# Patient Record
Sex: FEMALE | Race: BLACK OR AFRICAN AMERICAN | NOT HISPANIC OR LATINO | Employment: OTHER | URBAN - METROPOLITAN AREA
[De-identification: names, ages, dates, MRNs, and addresses within clinical notes are randomized per-mention and may not be internally consistent; named-entity substitution may affect disease eponyms.]

---

## 2017-01-03 ENCOUNTER — AMBULATORY - HEALTHEAST (OUTPATIENT)
Dept: LAB | Facility: CLINIC | Age: 52
End: 2017-01-03

## 2017-01-03 ENCOUNTER — COMMUNICATION - HEALTHEAST (OUTPATIENT)
Dept: FAMILY MEDICINE | Facility: CLINIC | Age: 52
End: 2017-01-03

## 2017-01-03 ENCOUNTER — AMBULATORY - HEALTHEAST (OUTPATIENT)
Dept: NURSING | Facility: CLINIC | Age: 52
End: 2017-01-03

## 2017-01-03 DIAGNOSIS — I10 ESSENTIAL HYPERTENSION: ICD-10-CM

## 2017-01-03 DIAGNOSIS — F10.20 ALCOHOLISM (H): ICD-10-CM

## 2017-01-03 DIAGNOSIS — M17.9 OA (OSTEOARTHRITIS) OF KNEE: ICD-10-CM

## 2017-01-04 ENCOUNTER — COMMUNICATION - HEALTHEAST (OUTPATIENT)
Dept: FAMILY MEDICINE | Facility: CLINIC | Age: 52
End: 2017-01-04

## 2017-01-04 DIAGNOSIS — J45.30 MILD PERSISTENT ASTHMA WITHOUT COMPLICATION: ICD-10-CM

## 2017-01-13 ENCOUNTER — COMMUNICATION - HEALTHEAST (OUTPATIENT)
Dept: FAMILY MEDICINE | Facility: CLINIC | Age: 52
End: 2017-01-13

## 2017-01-13 DIAGNOSIS — M25.819: ICD-10-CM

## 2017-01-23 ENCOUNTER — RECORDS - HEALTHEAST (OUTPATIENT)
Dept: ADMINISTRATIVE | Facility: OTHER | Age: 52
End: 2017-01-23

## 2017-01-26 ENCOUNTER — COMMUNICATION - HEALTHEAST (OUTPATIENT)
Dept: FAMILY MEDICINE | Facility: CLINIC | Age: 52
End: 2017-01-26

## 2017-01-26 DIAGNOSIS — M25.819: ICD-10-CM

## 2017-01-26 DIAGNOSIS — M17.9 OA (OSTEOARTHRITIS) OF KNEE: ICD-10-CM

## 2017-02-10 ENCOUNTER — RECORDS - HEALTHEAST (OUTPATIENT)
Dept: ADMINISTRATIVE | Facility: OTHER | Age: 52
End: 2017-02-10

## 2017-02-27 ENCOUNTER — COMMUNICATION - HEALTHEAST (OUTPATIENT)
Dept: FAMILY MEDICINE | Facility: CLINIC | Age: 52
End: 2017-02-27

## 2017-02-27 DIAGNOSIS — M17.9 OA (OSTEOARTHRITIS) OF KNEE: ICD-10-CM

## 2017-03-14 ENCOUNTER — RECORDS - HEALTHEAST (OUTPATIENT)
Dept: ADMINISTRATIVE | Facility: OTHER | Age: 52
End: 2017-03-14

## 2017-03-29 ENCOUNTER — COMMUNICATION - HEALTHEAST (OUTPATIENT)
Dept: FAMILY MEDICINE | Facility: CLINIC | Age: 52
End: 2017-03-29

## 2017-03-29 DIAGNOSIS — M17.9 OA (OSTEOARTHRITIS) OF KNEE: ICD-10-CM

## 2017-04-27 ENCOUNTER — COMMUNICATION - HEALTHEAST (OUTPATIENT)
Dept: FAMILY MEDICINE | Facility: CLINIC | Age: 52
End: 2017-04-27

## 2017-04-27 DIAGNOSIS — F41.1 GAD (GENERALIZED ANXIETY DISORDER): ICD-10-CM

## 2017-04-27 DIAGNOSIS — I10 UNSPECIFIED ESSENTIAL HYPERTENSION: ICD-10-CM

## 2017-04-27 DIAGNOSIS — J30.9 ALLERGIC RHINITIS: ICD-10-CM

## 2017-04-27 DIAGNOSIS — F32.9 MAJOR DEPRESSION: ICD-10-CM

## 2017-04-27 DIAGNOSIS — M17.9 OA (OSTEOARTHRITIS) OF KNEE: ICD-10-CM

## 2017-05-02 ENCOUNTER — COMMUNICATION - HEALTHEAST (OUTPATIENT)
Dept: FAMILY MEDICINE | Facility: CLINIC | Age: 52
End: 2017-05-02

## 2017-05-11 ENCOUNTER — RECORDS - HEALTHEAST (OUTPATIENT)
Dept: ADMINISTRATIVE | Facility: OTHER | Age: 52
End: 2017-05-11

## 2017-06-05 ENCOUNTER — COMMUNICATION - HEALTHEAST (OUTPATIENT)
Dept: FAMILY MEDICINE | Facility: CLINIC | Age: 52
End: 2017-06-05

## 2017-06-05 DIAGNOSIS — M25.819: ICD-10-CM

## 2017-06-08 ENCOUNTER — COMMUNICATION - HEALTHEAST (OUTPATIENT)
Dept: FAMILY MEDICINE | Facility: CLINIC | Age: 52
End: 2017-06-08

## 2017-06-08 DIAGNOSIS — M17.9 OA (OSTEOARTHRITIS) OF KNEE: ICD-10-CM

## 2017-06-18 ENCOUNTER — COMMUNICATION - HEALTHEAST (OUTPATIENT)
Dept: FAMILY MEDICINE | Facility: CLINIC | Age: 52
End: 2017-06-18

## 2017-06-18 DIAGNOSIS — J45.30 MILD PERSISTENT ASTHMA WITHOUT COMPLICATION: ICD-10-CM

## 2017-07-03 ENCOUNTER — COMMUNICATION - HEALTHEAST (OUTPATIENT)
Dept: FAMILY MEDICINE | Facility: CLINIC | Age: 52
End: 2017-07-03

## 2017-07-06 ENCOUNTER — OFFICE VISIT - HEALTHEAST (OUTPATIENT)
Dept: FAMILY MEDICINE | Facility: CLINIC | Age: 52
End: 2017-07-06

## 2017-07-06 DIAGNOSIS — M25.812 SHOULDER IMPINGEMENT, LEFT: ICD-10-CM

## 2017-07-06 DIAGNOSIS — Z01.818 PREOP EXAMINATION: ICD-10-CM

## 2017-07-06 DIAGNOSIS — M75.22 BICEPS TENDONITIS ON LEFT: ICD-10-CM

## 2017-07-06 DIAGNOSIS — J30.9 ALLERGIC RHINITIS: ICD-10-CM

## 2017-07-06 DIAGNOSIS — M25.819: ICD-10-CM

## 2017-07-06 LAB
ATRIAL RATE - MUSE: 85 BPM
DIASTOLIC BLOOD PRESSURE - MUSE: NORMAL MMHG
INTERPRETATION ECG - MUSE: NORMAL
P AXIS - MUSE: 39 DEGREES
PR INTERVAL - MUSE: 164 MS
QRS DURATION - MUSE: 82 MS
QT - MUSE: 400 MS
QTC - MUSE: 476 MS
R AXIS - MUSE: -17 DEGREES
SYSTOLIC BLOOD PRESSURE - MUSE: NORMAL MMHG
T AXIS - MUSE: 59 DEGREES
VENTRICULAR RATE- MUSE: 85 BPM

## 2017-07-06 ASSESSMENT — MIFFLIN-ST. JEOR: SCORE: 1898.47

## 2017-07-07 ENCOUNTER — COMMUNICATION - HEALTHEAST (OUTPATIENT)
Dept: FAMILY MEDICINE | Facility: CLINIC | Age: 52
End: 2017-07-07

## 2017-07-11 ENCOUNTER — OFFICE VISIT - HEALTHEAST (OUTPATIENT)
Dept: FAMILY MEDICINE | Facility: CLINIC | Age: 52
End: 2017-07-11

## 2017-07-11 DIAGNOSIS — R73.9 HYPERGLYCEMIA: ICD-10-CM

## 2017-07-11 DIAGNOSIS — E11.9 DIABETES TYPE 2, CONTROLLED (H): ICD-10-CM

## 2017-07-11 LAB — HBA1C MFR BLD: 6.5 % (ref 3.5–6)

## 2017-07-12 ENCOUNTER — RECORDS - HEALTHEAST (OUTPATIENT)
Dept: ADMINISTRATIVE | Facility: OTHER | Age: 52
End: 2017-07-12

## 2017-07-25 ENCOUNTER — COMMUNICATION - HEALTHEAST (OUTPATIENT)
Dept: FAMILY MEDICINE | Facility: CLINIC | Age: 52
End: 2017-07-25

## 2017-07-25 DIAGNOSIS — M25.819: ICD-10-CM

## 2017-07-25 DIAGNOSIS — M17.9 OA (OSTEOARTHRITIS) OF KNEE: ICD-10-CM

## 2017-07-28 ENCOUNTER — COMMUNICATION - HEALTHEAST (OUTPATIENT)
Dept: FAMILY MEDICINE | Facility: CLINIC | Age: 52
End: 2017-07-28

## 2017-07-28 DIAGNOSIS — M25.819: ICD-10-CM

## 2017-08-20 ENCOUNTER — COMMUNICATION - HEALTHEAST (OUTPATIENT)
Dept: FAMILY MEDICINE | Facility: CLINIC | Age: 52
End: 2017-08-20

## 2017-08-20 DIAGNOSIS — M25.819: ICD-10-CM

## 2017-08-22 ENCOUNTER — COMMUNICATION - HEALTHEAST (OUTPATIENT)
Dept: FAMILY MEDICINE | Facility: CLINIC | Age: 52
End: 2017-08-22

## 2017-08-23 ENCOUNTER — COMMUNICATION - HEALTHEAST (OUTPATIENT)
Dept: TELEHEALTH | Facility: CLINIC | Age: 52
End: 2017-08-23

## 2017-08-23 ENCOUNTER — OFFICE VISIT - HEALTHEAST (OUTPATIENT)
Dept: FAMILY MEDICINE | Facility: CLINIC | Age: 52
End: 2017-08-23

## 2017-08-23 ENCOUNTER — RECORDS - HEALTHEAST (OUTPATIENT)
Dept: ADMINISTRATIVE | Facility: OTHER | Age: 52
End: 2017-08-23

## 2017-08-23 DIAGNOSIS — Z01.818 PREOP EXAMINATION: ICD-10-CM

## 2017-08-23 ASSESSMENT — MIFFLIN-ST. JEOR: SCORE: 1880.33

## 2017-08-24 ENCOUNTER — ANESTHESIA - HEALTHEAST (OUTPATIENT)
Dept: SURGERY | Facility: CLINIC | Age: 52
End: 2017-08-24

## 2017-08-30 ENCOUNTER — COMMUNICATION - HEALTHEAST (OUTPATIENT)
Dept: FAMILY MEDICINE | Facility: CLINIC | Age: 52
End: 2017-08-30

## 2017-08-30 DIAGNOSIS — M17.9 OA (OSTEOARTHRITIS) OF KNEE: ICD-10-CM

## 2017-08-30 DIAGNOSIS — M94.212 GLENOID CHONDROMALACIA OF LEFT SHOULDER: ICD-10-CM

## 2017-08-30 DIAGNOSIS — M75.42 SHOULDER IMPINGEMENT SYNDROME, LEFT: ICD-10-CM

## 2017-08-30 DIAGNOSIS — M75.22 BICEPS TENDINITIS OF LEFT UPPER EXTREMITY: ICD-10-CM

## 2017-09-06 ENCOUNTER — COMMUNICATION - HEALTHEAST (OUTPATIENT)
Dept: FAMILY MEDICINE | Facility: CLINIC | Age: 52
End: 2017-09-06

## 2017-09-06 DIAGNOSIS — M94.212 GLENOID CHONDROMALACIA OF LEFT SHOULDER: ICD-10-CM

## 2017-09-06 DIAGNOSIS — M25.819: ICD-10-CM

## 2017-09-06 DIAGNOSIS — M75.22 BICEPS TENDINITIS OF LEFT UPPER EXTREMITY: ICD-10-CM

## 2017-09-06 DIAGNOSIS — M75.42 SHOULDER IMPINGEMENT SYNDROME, LEFT: ICD-10-CM

## 2017-09-12 ENCOUNTER — RECORDS - HEALTHEAST (OUTPATIENT)
Dept: ADMINISTRATIVE | Facility: OTHER | Age: 52
End: 2017-09-12

## 2017-10-11 ENCOUNTER — COMMUNICATION - HEALTHEAST (OUTPATIENT)
Dept: FAMILY MEDICINE | Facility: CLINIC | Age: 52
End: 2017-10-11

## 2017-10-11 DIAGNOSIS — J30.9 ALLERGIC RHINITIS: ICD-10-CM

## 2017-10-11 DIAGNOSIS — I10 ESSENTIAL HYPERTENSION: ICD-10-CM

## 2017-10-11 DIAGNOSIS — F41.1 GAD (GENERALIZED ANXIETY DISORDER): ICD-10-CM

## 2017-10-31 ENCOUNTER — COMMUNICATION - HEALTHEAST (OUTPATIENT)
Dept: FAMILY MEDICINE | Facility: CLINIC | Age: 52
End: 2017-10-31

## 2017-10-31 DIAGNOSIS — M17.9 OA (OSTEOARTHRITIS) OF KNEE: ICD-10-CM

## 2017-11-02 ENCOUNTER — RECORDS - HEALTHEAST (OUTPATIENT)
Dept: ADMINISTRATIVE | Facility: OTHER | Age: 52
End: 2017-11-02

## 2017-11-20 ENCOUNTER — COMMUNICATION - HEALTHEAST (OUTPATIENT)
Dept: FAMILY MEDICINE | Facility: CLINIC | Age: 52
End: 2017-11-20

## 2017-11-30 ENCOUNTER — COMMUNICATION - HEALTHEAST (OUTPATIENT)
Dept: FAMILY MEDICINE | Facility: CLINIC | Age: 52
End: 2017-11-30

## 2017-11-30 DIAGNOSIS — M94.212 GLENOID CHONDROMALACIA OF LEFT SHOULDER: ICD-10-CM

## 2017-11-30 DIAGNOSIS — M75.42 SHOULDER IMPINGEMENT SYNDROME, LEFT: ICD-10-CM

## 2017-11-30 DIAGNOSIS — M75.22 BICEPS TENDINITIS OF LEFT UPPER EXTREMITY: ICD-10-CM

## 2017-11-30 DIAGNOSIS — M17.9 OA (OSTEOARTHRITIS) OF KNEE: ICD-10-CM

## 2018-01-05 ENCOUNTER — COMMUNICATION - HEALTHEAST (OUTPATIENT)
Dept: FAMILY MEDICINE | Facility: CLINIC | Age: 53
End: 2018-01-05

## 2018-01-05 DIAGNOSIS — F32.9 MAJOR DEPRESSION: ICD-10-CM

## 2018-01-05 DIAGNOSIS — M17.9 OA (OSTEOARTHRITIS) OF KNEE: ICD-10-CM

## 2018-03-18 ENCOUNTER — COMMUNICATION - HEALTHEAST (OUTPATIENT)
Dept: FAMILY MEDICINE | Facility: CLINIC | Age: 53
End: 2018-03-18

## 2018-03-18 DIAGNOSIS — M17.9 OA (OSTEOARTHRITIS) OF KNEE: ICD-10-CM

## 2018-03-20 ENCOUNTER — RECORDS - HEALTHEAST (OUTPATIENT)
Dept: ADMINISTRATIVE | Facility: OTHER | Age: 53
End: 2018-03-20

## 2018-04-18 ENCOUNTER — COMMUNICATION - HEALTHEAST (OUTPATIENT)
Dept: FAMILY MEDICINE | Facility: CLINIC | Age: 53
End: 2018-04-18

## 2018-04-18 DIAGNOSIS — M17.9 OA (OSTEOARTHRITIS) OF KNEE: ICD-10-CM

## 2018-05-15 ENCOUNTER — COMMUNICATION - HEALTHEAST (OUTPATIENT)
Dept: FAMILY MEDICINE | Facility: CLINIC | Age: 53
End: 2018-05-15

## 2018-05-15 DIAGNOSIS — M17.9 OA (OSTEOARTHRITIS) OF KNEE: ICD-10-CM

## 2018-06-07 ENCOUNTER — COMMUNICATION - HEALTHEAST (OUTPATIENT)
Dept: FAMILY MEDICINE | Facility: CLINIC | Age: 53
End: 2018-06-07

## 2018-06-21 ENCOUNTER — COMMUNICATION - HEALTHEAST (OUTPATIENT)
Dept: FAMILY MEDICINE | Facility: CLINIC | Age: 53
End: 2018-06-21

## 2018-06-21 DIAGNOSIS — M17.9 OA (OSTEOARTHRITIS) OF KNEE: ICD-10-CM

## 2018-06-21 DIAGNOSIS — J45.30 MILD PERSISTENT ASTHMA WITHOUT COMPLICATION: ICD-10-CM

## 2018-06-22 ENCOUNTER — COMMUNICATION - HEALTHEAST (OUTPATIENT)
Dept: FAMILY MEDICINE | Facility: CLINIC | Age: 53
End: 2018-06-22

## 2018-06-22 DIAGNOSIS — F32.9 MAJOR DEPRESSION: ICD-10-CM

## 2018-07-19 ENCOUNTER — COMMUNICATION - HEALTHEAST (OUTPATIENT)
Dept: TELEHEALTH | Facility: CLINIC | Age: 53
End: 2018-07-19

## 2018-07-19 ENCOUNTER — OFFICE VISIT - HEALTHEAST (OUTPATIENT)
Dept: FAMILY MEDICINE | Facility: CLINIC | Age: 53
End: 2018-07-19

## 2018-07-19 DIAGNOSIS — F32.1 MODERATE SINGLE CURRENT EPISODE OF MAJOR DEPRESSIVE DISORDER (H): ICD-10-CM

## 2018-07-19 DIAGNOSIS — I10 ESSENTIAL HYPERTENSION: ICD-10-CM

## 2018-07-19 DIAGNOSIS — F33.2 SEVERE RECURRENT MAJOR DEPRESSION (H): ICD-10-CM

## 2018-07-19 DIAGNOSIS — Z12.31 VISIT FOR SCREENING MAMMOGRAM: ICD-10-CM

## 2018-07-19 DIAGNOSIS — J30.9 ALLERGIC RHINITIS: ICD-10-CM

## 2018-07-19 DIAGNOSIS — F41.1 GAD (GENERALIZED ANXIETY DISORDER): ICD-10-CM

## 2018-07-19 DIAGNOSIS — E78.00 PURE HYPERCHOLESTEROLEMIA: ICD-10-CM

## 2018-07-19 DIAGNOSIS — J45.30 MILD PERSISTENT ASTHMA WITHOUT COMPLICATION: ICD-10-CM

## 2018-07-19 DIAGNOSIS — Z12.31 ENCOUNTER FOR SCREENING MAMMOGRAM FOR MALIGNANT NEOPLASM OF BREAST: ICD-10-CM

## 2018-07-19 DIAGNOSIS — Z12.11 SCREEN FOR COLON CANCER: ICD-10-CM

## 2018-07-19 DIAGNOSIS — E11.9 TYPE 2 DIABETES MELLITUS WITHOUT COMPLICATION, WITHOUT LONG-TERM CURRENT USE OF INSULIN (H): ICD-10-CM

## 2018-07-19 LAB
ANION GAP SERPL CALCULATED.3IONS-SCNC: 10 MMOL/L (ref 5–18)
BUN SERPL-MCNC: 11 MG/DL (ref 8–22)
CALCIUM SERPL-MCNC: 9.5 MG/DL (ref 8.5–10.5)
CHLORIDE BLD-SCNC: 101 MMOL/L (ref 98–107)
CHOLEST SERPL-MCNC: 216 MG/DL
CO2 SERPL-SCNC: 29 MMOL/L (ref 22–31)
CREAT SERPL-MCNC: 0.79 MG/DL (ref 0.6–1.1)
FASTING STATUS PATIENT QL REPORTED: ABNORMAL
GFR SERPL CREATININE-BSD FRML MDRD: >60 ML/MIN/1.73M2
GLUCOSE BLD-MCNC: 97 MG/DL (ref 70–125)
HBA1C MFR BLD: 5.9 % (ref 3.5–6)
HDLC SERPL-MCNC: 45 MG/DL
LDLC SERPL CALC-MCNC: 145 MG/DL
POTASSIUM BLD-SCNC: 4 MMOL/L (ref 3.5–5)
SODIUM SERPL-SCNC: 140 MMOL/L (ref 136–145)
TRIGL SERPL-MCNC: 130 MG/DL

## 2018-07-19 ASSESSMENT — MIFFLIN-ST. JEOR: SCORE: 1780.54

## 2018-07-24 ENCOUNTER — COMMUNICATION - HEALTHEAST (OUTPATIENT)
Dept: FAMILY MEDICINE | Facility: CLINIC | Age: 53
End: 2018-07-24

## 2018-07-24 DIAGNOSIS — J45.30 MILD PERSISTENT ASTHMA WITHOUT COMPLICATION: ICD-10-CM

## 2018-07-25 ENCOUNTER — RECORDS - HEALTHEAST (OUTPATIENT)
Dept: ADMINISTRATIVE | Facility: OTHER | Age: 53
End: 2018-07-25

## 2018-07-26 ENCOUNTER — AMBULATORY - HEALTHEAST (OUTPATIENT)
Dept: FAMILY MEDICINE | Facility: CLINIC | Age: 53
End: 2018-07-26

## 2018-08-13 ENCOUNTER — COMMUNICATION - HEALTHEAST (OUTPATIENT)
Dept: FAMILY MEDICINE | Facility: CLINIC | Age: 53
End: 2018-08-13

## 2018-08-13 DIAGNOSIS — M17.9 OA (OSTEOARTHRITIS) OF KNEE: ICD-10-CM

## 2018-08-17 ENCOUNTER — COMMUNICATION - HEALTHEAST (OUTPATIENT)
Dept: FAMILY MEDICINE | Facility: CLINIC | Age: 53
End: 2018-08-17

## 2018-08-23 ENCOUNTER — COMMUNICATION - HEALTHEAST (OUTPATIENT)
Dept: FAMILY MEDICINE | Facility: CLINIC | Age: 53
End: 2018-08-23

## 2018-08-24 ENCOUNTER — COMMUNICATION - HEALTHEAST (OUTPATIENT)
Dept: FAMILY MEDICINE | Facility: CLINIC | Age: 53
End: 2018-08-24

## 2018-08-24 DIAGNOSIS — M17.9 OA (OSTEOARTHRITIS) OF KNEE: ICD-10-CM

## 2018-09-06 ENCOUNTER — COMMUNICATION - HEALTHEAST (OUTPATIENT)
Dept: FAMILY MEDICINE | Facility: CLINIC | Age: 53
End: 2018-09-06

## 2018-09-06 DIAGNOSIS — J30.9 ALLERGIC RHINITIS: ICD-10-CM

## 2018-09-06 DIAGNOSIS — F41.1 GAD (GENERALIZED ANXIETY DISORDER): ICD-10-CM

## 2018-09-18 ENCOUNTER — RECORDS - HEALTHEAST (OUTPATIENT)
Dept: ADMINISTRATIVE | Facility: OTHER | Age: 53
End: 2018-09-18

## 2018-09-19 ENCOUNTER — COMMUNICATION - HEALTHEAST (OUTPATIENT)
Dept: FAMILY MEDICINE | Facility: CLINIC | Age: 53
End: 2018-09-19

## 2018-09-19 DIAGNOSIS — M17.9 OA (OSTEOARTHRITIS) OF KNEE: ICD-10-CM

## 2018-09-20 ENCOUNTER — AMBULATORY - HEALTHEAST (OUTPATIENT)
Dept: BEHAVIORAL HEALTH | Facility: CLINIC | Age: 53
End: 2018-09-20

## 2018-10-11 ENCOUNTER — RECORDS - HEALTHEAST (OUTPATIENT)
Dept: ADMINISTRATIVE | Facility: OTHER | Age: 53
End: 2018-10-11

## 2018-10-26 ENCOUNTER — COMMUNICATION - HEALTHEAST (OUTPATIENT)
Dept: FAMILY MEDICINE | Facility: CLINIC | Age: 53
End: 2018-10-26

## 2018-10-26 DIAGNOSIS — M17.9 OA (OSTEOARTHRITIS) OF KNEE: ICD-10-CM

## 2018-11-01 ENCOUNTER — COMMUNICATION - HEALTHEAST (OUTPATIENT)
Dept: FAMILY MEDICINE | Facility: CLINIC | Age: 53
End: 2018-11-01

## 2018-11-01 DIAGNOSIS — M17.9 OA (OSTEOARTHRITIS) OF KNEE: ICD-10-CM

## 2018-11-27 ENCOUNTER — COMMUNICATION - HEALTHEAST (OUTPATIENT)
Dept: FAMILY MEDICINE | Facility: CLINIC | Age: 53
End: 2018-11-27

## 2018-11-27 DIAGNOSIS — M17.9 OA (OSTEOARTHRITIS) OF KNEE: ICD-10-CM

## 2019-04-16 ENCOUNTER — COMMUNICATION - HEALTHEAST (OUTPATIENT)
Dept: TELEHEALTH | Facility: CLINIC | Age: 54
End: 2019-04-16

## 2019-04-16 ENCOUNTER — RECORDS - HEALTHEAST (OUTPATIENT)
Dept: MAMMOGRAPHY | Facility: CLINIC | Age: 54
End: 2019-04-16

## 2019-04-16 DIAGNOSIS — Z12.31 ENCOUNTER FOR SCREENING MAMMOGRAM FOR MALIGNANT NEOPLASM OF BREAST: ICD-10-CM

## 2019-05-28 ENCOUNTER — OFFICE VISIT - HEALTHEAST (OUTPATIENT)
Dept: FAMILY MEDICINE | Facility: CLINIC | Age: 54
End: 2019-05-28

## 2019-05-28 ENCOUNTER — COMMUNICATION - HEALTHEAST (OUTPATIENT)
Dept: TELEHEALTH | Facility: CLINIC | Age: 54
End: 2019-05-28

## 2019-05-28 DIAGNOSIS — J30.9 ALLERGIC RHINITIS: ICD-10-CM

## 2019-05-28 DIAGNOSIS — F11.90 CHRONIC, CONTINUOUS USE OF OPIOIDS: ICD-10-CM

## 2019-05-28 DIAGNOSIS — Z12.11 SCREEN FOR COLON CANCER: ICD-10-CM

## 2019-05-28 DIAGNOSIS — E11.9 TYPE 2 DIABETES MELLITUS WITHOUT COMPLICATION, WITHOUT LONG-TERM CURRENT USE OF INSULIN (H): ICD-10-CM

## 2019-05-28 DIAGNOSIS — F32.1 MODERATE SINGLE CURRENT EPISODE OF MAJOR DEPRESSIVE DISORDER (H): ICD-10-CM

## 2019-05-28 DIAGNOSIS — I10 ESSENTIAL HYPERTENSION: ICD-10-CM

## 2019-05-28 DIAGNOSIS — Z79.891 LONG TERM CURRENT USE OF OPIATE ANALGESIC: ICD-10-CM

## 2019-05-28 DIAGNOSIS — L30.9 ECZEMA, UNSPECIFIED TYPE: ICD-10-CM

## 2019-05-28 DIAGNOSIS — M17.9 OA (OSTEOARTHRITIS) OF KNEE: ICD-10-CM

## 2019-05-28 DIAGNOSIS — E66.01 MORBID OBESITY (H): ICD-10-CM

## 2019-05-28 DIAGNOSIS — F41.1 GAD (GENERALIZED ANXIETY DISORDER): ICD-10-CM

## 2019-05-28 DIAGNOSIS — J45.30 MILD PERSISTENT ASTHMA WITHOUT COMPLICATION: ICD-10-CM

## 2019-05-28 LAB
ANION GAP SERPL CALCULATED.3IONS-SCNC: 13 MMOL/L (ref 5–18)
BUN SERPL-MCNC: 13 MG/DL (ref 8–22)
CALCIUM SERPL-MCNC: 9.8 MG/DL (ref 8.5–10.5)
CHLORIDE BLD-SCNC: 101 MMOL/L (ref 98–107)
CO2 SERPL-SCNC: 26 MMOL/L (ref 22–31)
CREAT SERPL-MCNC: 0.77 MG/DL (ref 0.6–1.1)
CREAT UR-MCNC: 63.5 MG/DL
GFR SERPL CREATININE-BSD FRML MDRD: >60 ML/MIN/1.73M2
GLUCOSE BLD-MCNC: 106 MG/DL (ref 70–125)
HBA1C MFR BLD: 6 % (ref 3.5–6)
MICROALBUMIN UR-MCNC: <0.5 MG/DL (ref 0–1.99)
MICROALBUMIN/CREAT UR: NORMAL MG/G
POTASSIUM BLD-SCNC: 4 MMOL/L (ref 3.5–5)
SODIUM SERPL-SCNC: 140 MMOL/L (ref 136–145)

## 2019-06-02 LAB
AMPHETAMINES UR QL SCN: NORMAL
BARBITURATES UR QL: NORMAL
BENZODIAZ UR QL: NORMAL
CANNABINOIDS UR QL SCN: NORMAL
COCAINE UR QL: NORMAL
CREAT UR-MCNC: 66.2 MG/DL
METHADONE UR QL SCN: NORMAL
OPIATES UR QL SCN: NORMAL
OXYCODONE UR QL: NORMAL
PCP UR QL SCN: NORMAL

## 2019-06-11 ENCOUNTER — COMMUNICATION - HEALTHEAST (OUTPATIENT)
Dept: FAMILY MEDICINE | Facility: CLINIC | Age: 54
End: 2019-06-11

## 2019-06-12 ENCOUNTER — COMMUNICATION - HEALTHEAST (OUTPATIENT)
Dept: FAMILY MEDICINE | Facility: CLINIC | Age: 54
End: 2019-06-12

## 2019-06-12 DIAGNOSIS — M17.0 PRIMARY OSTEOARTHRITIS OF BOTH KNEES: ICD-10-CM

## 2019-06-12 DIAGNOSIS — E66.01 MORBID OBESITY (H): ICD-10-CM

## 2019-07-15 ENCOUNTER — COMMUNICATION - HEALTHEAST (OUTPATIENT)
Dept: FAMILY MEDICINE | Facility: CLINIC | Age: 54
End: 2019-07-15

## 2019-07-16 ENCOUNTER — COMMUNICATION - HEALTHEAST (OUTPATIENT)
Dept: FAMILY MEDICINE | Facility: CLINIC | Age: 54
End: 2019-07-16

## 2019-07-16 DIAGNOSIS — M17.9 OA (OSTEOARTHRITIS) OF KNEE: ICD-10-CM

## 2019-07-18 ENCOUNTER — COMMUNICATION - HEALTHEAST (OUTPATIENT)
Dept: SCHEDULING | Facility: CLINIC | Age: 54
End: 2019-07-18

## 2019-07-18 DIAGNOSIS — F32.1 MODERATE SINGLE CURRENT EPISODE OF MAJOR DEPRESSIVE DISORDER (H): ICD-10-CM

## 2019-07-19 ENCOUNTER — COMMUNICATION - HEALTHEAST (OUTPATIENT)
Dept: FAMILY MEDICINE | Facility: CLINIC | Age: 54
End: 2019-07-19

## 2019-07-19 DIAGNOSIS — M17.9 OA (OSTEOARTHRITIS) OF KNEE: ICD-10-CM

## 2019-07-22 ENCOUNTER — AMBULATORY - HEALTHEAST (OUTPATIENT)
Dept: FAMILY MEDICINE | Facility: CLINIC | Age: 54
End: 2019-07-22

## 2019-07-22 DIAGNOSIS — M17.9 OA (OSTEOARTHRITIS) OF KNEE: ICD-10-CM

## 2019-08-23 ENCOUNTER — COMMUNICATION - HEALTHEAST (OUTPATIENT)
Dept: FAMILY MEDICINE | Facility: CLINIC | Age: 54
End: 2019-08-23

## 2019-08-23 DIAGNOSIS — M17.9 OA (OSTEOARTHRITIS) OF KNEE: ICD-10-CM

## 2019-08-23 DIAGNOSIS — I10 ESSENTIAL HYPERTENSION: ICD-10-CM

## 2019-09-24 ENCOUNTER — COMMUNICATION - HEALTHEAST (OUTPATIENT)
Dept: FAMILY MEDICINE | Facility: CLINIC | Age: 54
End: 2019-09-24

## 2019-09-24 DIAGNOSIS — M17.9 OA (OSTEOARTHRITIS) OF KNEE: ICD-10-CM

## 2019-10-29 ENCOUNTER — COMMUNICATION - HEALTHEAST (OUTPATIENT)
Dept: FAMILY MEDICINE | Facility: CLINIC | Age: 54
End: 2019-10-29

## 2019-10-29 DIAGNOSIS — M17.9 OA (OSTEOARTHRITIS) OF KNEE: ICD-10-CM

## 2019-11-12 ENCOUNTER — OFFICE VISIT - HEALTHEAST (OUTPATIENT)
Dept: FAMILY MEDICINE | Facility: CLINIC | Age: 54
End: 2019-11-12

## 2019-11-12 DIAGNOSIS — Z23 NEED FOR INFLUENZA VACCINATION: ICD-10-CM

## 2019-11-12 DIAGNOSIS — Z11.59 NEED FOR HEPATITIS C SCREENING TEST: ICD-10-CM

## 2019-11-12 DIAGNOSIS — M19.011 OSTEOARTHRITIS OF RIGHT SHOULDER, UNSPECIFIED OSTEOARTHRITIS TYPE: ICD-10-CM

## 2019-11-12 DIAGNOSIS — E11.9 TYPE 2 DIABETES MELLITUS WITHOUT COMPLICATION, WITHOUT LONG-TERM CURRENT USE OF INSULIN (H): ICD-10-CM

## 2019-11-12 LAB
ALBUMIN SERPL-MCNC: 3.6 G/DL (ref 3.5–5)
ALP SERPL-CCNC: 110 U/L (ref 45–120)
ALT SERPL W P-5'-P-CCNC: 17 U/L (ref 0–45)
ANION GAP SERPL CALCULATED.3IONS-SCNC: 12 MMOL/L (ref 5–18)
AST SERPL W P-5'-P-CCNC: 23 U/L (ref 0–40)
BILIRUB SERPL-MCNC: 0.5 MG/DL (ref 0–1)
BUN SERPL-MCNC: 15 MG/DL (ref 8–22)
CALCIUM SERPL-MCNC: 9.4 MG/DL (ref 8.5–10.5)
CHLORIDE BLD-SCNC: 100 MMOL/L (ref 98–107)
CO2 SERPL-SCNC: 28 MMOL/L (ref 22–31)
CREAT SERPL-MCNC: 0.78 MG/DL (ref 0.6–1.1)
GFR SERPL CREATININE-BSD FRML MDRD: >60 ML/MIN/1.73M2
GLUCOSE BLD-MCNC: 124 MG/DL (ref 70–125)
HBA1C MFR BLD: 6.7 % (ref 3.5–6)
LDLC SERPL CALC-MCNC: 72 MG/DL
POTASSIUM BLD-SCNC: 4.2 MMOL/L (ref 3.5–5)
PROT SERPL-MCNC: 6.9 G/DL (ref 6–8)
SODIUM SERPL-SCNC: 140 MMOL/L (ref 136–145)

## 2019-11-12 ASSESSMENT — ANXIETY QUESTIONNAIRES
2. NOT BEING ABLE TO STOP OR CONTROL WORRYING: NOT AT ALL
1. FEELING NERVOUS, ANXIOUS, OR ON EDGE: NEARLY EVERY DAY

## 2019-11-12 ASSESSMENT — PATIENT HEALTH QUESTIONNAIRE - PHQ9: SUM OF ALL RESPONSES TO PHQ QUESTIONS 1-9: 22

## 2019-11-12 ASSESSMENT — MIFFLIN-ST. JEOR: SCORE: 1821.36

## 2019-11-13 LAB — HCV AB SERPL QL IA: NEGATIVE

## 2019-11-14 ENCOUNTER — COMMUNICATION - HEALTHEAST (OUTPATIENT)
Dept: FAMILY MEDICINE | Facility: CLINIC | Age: 54
End: 2019-11-14

## 2019-11-15 ENCOUNTER — COMMUNICATION - HEALTHEAST (OUTPATIENT)
Dept: FAMILY MEDICINE | Facility: CLINIC | Age: 54
End: 2019-11-15

## 2019-11-25 ENCOUNTER — COMMUNICATION - HEALTHEAST (OUTPATIENT)
Dept: FAMILY MEDICINE | Facility: CLINIC | Age: 54
End: 2019-11-25

## 2019-11-25 DIAGNOSIS — M17.9 OA (OSTEOARTHRITIS) OF KNEE: ICD-10-CM

## 2019-11-25 DIAGNOSIS — J30.9 ALLERGIC RHINITIS: ICD-10-CM

## 2019-12-30 ENCOUNTER — COMMUNICATION - HEALTHEAST (OUTPATIENT)
Dept: FAMILY MEDICINE | Facility: CLINIC | Age: 54
End: 2019-12-30

## 2019-12-30 DIAGNOSIS — F41.1 GAD (GENERALIZED ANXIETY DISORDER): ICD-10-CM

## 2019-12-30 DIAGNOSIS — J30.9 ALLERGIC RHINITIS: ICD-10-CM

## 2019-12-30 DIAGNOSIS — F32.1 MODERATE SINGLE CURRENT EPISODE OF MAJOR DEPRESSIVE DISORDER (H): ICD-10-CM

## 2020-01-03 ENCOUNTER — COMMUNICATION - HEALTHEAST (OUTPATIENT)
Dept: FAMILY MEDICINE | Facility: CLINIC | Age: 55
End: 2020-01-03

## 2020-01-03 DIAGNOSIS — M17.9 OA (OSTEOARTHRITIS) OF KNEE: ICD-10-CM

## 2020-01-06 ENCOUNTER — COMMUNICATION - HEALTHEAST (OUTPATIENT)
Dept: FAMILY MEDICINE | Facility: CLINIC | Age: 55
End: 2020-01-06

## 2020-01-06 DIAGNOSIS — I10 ESSENTIAL HYPERTENSION: ICD-10-CM

## 2020-01-08 RX ORDER — HYDROCHLOROTHIAZIDE 25 MG/1
25 TABLET ORAL DAILY
Qty: 90 TABLET | Refills: 3 | Status: SHIPPED | OUTPATIENT
Start: 2020-01-08

## 2020-01-11 ENCOUNTER — COMMUNICATION - HEALTHEAST (OUTPATIENT)
Dept: FAMILY MEDICINE | Facility: CLINIC | Age: 55
End: 2020-01-11

## 2020-01-11 DIAGNOSIS — L30.9 ECZEMA, UNSPECIFIED TYPE: ICD-10-CM

## 2020-01-15 ENCOUNTER — COMMUNICATION - HEALTHEAST (OUTPATIENT)
Dept: FAMILY MEDICINE | Facility: CLINIC | Age: 55
End: 2020-01-15

## 2020-01-23 ENCOUNTER — COMMUNICATION - HEALTHEAST (OUTPATIENT)
Dept: FAMILY MEDICINE | Facility: CLINIC | Age: 55
End: 2020-01-23

## 2020-01-28 ENCOUNTER — COMMUNICATION - HEALTHEAST (OUTPATIENT)
Dept: FAMILY MEDICINE | Facility: CLINIC | Age: 55
End: 2020-01-28

## 2020-01-28 DIAGNOSIS — M17.9 OA (OSTEOARTHRITIS) OF KNEE: ICD-10-CM

## 2020-02-28 ENCOUNTER — COMMUNICATION - HEALTHEAST (OUTPATIENT)
Dept: SCHEDULING | Facility: CLINIC | Age: 55
End: 2020-02-28

## 2020-02-28 DIAGNOSIS — M17.9 OA (OSTEOARTHRITIS) OF KNEE: ICD-10-CM

## 2020-03-26 ENCOUNTER — COMMUNICATION - HEALTHEAST (OUTPATIENT)
Dept: FAMILY MEDICINE | Facility: CLINIC | Age: 55
End: 2020-03-26

## 2020-03-26 DIAGNOSIS — F32.1 MODERATE SINGLE CURRENT EPISODE OF MAJOR DEPRESSIVE DISORDER (H): ICD-10-CM

## 2020-04-01 ENCOUNTER — COMMUNICATION - HEALTHEAST (OUTPATIENT)
Dept: FAMILY MEDICINE | Facility: CLINIC | Age: 55
End: 2020-04-01

## 2020-04-01 DIAGNOSIS — M17.9 OA (OSTEOARTHRITIS) OF KNEE: ICD-10-CM

## 2020-04-01 DIAGNOSIS — L30.9 ECZEMA, UNSPECIFIED TYPE: ICD-10-CM

## 2020-04-28 ENCOUNTER — COMMUNICATION - HEALTHEAST (OUTPATIENT)
Dept: FAMILY MEDICINE | Facility: CLINIC | Age: 55
End: 2020-04-28

## 2020-04-28 DIAGNOSIS — M17.9 OA (OSTEOARTHRITIS) OF KNEE: ICD-10-CM

## 2020-04-30 ENCOUNTER — COMMUNICATION - HEALTHEAST (OUTPATIENT)
Dept: SCHEDULING | Facility: CLINIC | Age: 55
End: 2020-04-30

## 2020-05-01 ENCOUNTER — OFFICE VISIT - HEALTHEAST (OUTPATIENT)
Dept: FAMILY MEDICINE | Facility: CLINIC | Age: 55
End: 2020-05-01

## 2020-05-01 DIAGNOSIS — J01.00 ACUTE NON-RECURRENT MAXILLARY SINUSITIS: ICD-10-CM

## 2020-05-01 DIAGNOSIS — J30.9 ALLERGIC RHINITIS: ICD-10-CM

## 2020-05-01 RX ORDER — CETIRIZINE HYDROCHLORIDE 10 MG/1
10 TABLET ORAL DAILY
Qty: 90 TABLET | Refills: 1 | Status: SHIPPED | OUTPATIENT
Start: 2020-05-01

## 2020-05-01 ASSESSMENT — ANXIETY QUESTIONNAIRES
4. TROUBLE RELAXING: NEARLY EVERY DAY
IF YOU CHECKED OFF ANY PROBLEMS ON THIS QUESTIONNAIRE, HOW DIFFICULT HAVE THESE PROBLEMS MADE IT FOR YOU TO DO YOUR WORK, TAKE CARE OF THINGS AT HOME, OR GET ALONG WITH OTHER PEOPLE: SOMEWHAT DIFFICULT
2. NOT BEING ABLE TO STOP OR CONTROL WORRYING: NEARLY EVERY DAY
5. BEING SO RESTLESS THAT IT IS HARD TO SIT STILL: MORE THAN HALF THE DAYS
3. WORRYING TOO MUCH ABOUT DIFFERENT THINGS: NEARLY EVERY DAY
6. BECOMING EASILY ANNOYED OR IRRITABLE: NEARLY EVERY DAY
7. FEELING AFRAID AS IF SOMETHING AWFUL MIGHT HAPPEN: NEARLY EVERY DAY
1. FEELING NERVOUS, ANXIOUS, OR ON EDGE: NEARLY EVERY DAY
GAD7 TOTAL SCORE: 20

## 2020-05-01 ASSESSMENT — PATIENT HEALTH QUESTIONNAIRE - PHQ9: SUM OF ALL RESPONSES TO PHQ QUESTIONS 1-9: 24

## 2020-05-05 ENCOUNTER — COMMUNICATION - HEALTHEAST (OUTPATIENT)
Dept: FAMILY MEDICINE | Facility: CLINIC | Age: 55
End: 2020-05-05

## 2020-05-05 ENCOUNTER — RECORDS - HEALTHEAST (OUTPATIENT)
Dept: ADMINISTRATIVE | Facility: OTHER | Age: 55
End: 2020-05-05

## 2020-05-28 ENCOUNTER — COMMUNICATION - HEALTHEAST (OUTPATIENT)
Dept: FAMILY MEDICINE | Facility: CLINIC | Age: 55
End: 2020-05-28

## 2020-05-28 DIAGNOSIS — E11.9 TYPE 2 DIABETES MELLITUS WITHOUT COMPLICATION, WITHOUT LONG-TERM CURRENT USE OF INSULIN (H): ICD-10-CM

## 2020-05-28 DIAGNOSIS — I10 ESSENTIAL HYPERTENSION: ICD-10-CM

## 2020-05-28 DIAGNOSIS — M17.9 OA (OSTEOARTHRITIS) OF KNEE: ICD-10-CM

## 2020-05-28 DIAGNOSIS — J45.30 MILD PERSISTENT ASTHMA WITHOUT COMPLICATION: ICD-10-CM

## 2020-06-16 ENCOUNTER — COMMUNICATION - HEALTHEAST (OUTPATIENT)
Dept: FAMILY MEDICINE | Facility: CLINIC | Age: 55
End: 2020-06-16

## 2020-06-21 ENCOUNTER — COMMUNICATION - HEALTHEAST (OUTPATIENT)
Dept: FAMILY MEDICINE | Facility: CLINIC | Age: 55
End: 2020-06-21

## 2020-06-21 DIAGNOSIS — F32.1 MODERATE SINGLE CURRENT EPISODE OF MAJOR DEPRESSIVE DISORDER (H): ICD-10-CM

## 2020-06-22 ENCOUNTER — OFFICE VISIT - HEALTHEAST (OUTPATIENT)
Dept: FAMILY MEDICINE | Facility: CLINIC | Age: 55
End: 2020-06-22

## 2020-06-22 DIAGNOSIS — G47.33 OBSTRUCTIVE SLEEP APNEA (ADULT) (PEDIATRIC): ICD-10-CM

## 2020-06-22 DIAGNOSIS — E66.01 MORBID OBESITY (H): ICD-10-CM

## 2020-06-22 DIAGNOSIS — E78.00 PURE HYPERCHOLESTEROLEMIA: ICD-10-CM

## 2020-06-22 DIAGNOSIS — E11.9 TYPE 2 DIABETES MELLITUS WITHOUT COMPLICATION, WITHOUT LONG-TERM CURRENT USE OF INSULIN (H): ICD-10-CM

## 2020-06-22 DIAGNOSIS — F32.1 MODERATE SINGLE CURRENT EPISODE OF MAJOR DEPRESSIVE DISORDER (H): ICD-10-CM

## 2020-06-22 DIAGNOSIS — F41.1 GAD (GENERALIZED ANXIETY DISORDER): ICD-10-CM

## 2020-06-22 DIAGNOSIS — Z12.11 COLON CANCER SCREENING: ICD-10-CM

## 2020-06-22 DIAGNOSIS — I10 ESSENTIAL HYPERTENSION: ICD-10-CM

## 2020-06-22 DIAGNOSIS — J45.30 MILD PERSISTENT ASTHMA WITHOUT COMPLICATION: ICD-10-CM

## 2020-06-22 DIAGNOSIS — Z12.31 ENCOUNTER FOR SCREENING MAMMOGRAM FOR BREAST CANCER: ICD-10-CM

## 2020-06-22 LAB
ALBUMIN SERPL-MCNC: 3.5 G/DL (ref 3.5–5)
ALP SERPL-CCNC: 111 U/L (ref 45–120)
ALT SERPL W P-5'-P-CCNC: 16 U/L (ref 0–45)
ANION GAP SERPL CALCULATED.3IONS-SCNC: 12 MMOL/L (ref 5–18)
AST SERPL W P-5'-P-CCNC: 20 U/L (ref 0–40)
BILIRUB SERPL-MCNC: 0.6 MG/DL (ref 0–1)
BUN SERPL-MCNC: 9 MG/DL (ref 8–22)
CALCIUM SERPL-MCNC: 9.1 MG/DL (ref 8.5–10.5)
CHLORIDE BLD-SCNC: 103 MMOL/L (ref 98–107)
CHOLEST SERPL-MCNC: 126 MG/DL
CO2 SERPL-SCNC: 26 MMOL/L (ref 22–31)
CREAT SERPL-MCNC: 0.79 MG/DL (ref 0.6–1.1)
FASTING STATUS PATIENT QL REPORTED: NO
GFR SERPL CREATININE-BSD FRML MDRD: >60 ML/MIN/1.73M2
GLUCOSE BLD-MCNC: 101 MG/DL (ref 70–125)
HBA1C MFR BLD: 6.5 % (ref 3.5–6)
HDLC SERPL-MCNC: 33 MG/DL
LDLC SERPL CALC-MCNC: 62 MG/DL
POTASSIUM BLD-SCNC: 3.6 MMOL/L (ref 3.5–5)
PROT SERPL-MCNC: 6.9 G/DL (ref 6–8)
SODIUM SERPL-SCNC: 141 MMOL/L (ref 136–145)
TRIGL SERPL-MCNC: 153 MG/DL

## 2020-06-23 ENCOUNTER — COMMUNICATION - HEALTHEAST (OUTPATIENT)
Dept: SCHEDULING | Facility: CLINIC | Age: 55
End: 2020-06-23

## 2020-06-23 ENCOUNTER — COMMUNICATION - HEALTHEAST (OUTPATIENT)
Dept: FAMILY MEDICINE | Facility: CLINIC | Age: 55
End: 2020-06-23

## 2020-07-02 ENCOUNTER — COMMUNICATION - HEALTHEAST (OUTPATIENT)
Dept: FAMILY MEDICINE | Facility: CLINIC | Age: 55
End: 2020-07-02

## 2020-07-02 DIAGNOSIS — M17.9 OA (OSTEOARTHRITIS) OF KNEE: ICD-10-CM

## 2020-07-31 ENCOUNTER — COMMUNICATION - HEALTHEAST (OUTPATIENT)
Dept: SCHEDULING | Facility: CLINIC | Age: 55
End: 2020-07-31

## 2020-07-31 DIAGNOSIS — M17.9 OA (OSTEOARTHRITIS) OF KNEE: ICD-10-CM

## 2020-08-01 ENCOUNTER — COMMUNICATION - HEALTHEAST (OUTPATIENT)
Dept: FAMILY MEDICINE | Facility: CLINIC | Age: 55
End: 2020-08-01

## 2020-08-01 DIAGNOSIS — J30.9 ALLERGIC RHINITIS: ICD-10-CM

## 2020-08-01 DIAGNOSIS — F41.1 GAD (GENERALIZED ANXIETY DISORDER): ICD-10-CM

## 2020-08-02 RX ORDER — HYDROXYZINE HYDROCHLORIDE 25 MG/1
TABLET, FILM COATED ORAL
Qty: 90 TABLET | Refills: 2 | Status: SHIPPED | OUTPATIENT
Start: 2020-08-02 | End: 2022-01-24

## 2020-09-02 ENCOUNTER — COMMUNICATION - HEALTHEAST (OUTPATIENT)
Dept: FAMILY MEDICINE | Facility: CLINIC | Age: 55
End: 2020-09-02

## 2020-09-02 DIAGNOSIS — I10 ESSENTIAL HYPERTENSION: ICD-10-CM

## 2020-09-02 DIAGNOSIS — E11.9 TYPE 2 DIABETES MELLITUS WITHOUT COMPLICATION, WITHOUT LONG-TERM CURRENT USE OF INSULIN (H): ICD-10-CM

## 2020-09-02 DIAGNOSIS — M17.9 OA (OSTEOARTHRITIS) OF KNEE: ICD-10-CM

## 2020-09-02 DIAGNOSIS — J45.30 MILD PERSISTENT ASTHMA WITHOUT COMPLICATION: ICD-10-CM

## 2020-09-02 RX ORDER — BUDESONIDE AND FORMOTEROL FUMARATE DIHYDRATE 160; 4.5 UG/1; UG/1
AEROSOL RESPIRATORY (INHALATION)
Qty: 1 INHALER | Refills: 0 | Status: SHIPPED | OUTPATIENT
Start: 2020-09-02 | End: 2023-09-25

## 2020-09-02 RX ORDER — METOPROLOL SUCCINATE 100 MG/1
TABLET, EXTENDED RELEASE ORAL
Qty: 90 TABLET | Refills: 0 | Status: SHIPPED | OUTPATIENT
Start: 2020-09-02

## 2020-10-05 ENCOUNTER — COMMUNICATION - HEALTHEAST (OUTPATIENT)
Dept: FAMILY MEDICINE | Facility: CLINIC | Age: 55
End: 2020-10-05

## 2020-10-05 DIAGNOSIS — L30.9 ECZEMA, UNSPECIFIED TYPE: ICD-10-CM

## 2020-10-05 DIAGNOSIS — M17.9 OA (OSTEOARTHRITIS) OF KNEE: ICD-10-CM

## 2020-10-05 RX ORDER — TRIAMCINOLONE ACETONIDE 1 MG/G
CREAM TOPICAL
Qty: 80 G | Refills: 0 | Status: SHIPPED | OUTPATIENT
Start: 2020-10-05 | End: 2021-10-18

## 2020-10-12 ENCOUNTER — COMMUNICATION - HEALTHEAST (OUTPATIENT)
Dept: FAMILY MEDICINE | Facility: CLINIC | Age: 55
End: 2020-10-12

## 2020-11-05 ENCOUNTER — OFFICE VISIT - HEALTHEAST (OUTPATIENT)
Dept: FAMILY MEDICINE | Facility: CLINIC | Age: 55
End: 2020-11-05

## 2020-11-05 DIAGNOSIS — R80.9 TYPE 2 DIABETES MELLITUS WITH MICROALBUMINURIA, WITHOUT LONG-TERM CURRENT USE OF INSULIN (H): ICD-10-CM

## 2020-11-05 DIAGNOSIS — Z23 NEED FOR INFLUENZA VACCINATION: ICD-10-CM

## 2020-11-05 DIAGNOSIS — Z23 NEED FOR TD VACCINE: ICD-10-CM

## 2020-11-05 DIAGNOSIS — E11.29 TYPE 2 DIABETES MELLITUS WITH MICROALBUMINURIA, WITHOUT LONG-TERM CURRENT USE OF INSULIN (H): ICD-10-CM

## 2020-11-05 DIAGNOSIS — R06.09 DYSPNEA ON EXERTION: ICD-10-CM

## 2020-11-05 DIAGNOSIS — G89.4 CHRONIC PAIN SYNDROME: ICD-10-CM

## 2020-11-05 DIAGNOSIS — I10 ESSENTIAL HYPERTENSION: ICD-10-CM

## 2020-11-05 DIAGNOSIS — F11.90 CHRONIC, CONTINUOUS USE OF OPIOIDS: ICD-10-CM

## 2020-11-05 LAB
AMPHETAMINES UR QL SCN: NORMAL
BARBITURATES UR QL: NORMAL
BENZODIAZ UR QL: NORMAL
CANNABINOIDS UR QL SCN: NORMAL
COCAINE UR QL: NORMAL
CREAT UR-MCNC: 124.9 MG/DL
CREAT UR-MCNC: 124.9 MG/DL
ERYTHROCYTE [DISTWIDTH] IN BLOOD BY AUTOMATED COUNT: 11.3 % (ref 11–14.5)
HBA1C MFR BLD: 7.2 %
HCT VFR BLD AUTO: 41.3 % (ref 35–47)
HGB BLD-MCNC: 13.3 G/DL (ref 12–16)
MCH RBC QN AUTO: 31.4 PG (ref 27–34)
MCHC RBC AUTO-ENTMCNC: 32.2 G/DL (ref 32–36)
MCV RBC AUTO: 98 FL (ref 80–100)
METHADONE UR QL SCN: NORMAL
MICROALBUMIN UR-MCNC: 3.09 MG/DL (ref 0–1.99)
MICROALBUMIN/CREAT UR: 24.7 MG/G
OPIATES UR QL SCN: NORMAL
OXYCODONE UR QL: NORMAL
PCP UR QL SCN: NORMAL
PLATELET # BLD AUTO: 249 THOU/UL (ref 140–440)
PMV BLD AUTO: 7.9 FL (ref 7–10)
RBC # BLD AUTO: 4.24 MILL/UL (ref 3.8–5.4)
WBC: 8.9 THOU/UL (ref 4–11)

## 2020-11-05 RX ORDER — LOSARTAN POTASSIUM 100 MG/1
100 TABLET ORAL DAILY
Qty: 90 TABLET | Refills: 3 | Status: SHIPPED | OUTPATIENT
Start: 2020-11-05

## 2020-11-05 ASSESSMENT — PATIENT HEALTH QUESTIONNAIRE - PHQ9: SUM OF ALL RESPONSES TO PHQ QUESTIONS 1-9: 14

## 2020-11-05 ASSESSMENT — MIFFLIN-ST. JEOR: SCORE: 1788.47

## 2020-11-06 ENCOUNTER — RECORDS - HEALTHEAST (OUTPATIENT)
Dept: FAMILY MEDICINE | Facility: CLINIC | Age: 55
End: 2020-11-06

## 2020-11-06 ENCOUNTER — COMMUNICATION - HEALTHEAST (OUTPATIENT)
Dept: FAMILY MEDICINE | Facility: CLINIC | Age: 55
End: 2020-11-06

## 2020-11-06 RX ORDER — AMLODIPINE BESYLATE 5 MG/1
5 TABLET ORAL DAILY
Qty: 30 TABLET | Refills: 11 | Status: SHIPPED | OUTPATIENT
Start: 2020-11-06 | End: 2021-11-23

## 2020-11-25 ENCOUNTER — COMMUNICATION - HEALTHEAST (OUTPATIENT)
Dept: CARDIOLOGY | Facility: CLINIC | Age: 55
End: 2020-11-25

## 2020-12-02 ENCOUNTER — COMMUNICATION - HEALTHEAST (OUTPATIENT)
Dept: FAMILY MEDICINE | Facility: CLINIC | Age: 55
End: 2020-12-02

## 2020-12-02 DIAGNOSIS — E11.9 TYPE 2 DIABETES MELLITUS WITHOUT COMPLICATION, WITHOUT LONG-TERM CURRENT USE OF INSULIN (H): ICD-10-CM

## 2020-12-02 DIAGNOSIS — J45.30 MILD PERSISTENT ASTHMA WITHOUT COMPLICATION: ICD-10-CM

## 2020-12-02 DIAGNOSIS — R80.9 TYPE 2 DIABETES MELLITUS WITH MICROALBUMINURIA, WITHOUT LONG-TERM CURRENT USE OF INSULIN (H): ICD-10-CM

## 2020-12-02 DIAGNOSIS — E11.29 TYPE 2 DIABETES MELLITUS WITH MICROALBUMINURIA, WITHOUT LONG-TERM CURRENT USE OF INSULIN (H): ICD-10-CM

## 2020-12-04 RX ORDER — ALBUTEROL SULFATE 0.83 MG/ML
SOLUTION RESPIRATORY (INHALATION)
Qty: 180 ML | Refills: 0 | Status: SHIPPED | OUTPATIENT
Start: 2020-12-04

## 2020-12-12 ENCOUNTER — COMMUNICATION - HEALTHEAST (OUTPATIENT)
Dept: FAMILY MEDICINE | Facility: CLINIC | Age: 55
End: 2020-12-12

## 2020-12-17 ENCOUNTER — COMMUNICATION - HEALTHEAST (OUTPATIENT)
Dept: CARDIOLOGY | Facility: CLINIC | Age: 55
End: 2020-12-17

## 2021-01-07 ENCOUNTER — COMMUNICATION - HEALTHEAST (OUTPATIENT)
Dept: FAMILY MEDICINE | Facility: CLINIC | Age: 56
End: 2021-01-07

## 2021-01-07 DIAGNOSIS — E11.29 TYPE 2 DIABETES MELLITUS WITH MICROALBUMINURIA, WITHOUT LONG-TERM CURRENT USE OF INSULIN (H): ICD-10-CM

## 2021-01-07 DIAGNOSIS — R80.9 TYPE 2 DIABETES MELLITUS WITH MICROALBUMINURIA, WITHOUT LONG-TERM CURRENT USE OF INSULIN (H): ICD-10-CM

## 2021-01-18 ENCOUNTER — OFFICE VISIT - HEALTHEAST (OUTPATIENT)
Dept: FAMILY MEDICINE | Facility: CLINIC | Age: 56
End: 2021-01-18

## 2021-01-18 DIAGNOSIS — F32.1 MODERATE SINGLE CURRENT EPISODE OF MAJOR DEPRESSIVE DISORDER (H): ICD-10-CM

## 2021-01-18 DIAGNOSIS — I10 ESSENTIAL HYPERTENSION: ICD-10-CM

## 2021-01-18 DIAGNOSIS — Z12.31 ENCOUNTER FOR SCREENING MAMMOGRAM FOR BREAST CANCER: ICD-10-CM

## 2021-01-18 DIAGNOSIS — F41.1 GAD (GENERALIZED ANXIETY DISORDER): ICD-10-CM

## 2021-01-18 DIAGNOSIS — Z12.11 SPECIAL SCREENING FOR MALIGNANT NEOPLASMS, COLON: ICD-10-CM

## 2021-01-29 ENCOUNTER — RECORDS - HEALTHEAST (OUTPATIENT)
Dept: MAMMOGRAPHY | Facility: CLINIC | Age: 56
End: 2021-01-29

## 2021-01-29 DIAGNOSIS — Z12.31 ENCOUNTER FOR SCREENING MAMMOGRAM FOR MALIGNANT NEOPLASM OF BREAST: ICD-10-CM

## 2021-02-02 ENCOUNTER — COMMUNICATION - HEALTHEAST (OUTPATIENT)
Dept: FAMILY MEDICINE | Facility: CLINIC | Age: 56
End: 2021-02-02

## 2021-02-13 ENCOUNTER — COMMUNICATION - HEALTHEAST (OUTPATIENT)
Dept: FAMILY MEDICINE | Facility: CLINIC | Age: 56
End: 2021-02-13

## 2021-02-16 ENCOUNTER — COMMUNICATION - HEALTHEAST (OUTPATIENT)
Dept: FAMILY MEDICINE | Facility: CLINIC | Age: 56
End: 2021-02-16

## 2021-02-16 DIAGNOSIS — F32.1 MODERATE SINGLE CURRENT EPISODE OF MAJOR DEPRESSIVE DISORDER (H): ICD-10-CM

## 2021-02-16 DIAGNOSIS — F41.1 GAD (GENERALIZED ANXIETY DISORDER): ICD-10-CM

## 2021-03-08 ENCOUNTER — COMMUNICATION - HEALTHEAST (OUTPATIENT)
Dept: FAMILY MEDICINE | Facility: CLINIC | Age: 56
End: 2021-03-08

## 2021-03-08 DIAGNOSIS — E11.9 TYPE 2 DIABETES MELLITUS WITHOUT COMPLICATION, WITHOUT LONG-TERM CURRENT USE OF INSULIN (H): ICD-10-CM

## 2021-03-08 RX ORDER — ATORVASTATIN CALCIUM 40 MG/1
TABLET, FILM COATED ORAL
Qty: 90 TABLET | Refills: 3 | Status: SHIPPED | OUTPATIENT
Start: 2021-03-08 | End: 2022-03-22

## 2021-03-15 ENCOUNTER — COMMUNICATION - HEALTHEAST (OUTPATIENT)
Dept: FAMILY MEDICINE | Facility: CLINIC | Age: 56
End: 2021-03-15

## 2021-04-15 ENCOUNTER — COMMUNICATION - HEALTHEAST (OUTPATIENT)
Dept: FAMILY MEDICINE | Facility: CLINIC | Age: 56
End: 2021-04-15

## 2021-05-06 ENCOUNTER — RECORDS - HEALTHEAST (OUTPATIENT)
Dept: ADMINISTRATIVE | Facility: OTHER | Age: 56
End: 2021-05-06

## 2021-05-14 ENCOUNTER — COMMUNICATION - HEALTHEAST (OUTPATIENT)
Dept: FAMILY MEDICINE | Facility: CLINIC | Age: 56
End: 2021-05-14

## 2021-05-14 DIAGNOSIS — J45.30 MILD PERSISTENT ASTHMA WITHOUT COMPLICATION: ICD-10-CM

## 2021-05-15 RX ORDER — ALBUTEROL SULFATE 90 UG/1
AEROSOL, METERED RESPIRATORY (INHALATION)
Qty: 18 G | Refills: 2 | Status: SHIPPED | OUTPATIENT
Start: 2021-05-15 | End: 2022-04-25

## 2021-05-17 ENCOUNTER — RECORDS - HEALTHEAST (OUTPATIENT)
Dept: FAMILY MEDICINE | Facility: CLINIC | Age: 56
End: 2021-05-17

## 2021-05-24 ENCOUNTER — OFFICE VISIT - HEALTHEAST (OUTPATIENT)
Dept: FAMILY MEDICINE | Facility: CLINIC | Age: 56
End: 2021-05-24

## 2021-05-24 DIAGNOSIS — E11.29 TYPE 2 DIABETES MELLITUS WITH MICROALBUMINURIA, WITHOUT LONG-TERM CURRENT USE OF INSULIN (H): ICD-10-CM

## 2021-05-24 DIAGNOSIS — F10.11 ALCOHOL ABUSE, IN REMISSION: ICD-10-CM

## 2021-05-24 DIAGNOSIS — E66.01 MORBID OBESITY (H): ICD-10-CM

## 2021-05-24 DIAGNOSIS — Z11.4 ENCOUNTER FOR SCREENING FOR HIV: ICD-10-CM

## 2021-05-24 DIAGNOSIS — F43.10 POST TRAUMATIC STRESS DISORDER (PTSD): ICD-10-CM

## 2021-05-24 DIAGNOSIS — G47.09 OTHER INSOMNIA: ICD-10-CM

## 2021-05-24 DIAGNOSIS — R80.9 TYPE 2 DIABETES MELLITUS WITH MICROALBUMINURIA, WITHOUT LONG-TERM CURRENT USE OF INSULIN (H): ICD-10-CM

## 2021-05-24 DIAGNOSIS — F41.1 GAD (GENERALIZED ANXIETY DISORDER): ICD-10-CM

## 2021-05-24 LAB
ALBUMIN SERPL-MCNC: 3.7 G/DL (ref 3.5–5)
ALP SERPL-CCNC: 102 U/L (ref 45–120)
ALT SERPL W P-5'-P-CCNC: 15 U/L (ref 0–45)
ANION GAP SERPL CALCULATED.3IONS-SCNC: 13 MMOL/L (ref 5–18)
AST SERPL W P-5'-P-CCNC: 18 U/L (ref 0–40)
BILIRUB DIRECT SERPL-MCNC: 0.3 MG/DL
BILIRUB SERPL-MCNC: 0.8 MG/DL (ref 0–1)
BUN SERPL-MCNC: 10 MG/DL (ref 8–22)
CALCIUM SERPL-MCNC: 9.2 MG/DL (ref 8.5–10.5)
CHLORIDE BLD-SCNC: 104 MMOL/L (ref 98–107)
CHOLEST SERPL-MCNC: 135 MG/DL
CO2 SERPL-SCNC: 24 MMOL/L (ref 22–31)
CREAT SERPL-MCNC: 0.76 MG/DL (ref 0.6–1.1)
FASTING STATUS PATIENT QL REPORTED: NO
GFR SERPL CREATININE-BSD FRML MDRD: >60 ML/MIN/1.73M2
GLUCOSE BLD-MCNC: 164 MG/DL (ref 70–125)
HBA1C MFR BLD: 6.9 %
HDLC SERPL-MCNC: 38 MG/DL
HIV 1+2 AB+HIV1 P24 AG SERPL QL IA: NEGATIVE
LDLC SERPL CALC-MCNC: 59 MG/DL
POTASSIUM BLD-SCNC: 4 MMOL/L (ref 3.5–5)
PROT SERPL-MCNC: 7 G/DL (ref 6–8)
SODIUM SERPL-SCNC: 141 MMOL/L (ref 136–145)
TRIGL SERPL-MCNC: 190 MG/DL

## 2021-05-24 RX ORDER — IBUPROFEN 800 MG/1
800 TABLET, FILM COATED ORAL
Status: SHIPPED | COMMUNITY
Start: 2021-05-24 | End: 2022-03-15

## 2021-05-24 RX ORDER — ASPIRIN 325 MG
325 TABLET ORAL
Status: SHIPPED | COMMUNITY
Start: 2021-05-24

## 2021-05-24 ASSESSMENT — ANXIETY QUESTIONNAIRES
3. WORRYING TOO MUCH ABOUT DIFFERENT THINGS: NEARLY EVERY DAY
7. FEELING AFRAID AS IF SOMETHING AWFUL MIGHT HAPPEN: NEARLY EVERY DAY
1. FEELING NERVOUS, ANXIOUS, OR ON EDGE: NEARLY EVERY DAY
5. BEING SO RESTLESS THAT IT IS HARD TO SIT STILL: NEARLY EVERY DAY
6. BECOMING EASILY ANNOYED OR IRRITABLE: NEARLY EVERY DAY
2. NOT BEING ABLE TO STOP OR CONTROL WORRYING: NEARLY EVERY DAY
GAD7 TOTAL SCORE: 21
4. TROUBLE RELAXING: NEARLY EVERY DAY
IF YOU CHECKED OFF ANY PROBLEMS ON THIS QUESTIONNAIRE, HOW DIFFICULT HAVE THESE PROBLEMS MADE IT FOR YOU TO DO YOUR WORK, TAKE CARE OF THINGS AT HOME, OR GET ALONG WITH OTHER PEOPLE: EXTREMELY DIFFICULT

## 2021-05-24 ASSESSMENT — MIFFLIN-ST. JEOR: SCORE: 1761.26

## 2021-05-24 ASSESSMENT — PATIENT HEALTH QUESTIONNAIRE - PHQ9: SUM OF ALL RESPONSES TO PHQ QUESTIONS 1-9: 26

## 2021-05-25 ENCOUNTER — COMMUNICATION - HEALTHEAST (OUTPATIENT)
Dept: FAMILY MEDICINE | Facility: CLINIC | Age: 56
End: 2021-05-25

## 2021-05-26 ASSESSMENT — PATIENT HEALTH QUESTIONNAIRE - PHQ9
SUM OF ALL RESPONSES TO PHQ QUESTIONS 1-9: 22
SUM OF ALL RESPONSES TO PHQ QUESTIONS 1-9: 14

## 2021-05-27 ASSESSMENT — PATIENT HEALTH QUESTIONNAIRE - PHQ9: SUM OF ALL RESPONSES TO PHQ QUESTIONS 1-9: 24

## 2021-05-28 ASSESSMENT — ASTHMA QUESTIONNAIRES
ACT_TOTALSCORE: 12
ACT_TOTALSCORE: 15

## 2021-05-28 ASSESSMENT — ANXIETY QUESTIONNAIRES: GAD7 TOTAL SCORE: 20

## 2021-05-29 NOTE — PROGRESS NOTES
Subjective:  53 y.o. female with concerns of follow up.    Has no concerns about diabetes.  Continues to be diet controlled.  Significant knee pain.  Opioid use.  Needs CSA.  We discussed that today.  Blood pressure is well controlled.  She is due for colon cancer screening.  This was addressed.  She would wish to have a Cologuard test done.  Has been referred for colonoscopy in the past but cannot coordinate the prep and travel.    Is having some mood disturbance.  Having had a few deaths in the family in the last couple months of taking a toll.  She does not want to consider medicine but would consider seeing a therapist.  Denies any thoughts about suicide.    Having significant nasal congestion and itching.  Previous history of allergies.  Has not been current on antihistamines.    Asthma Control Test:  In the past 4 weeks, how much of the time did your asthma keep you from getting as much done at work, school, or at home?: 4 (7/19/2018  1:00 PM)  During the past 4 weeks, how often have you had shortness of breath?: 4 (7/19/2018  1:00 PM)  During the past 4 weeks, how often did your asthma symptoms (wheezing, coughing, shortness of breath, chest tightness or pain) wake you up at night or earlier in the morning?: 2 (7/19/2018  1:00 PM)  During the past 4 weeks, how often have you used your rescue inhaler or nebulizer medication (such as albuterol)?: 2 (7/19/2018  1:00 PM)  How would you rate your asthma control during the past 4 weeks?: 3 (7/19/2018  1:00 PM)  ACT Total Score: (!) 15 (7/19/2018  1:00 PM)  In the past 12 months, have you visited the emergency room due to your asthma?: No (7/19/2018  1:00 PM)  In the past 12 months, have you been hospitalized due to your asthma?: No (7/19/2018  1:00 PM)       Outpatient Medications Prior to Visit   Medication Sig Dispense Refill     venlafaxine (EFFEXOR-XR) 150 MG 24 hr capsule Take 1 capsule (150 mg total) by mouth daily. 90 capsule 1     albuterol (PROVENTIL) 2.5  mg /3 mL (0.083 %) nebulizer solution NEBULIZE 1 VIAL BY MOUTH EVERY 4 HOURS AS NEEDED FOR WHEEZING. 180 mL 0     albuterol (VENTOLIN HFA) 90 mcg/actuation inhaler Inhale 2 puffs every 4 (four) hours as needed for wheezing. 18 g 1     amitriptyline (ELAVIL) 25 MG tablet Take 1 tablet (25 mg total) by mouth bedtime. Take one tablet at night 30 tablet 3     atorvastatin (LIPITOR) 40 MG tablet Take 1 tablet (40 mg total) by mouth daily. 90 tablet 3     budesonide-formoterol (SYMBICORT) 160-4.5 mcg/actuation inhaler Inhale 2 puffs 2 (two) times a day. 1 Inhaler 12     cetirizine (ZYRTEC) 10 MG tablet Take 1 tablet (10 mg total) by mouth daily. 90 tablet 1     HYDROcodone-acetaminophen (NORCO) 7.5-325 mg per tablet Take 1 tablet by mouth three times per day as needed for pain. 30 tablet 0     hydrOXYzine HCl (ATARAX) 25 MG tablet Take 1 tablet (25 mg total) by mouth every 8 (eight) hours as needed for itching. 90 tablet 3     ibuprofen (ADVIL,MOTRIN) 600 MG tablet Take 1 tablet (600 mg total) by mouth 3 (three) times a day with meals. 60 tablet 0     losartan-hydrochlorothiazide (HYZAAR) 100-25 mg per tablet Take 1 tablet by mouth daily. 90 tablet 3     metoprolol succinate (TOPROL-XL) 50 MG 24 hr tablet Take 1 tablet (50 mg total) by mouth daily. 90 tablet 3     traZODone (DESYREL) 150 MG tablet Take 1 tablet (150 mg total) by mouth bedtime. 90 tablet 2     triamcinolone (KENALOG) 0.1 % cream APPLY TOPICALLY TO RASH TWO TIMES A DAY FOR 2 TO 3 WEEKS. 80 g 0     No facility-administered medications prior to visit.       Social History     Tobacco Use   Smoking Status Never Smoker   Smokeless Tobacco Never Used   Tobacco Comment    no second hand smoke exposure      Objective:  /88 (Patient Site: Left Arm, Patient Position: Sitting, Cuff Size: Adult Large)   Pulse 76   Resp 12   Wt (!) 266 lb (120.7 kg)   LMP 06/08/2013   BMI 42.61 kg/m    GENERAL: alert, not distressed  EARS: normal tympanic membranes and  external auditory canals bilaterally  PHARYNX: no erythema or exudates  MOUTH: well hydrated mucosa, no lesions  NECK: no lymphadenopathy or thyroid nodules  CHEST: clear, no rales, rhonchi, or wheezes  CARDIAC: regular without murmur, gallop, or rub  ABDOMEN: obese, soft, non tender, non distended, normal bowel sounds    Recent Results (from the past 240 hour(s))   Glycosylated Hemoglobin A1c    Collection Time: 05/28/19  1:45 PM   Result Value Ref Range    Hemoglobin A1c 6.0 3.5 - 6.0 %   Basic Metabolic Panel    Collection Time: 05/28/19  1:45 PM   Result Value Ref Range    Sodium 140 136 - 145 mmol/L    Potassium 4.0 3.5 - 5.0 mmol/L    Chloride 101 98 - 107 mmol/L    CO2 26 22 - 31 mmol/L    Anion Gap, Calculation 13 5 - 18 mmol/L    Glucose 106 70 - 125 mg/dL    Calcium 9.8 8.5 - 10.5 mg/dL    BUN 13 8 - 22 mg/dL    Creatinine 0.77 0.60 - 1.10 mg/dL    GFR MDRD Af Amer >60 >60 mL/min/1.73m2    GFR MDRD Non Af Amer >60 >60 mL/min/1.73m2   Microalbumin, Random Urine    Collection Time: 05/28/19  2:03 PM   Result Value Ref Range    Microalbumin, Random Urine <0.50 0.00 - 1.99 mg/dL    Creatinine, Urine 63.5 mg/dL    Microalbumin/Creatinine Ratio Random Urine  <=19.9 mg/g         Assessment and Plan:   1. Type 2 diabetes mellitus without complication, without long-term current use of insulin (H)  Good control with diet.  - atorvastatin (LIPITOR) 40 MG tablet; Take 1 tablet (40 mg total) by mouth daily.  Dispense: 90 tablet; Refill: 3    2. Morbid obesity (H)  Advised exercise and weight loss.  Difficult with knee pain and limited mobility.    3. Moderate single current episode of major depressive disorder (H)  Would like to see a counselor.   Referred.  - Ambulatory referral to Psychology    4. Mild persistent asthma without complication  Good control.  Continue current meds.  - budesonide-formoterol (SYMBICORT) 160-4.5 mcg/actuation inhaler; Inhale 2 puffs 2 (two) times a day.  Dispense: 1 Inhaler; Refill: 12  -  albuterol (VENTOLIN HFA) 90 mcg/actuation inhaler; Inhale 2 puffs every 4 (four) hours as needed for wheezing.  Dispense: 18 g; Refill: 1  - albuterol (PROVENTIL) 2.5 mg /3 mL (0.083 %) nebulizer solution; NEBULIZE 1 VIAL BY MOUTH EVERY 4 HOURS AS NEEDED FOR WHEEZING.  Dispense: 180 mL; Refill: 0    5. Essential hypertension  Good control.  No med changes.  - losartan-hydrochlorothiazide (HYZAAR) 100-25 mg per tablet; Take 1 tablet by mouth daily.  Dispense: 90 tablet; Refill: 3  - metoprolol succinate (TOPROL-XL) 100 MG 24 hr tablet; Take 1 tablet (100 mg total) by mouth daily.  Dispense: 90 tablet; Refill: 0    6. Screen for colon cancer  - [facility]    7. Allergic rhinitis  Seems to be causing some problems now.  Add anti-H's and nasal steroid.  - hydrOXYzine HCl (ATARAX) 25 MG tablet; Take 1 tablet (25 mg total) by mouth every 8 (eight) hours as needed for itching.  Dispense: 90 tablet; Refill: 3  - cetirizine (ZYRTEC) 10 MG tablet; Take 1 tablet (10 mg total) by mouth daily.  Dispense: 90 tablet; Refill: 1  - fluticasone propionate (FLONASE) 50 mcg/actuation nasal spray; 2 sprays into each nostril daily.  Dispense: 10 g; Refill: 11    8. FOX (generalized anxiety disorder)  - hydrOXYzine HCl (ATARAX) 25 MG tablet; Take 1 tablet (25 mg total) by mouth every 8 (eight) hours as needed for itching.  Dispense: 90 tablet; Refill: 3    9. Eczema, unspecified type  - triamcinolone (KENALOG) 0.1 % cream; APPLY TOPICALLY TO RASH TWO TIMES A DAY FOR 2 TO 3 WEEKS.  Dispense: 80 g; Refill: 0    10. OA (osteoarthritis) of knee  11. Osteoarthrosis involving lower leg  12. Chronic, continuous use of opioids  13. Long term current use of opiate analgesic   CSA agreement today.  Discussed.  Add UDS if able on sample gathered for DM-II  - HYDROcodone-acetaminophen (NORCO) 7.5-325 mg per tablet; Take 1 tablet by mouth 3 (three) times a day as needed for pain.  Dispense: 90 tablet; Refill: 0  - Drug Abuse 1+, Urine

## 2021-05-29 NOTE — TELEPHONE ENCOUNTER
FYI - Status Update  Who is Calling: Patient  Update: See message below. Patient wants the walker order faxed to [facility].    Fax:  [phone]  Okay to leave a detailed message?:  No return call needed

## 2021-05-29 NOTE — TELEPHONE ENCOUNTER
Prior Authorization Request  Who s requesting:  Pharmacy  Pharmacy Name and Location: [facility] #[id number]  Medication Name: Norco 7.5-325 mg tablets  Insurance Plan: Patient states Medicare is the only insurance she has  Insurance Member ID Number:  [id number]  Informed patient that prior authorizations can take up to 10 business days for response:   Yes  Okay to leave a detailed message: Yes    Pharmacist states this prescription for Norco needs a prior authorization because the quantity is greater than one week worth of medication.

## 2021-05-29 NOTE — TELEPHONE ENCOUNTER
Orders being requested: walker  Reason service is needed/diagnosis: Patient needs a replacement because her old walker was damaged.  When are orders needed by: as soon as possible   Where to send Orders: Patient stated she will call back with this information  Okay to leave detailed message?  No

## 2021-05-29 NOTE — TELEPHONE ENCOUNTER
Central PA team  [phone]  Pool: [facility] ([id number])          PA has been initiated.       PA form completed and faxed insurance via Cover My Meds     Key:  Key: [id number] - PA Case ID: [id number]    Medication:  HYDROcodone-Acetaminophen 7.5-325MG tablets      Insurance:  [employer]         Response will be received via fax and may take up to 5-10 business days depending on plan

## 2021-05-29 NOTE — TELEPHONE ENCOUNTER
Patient Returning Call  Reason for call:  Patient returning call  Information relayed to patient:  Patient stated [facility] does not take my insurance but [facility] [address] does Please fax to [phone]  Patient has additional questions:  Yes  If YES, what are your questions/concerns:  Please return call to confirm fax.  Okay to leave a detailed message?: Yes

## 2021-05-30 NOTE — TELEPHONE ENCOUNTER
Who is calling:  Patient   Reason for Call:  She is calling to check the status of the below issue. She states she is out of medication and asking for this to be addressed as soon as possible.  Date of last appointment with primary care: 5/28/2019  Okay to leave a detailed message: No

## 2021-05-30 NOTE — TELEPHONE ENCOUNTER
RN cannot approve Refill Request    RN can NOT refill this medication PCP messaged that patient is overdue for Labs. Last office visit: Visit date not found Last Physical: Visit date not found Last MTM visit: Visit date not found Last visit same specialty: Visit date not found.  Next visit within 3 mo: Visit date not found  Next physical within 3 mo: Visit date not found      [facility] Connection Triage/Med Refill 7/18/2019    Requested Prescriptions   Pending Prescriptions Disp Refills     venlafaxine (EFFEXOR-XR) 150 MG 24 hr capsule 90 capsule 1     Sig: Take 1 capsule (150 mg total) by mouth daily.       Venlafaxine/Desvenlafaxine Refill Protocol Failed - 7/18/2019  5:47 PM        Failed - LFT or AST or ALT in last year     AST   Date Value Ref Range Status   10/18/2010 39 8 - 39 U/L Final     ALT   Date Value Ref Range Status   10/18/2010 8 3 - 42 U/L Final                Passed - Fasting lipid cascade in last year     Cholesterol   Date Value Ref Range Status   07/19/2018 216 (H) <=199 mg/dL Final     Triglycerides   Date Value Ref Range Status   07/19/2018 130 <=149 mg/dL Final     HDL Cholesterol   Date Value Ref Range Status   07/19/2018 45 (L) >=50 mg/dL Final     LDL Calculated   Date Value Ref Range Status   07/19/2018 145 (H) <=129 mg/dL Final     Patient Fasting > 8hrs?   Date Value Ref Range Status   07/19/2018 Unknown  Final             Passed - PCP or prescribing provider visit in last year     Last office visit with prescriber/PCP: Visit date not found OR same dept: Visit date not found OR same specialty: Visit date not found  Last physical: Visit date not found Last MTM visit: Visit date not found   Next visit within 3 mo: Visit date not found  Next physical within 3 mo: Visit date not found  Prescriber OR PCP: Shyla Ferguson RN  Last diagnosis associated with med order: 1. Moderate single current episode of major depressive disorder (H)  - venlafaxine (EFFEXOR-XR) 150 MG 24 hr  capsule; Take 1 capsule (150 mg total) by mouth daily.  Dispense: 90 capsule; Refill: 1    If protocol passes may refill for 12 months if within 3 months of last provider visit (or a total of 15 months).             Passed - Blood Pressure in last year     BP Readings from Last 1 Encounters:   05/28/19 128/88

## 2021-05-30 NOTE — TELEPHONE ENCOUNTER
Medication Question or Clarification  Who is calling: Pharmacy: [facility]  What medication are you calling about? (include dose and sig) Hydrocodone -acetaminophen 7.5- 325 mg per tablet . Take 1 tablet by mouth 3 (three) times a day as needed for pain  Who prescribed the medication?: Aj Conrad MD  What is your question/concern?: pharmacy is questioning  About 90 days supply prescription According to the new law it needs to be documented  If it is30 day supply for chronic pain or acute pain for 7 days supply . . Please advise.  [id number]  Okay to leave a detailed message?: No  Site CMT - Please call the pharmacy to obtain any additional needed information.

## 2021-05-30 NOTE — TELEPHONE ENCOUNTER
----- Message from Aj Conrad MD sent at 7/15/2019  8:45 AM CDT -----  Please call, we talked about anastacio at her recent visit but we have not received a result, yet.  Encourage her to complete the test.

## 2021-05-30 NOTE — TELEPHONE ENCOUNTER
Patient will be out of medication on Monday July 22 .  Please review.     Controlled Substance Refill Request  Medication Name:   Requested Prescriptions     Pending Prescriptions Disp Refills     HYDROcodone-acetaminophen (NORCO) 7.5-325 mg per tablet 90 tablet 0     Sig: Take 1 tablet by mouth 3 (three) times a day as needed for pain.     Date Last Fill: 5/28/19  [facility]     Submit electronically to pharmacy  Controlled Substance Agreement Date Scanned:   Encounter-Level CSA Scan Date:    There are no encounter-level csa scan date.       Last office visit with prescriber/PCP: 5/28/2019 Aj Conrad MD OR same dept: 5/28/2019 Aj Conrad MD OR same specialty: 5/28/2019 Aj Conrad MD  Last physical: 7/19/2018 Last MTM visit: Visit date not found

## 2021-05-30 NOTE — TELEPHONE ENCOUNTER
Patient Returning Call  Reason for call:  Patient returning call to check on the status of this request.  Information relayed to patient:  Patient was advised as listed below and patient is requesting this to be clarified with the pharmacy to fill this Rx today.  Patient has additional questions:  No  If YES, what are your questions/concerns:  none  Okay to leave a detailed message?: Yes

## 2021-05-30 NOTE — TELEPHONE ENCOUNTER
FYI - Status Update  Who is Calling: Patient  Update: The below message from Dr. Conrad was relayed to the pharmacy.   Patient notified prescription was ready for .  Okay to leave a detailed message?:  No return call needed

## 2021-05-30 NOTE — TELEPHONE ENCOUNTER
Controlled Substance Refill Request  Medication:   Requested Prescriptions     Pending Prescriptions Disp Refills     HYDROcodone-acetaminophen (NORCO) 7.5-325 mg per tablet [Pharmacy Med Name: HYDROcodone-Acetaminophen Oral Tablet 7.5-325 MG] 90 tablet 0     Sig: Take 1 tablet by mouth 3 (three) times a day as needed for pain.     Date Last Fill: 5/28/19  [facility]   Submit electronically to pharmacy  Controlled Substance Agreement on File:   Encounter-Level CSA Scan Date:    There are no encounter-level csa scan date.       Last office visit: Last office visit pertaining to requested medication was 5/28/19.

## 2021-05-31 VITALS — HEIGHT: 66 IN | BODY MASS INDEX: 45.16 KG/M2 | WEIGHT: 281 LBS

## 2021-05-31 VITALS — WEIGHT: 285 LBS | BODY MASS INDEX: 45.8 KG/M2 | HEIGHT: 66 IN

## 2021-05-31 VITALS — BODY MASS INDEX: 45.17 KG/M2 | WEIGHT: 282 LBS

## 2021-05-31 NOTE — TELEPHONE ENCOUNTER
Controlled Substance Refill Request  Medication:   Requested Prescriptions     Pending Prescriptions Disp Refills     metoprolol succinate (TOPROL-XL) 100 MG 24 hr tablet [Pharmacy Med Name: Metoprolol Succinate ER Oral Tablet Extended Release 24 Hour 100 MG] 90 tablet 0     Sig: Take 1 tablet (100 mg total) by mouth daily     HYDROcodone-acetaminophen (NORCO) 7.5-325 mg per tablet [Pharmacy Med Name: HYDROcodone-Acetaminophen Oral Tablet 7.5-325 MG] 90 tablet 0     Sig: Take 1 tablet by mouth 3 (three) times a day as needed for pain.     Date Last Fill: 7/19/19  [id number]   Submit electronically to pharmacy  Controlled Substance Agreement on File:   Encounter-Level CSA Scan Date:    There are no encounter-level csa scan date.       Last office visit: Last office visit pertaining to requested medication was 5/28/19.      Rx renewed per Medication Renewal policy  Metoproltony Nguyen RN Care Connection Triage/Medication Refill

## 2021-06-01 VITALS — BODY MASS INDEX: 41.62 KG/M2 | HEIGHT: 66 IN | WEIGHT: 259 LBS

## 2021-06-01 NOTE — TELEPHONE ENCOUNTER
Controlled Substance Refill Request  Medication:   Requested Prescriptions     Pending Prescriptions Disp Refills     HYDROcodone-acetaminophen (NORCO) 7.5-325 mg per tablet [Pharmacy Med Name: HYDROcodone-Acetaminophen Oral Tablet 7.5-325 MG] 90 tablet 0     Sig: Take 1 tablet by mouth 3 (three) times a day as needed for pain.     Date Last Fill: 8/26/19  [id number]   Submit electronically to pharmacy  Controlled Substance Agreement on File:   Encounter-Level CSA Scan Date:    There are no encounter-level csa scan date.       Last office visit: Last office visit pertaining to requested medication was 5/28/19.

## 2021-06-01 NOTE — TELEPHONE ENCOUNTER
Medication Question or Clarification  Who is calling: Patient  What medication are you calling about? (include dose and sig)   HYDROcodone-acetaminophen (NORCO) 7.5-325 mg per tablet  1 tablet, Oral, 3 times daily PRN  Edit       Summary: Take 1 tablet by mouth 3 (three) times a day as needed for pain., Starting Mon 8/26/2019        Who prescribed the medication?: Dr Conrad  What is your question/concern?: Patient is asking to  the written script tomorrow 9/26/19. Please call patient to confirm the script is ready for .      [facility]  Okay to leave a detailed message?: Yes  Site CMT - Please call the pharmacy to obtain any additional needed information.

## 2021-06-01 NOTE — TELEPHONE ENCOUNTER
Other-DAUGHTER IN [employer]  picked up CONTROLLED RX on 09/27/2019 OK PER PT CALLED JOHAN  ADVISED HER TO COME IN AND ADD ON  CONSENT FOR COMMUNCATION . Completing task.

## 2021-06-02 NOTE — TELEPHONE ENCOUNTER
Controlled Substance Refill Request  Medication Name:   Requested Prescriptions     Pending Prescriptions Disp Refills     HYDROcodone-acetaminophen (NORCO) 7.5-325 mg per tablet 90 tablet 0     Sig: Take 1 tablet by mouth 3 (three) times a day as needed for pain.     Date Last Fill: 9/26/19  [facility]      Submit electronically to pharmacy  Controlled Substance Agreement Date Scanned:   Encounter-Level CSA Scan Date:    There are no encounter-level csa scan date.       Last office visit with prescriber/PCP: 5/28/2019 Aj Conrad MD OR same dept: 5/28/2019 Aj Conrad MD OR same specialty: 5/28/2019 Aj Conrad MD  Last physical: 7/19/2018 Last MTM visit: Visit date not found

## 2021-06-03 ENCOUNTER — COMMUNICATION - HEALTHEAST (OUTPATIENT)
Dept: FAMILY MEDICINE | Facility: CLINIC | Age: 56
End: 2021-06-03

## 2021-06-03 VITALS
TEMPERATURE: 98.5 F | HEART RATE: 88 BPM | HEIGHT: 66 IN | DIASTOLIC BLOOD PRESSURE: 88 MMHG | SYSTOLIC BLOOD PRESSURE: 138 MMHG | BODY MASS INDEX: 43.07 KG/M2 | WEIGHT: 268 LBS

## 2021-06-03 VITALS — WEIGHT: 266 LBS | BODY MASS INDEX: 42.61 KG/M2

## 2021-06-03 NOTE — TELEPHONE ENCOUNTER
Cannot increase the narcotic pain reliever dose.  Will have to see shoulder drSue If pain is that bad---there might not be further mediciens available.  Could see pain clinic specialist is did not want to proceed with surgery.    Is the swelling better after the shot?

## 2021-06-03 NOTE — TELEPHONE ENCOUNTER
Called and left message for pt to return call.# 1 okay to relay message upon return call. Thank you

## 2021-06-03 NOTE — TELEPHONE ENCOUNTER
Question following Office Visit  When did you see your provider: Yesterday  What is your question:     1)  Patient states that Dr. Conrad was suppose to send a different pain medication to her pharmacy.  She states she had discussed with him that the hydrocodone-acetaminophen 7.5-325 mg tablets are no longer strong enough for her.    2)  Patient received a steroid injection yesterday from Dr. Conrad.  There is a lump and pain in the injection area.  The pain kept her awake last night.  Is this normal?    Ok to leave a detailed message for patient.

## 2021-06-03 NOTE — PROGRESS NOTES
Assessment and Plan:     1. Type 2 diabetes mellitus without complication, without long-term current use of insulin (H)  Recheck labs, see ophthalmology  - Glycosylated Hemoglobin A1c  - LDL Cholesterol, Direct  - Ambulatory referral to Ophthalmology  - Comprehensive Metabolic Panel    2. Osteoarthritis of right shoulder, unspecified osteoarthritis type  Shoulder injection.  Continue current chronic opioids  - lidocaine 10 mg/mL (1 %) injection 4 mL  - methylPREDNISolone acetate injection 80 mg (DEPO-MEDROL)  - Right Shoulder Injection    3. Need for hepatitis C screening test  - Hepatitis C Antibody (Anti-HCV)    4. Need for influenza vaccination  - Influenza, Recombinant, Inj, Quadrivalent, PF, 18+YRS     Has cologard at home. Encouraged to complete it.    The patient's current medical problems were reviewed.    I have had an Advance Directives discussion with the patient.     The following health maintenance schedule was reviewed with the patient and provided in printed form in the after visit summary:   Health Maintenance   Topic Date Due     ASTHMA ACTION PLAN  1965     HEPATITIS C SCREENING  1965     DIABETIC EYE EXAM  1965     HIV SCREENING  08/02/1978     COLONOSCOPY  08/02/2013     ZOSTER VACCINES (1 of 2) 08/02/2013     MEDICARE ANNUAL WELLNESS VISIT  07/19/2019     LIPID  07/19/2019     INFLUENZA VACCINE RULE BASED (1) 08/01/2019     PAP SMEAR  04/22/2020     HPV TEST  04/22/2020     TD 18+ HE  10/18/2020     DEPRESSION FOLLOW UP  11/28/2019     A1C  11/28/2019     MAMMOGRAM  04/16/2020     URINE DRUG SCREEN  05/28/2020     BMP  05/28/2020     DIABETIC FOOT EXAM  05/28/2020     MICROALBUMIN  05/28/2020     TREATMENT AGREEMENT FOR CHRONIC PAIN MANAGEMENT  06/05/2020     ASTHMA CONTROL TEST  11/12/2020     ADVANCE CARE PLANNING  07/19/2023     PNEUMOCOCCAL IMMUNIZATION 19-64 MEDIUM RISK  Completed     TDAP ADULT ONE TIME DOSE  Completed        Subjective:   Chief Complaint: Rebecca Smalls  is an 54 y.o. female here for an Annual Wellness visit.     HPI:    Lots of right shoulder pain.  Had previously planned shoulder replacement surgery but eventually was fearful and cancelled.  Cannot sleep on left shoulder.  Really painful and she wants to get a steroid shot which she has done before.    Saddened as brother  yesterday from liver cancer  He drank a lot of alcohol.  She has 1-2 glasses of beer or wine per day.    Cannot sleep.  Sleeps usually from 6 am to 2 pm, then up for the rest of the night.    PHQ-9:  Little interest or pleasure in doing things: Nearly every day  Feeling down, depressed, or hopeless: Nearly every day  Trouble falling or staying asleep, or sleeping too much: More than half the days  Feeling tired or having little energy: Nearly every day  Poor appetite or overeating: Nearly every day  Feeling bad about yourself - or that you are a failure or have let yourself or your family down: More than half the days  Trouble concentrating on things, such as reading the newspaper or watching television: More than half the days  Moving or speaking so slowly that other people could have noticed. Or the opposite - being so fidgety or restless that you have been moving around a lot more than usual: More than half the days  Thoughts that you would be better off dead, or of hurting yourself in some way: More than half the days  PHQ-9 Total Score: 22  If you checked off any problems, how difficult have these problems made it for you to do your work, take care of things at home, or get along with other people?: Very difficult     Asthma Control Test:  In the past 4 weeks, how much of the time did your asthma keep you from getting as much done at work, school, or at home?: 3 (2019  1:00 PM)  During the past 4 weeks, how often have you had shortness of breath?: 3 (2019  1:00 PM)  During the past 4 weeks, how often did your asthma symptoms (wheezing, coughing, shortness of breath, chest  tightness or pain) wake you up at night or earlier in the morning?: 2 (11/12/2019  1:00 PM)  During the past 4 weeks, how often have you used your rescue inhaler or nebulizer medication (such as albuterol)?: 1 (11/12/2019  1:00 PM)  How would you rate your asthma control during the past 4 weeks?: 3 (11/12/2019  1:00 PM)  ACT Total Score: (!) 12 (11/12/2019  1:00 PM)  In the past 12 months, have you visited the emergency room due to your asthma?: No (11/12/2019  1:00 PM)  In the past 12 months, have you been hospitalized due to your asthma?: No (11/12/2019  1:00 PM)     Review of Systems:   Please see above.  The rest of the review of systems are negative for all systems.    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     Obstructive Sleep Apnea     Hypercholesterolemia     Essential Hypertension     Mild persistent asthma without complication     Osteoarthritis Of The Knee     Tendonitis Supraspinatus     Biceps tendinitis of left upper extremity     Alcoholism (H)     FOX (generalized anxiety disorder)     OCD (obsessive compulsive disorder)     Panic disorder without agoraphobia     Severe recurrent major depression (H)     Post traumatic stress disorder (PTSD)     Insomnia     Glenoid chondromalacia of left shoulder     Shoulder impingement syndrome, left     Type 2 diabetes mellitus without complication     Morbid obesity (H)     Chronic, continuous use of opioids     Past Medical History:   Diagnosis Date     Alcoholism (H)      Anxiety      Asthma     Mild Persistent without complication     Diabetes mellitus (H)      Hypercholesteremia      Hypertension      Obesity      Obsessive compulsive disorder      Os acromiale of left shoulder      TITA (obstructive sleep apnea)      Osteoarthritis     Knee     Panic disorder without agoraphobia      Peroneal nerve  "palsy     Left Leg     PTSD (post-traumatic stress disorder)      Severe recurrent major depression (H)      Supraspinatus tendonitis       Past Surgical History:   Procedure Laterality Date     APPENDECTOMY       JOINT REPLACEMENT      knee     OOPHORECTOMY Right      AZ LIGATE FALLOPIAN TUBE      Description: Tubal Ligation;  Recorded: 2011;      Family History   Problem Relation Age of Onset     Asthma Son      Diabetes Brother      HIV Nephew         AIDS- nephew (sister's son)  ate 20s; contracted from his wife, had twins-daughter  of AIDS age 8; son o     Liver cancer Sister          age 50; 1997     Breast cancer Sister 50              Heart disease Sister         enlarged heart     Heart disease Brother         enlarged heart     Asthma Other         niece and grandson     Cancer Maternal Aunt         \"went thru whole body..not sure where it started\",  in 30s      Social History     Socioeconomic History     Marital status:      Spouse name: Not on file     Number of children: Not on file     Years of education: Not on file     Highest education level: Not on file   Occupational History     Not on file   Social Needs     Financial resource strain: Not on file     Food insecurity:     Worry: Not on file     Inability: Not on file     Transportation needs:     Medical: Not on file     Non-medical: Not on file   Tobacco Use     Smoking status: Never Smoker     Smokeless tobacco: Never Used     Tobacco comment: no second hand smoke exposure   Substance and Sexual Activity     Alcohol use: Yes     Comment: rare     Drug use: No     Sexual activity: Not on file   Lifestyle     Physical activity:     Days per week: Not on file     Minutes per session: Not on file     Stress: Not on file   Relationships     Social connections:     Talks on phone: Not on file     Gets together: Not on file     Attends Jew service: Not on file     Active member of club or " organization: Not on file     Attends meetings of clubs or organizations: Not on file     Relationship status: Not on file     Intimate partner violence:     Fear of current or ex partner: Not on file     Emotionally abused: Not on file     Physically abused: Not on file     Forced sexual activity: Not on file   Other Topics Concern     Not on file   Social History Narrative     Not on file      Current Outpatient Medications   Medication Sig Dispense Refill     albuterol (PROVENTIL) 2.5 mg /3 mL (0.083 %) nebulizer solution NEBULIZE 1 VIAL BY MOUTH EVERY 4 HOURS AS NEEDED FOR WHEEZING. 180 mL 0     albuterol (VENTOLIN HFA) 90 mcg/actuation inhaler Inhale 2 puffs every 4 (four) hours as needed for wheezing. 18 g 1     atorvastatin (LIPITOR) 40 MG tablet Take 1 tablet (40 mg total) by mouth daily. 90 tablet 3     budesonide-formoterol (SYMBICORT) 160-4.5 mcg/actuation inhaler Inhale 2 puffs 2 (two) times a day. 1 Inhaler 12     cetirizine (ZYRTEC) 10 MG tablet Take 1 tablet (10 mg total) by mouth daily. 90 tablet 1     fluticasone propionate (FLONASE) 50 mcg/actuation nasal spray 2 sprays into each nostril daily. 10 g 11     HYDROcodone-acetaminophen (NORCO) 7.5-325 mg per tablet Take 1 tablet by mouth 3 (three) times a day as needed for pain. 90 tablet 0     hydrOXYzine HCl (ATARAX) 25 MG tablet Take 1 tablet (25 mg total) by mouth every 8 (eight) hours as needed for itching. 90 tablet 3     losartan-hydrochlorothiazide (HYZAAR) 100-25 mg per tablet Take 1 tablet by mouth daily. 90 tablet 3     metoprolol succinate (TOPROL-XL) 100 MG 24 hr tablet Take 1 tablet (100 mg total) by mouth daily 90 tablet 2     triamcinolone (KENALOG) 0.1 % cream APPLY TOPICALLY TO RASH TWO TIMES A DAY FOR 2 TO 3 WEEKS. 80 g 0     venlafaxine (EFFEXOR-XR) 150 MG 24 hr capsule Take 1 capsule (150 mg total) by mouth daily. 90 capsule 1     No current facility-administered medications for this visit.       Objective:   Visit Vitals  BP  "138/88   Pulse 88   Temp 98.5  F (36.9  C) (Oral)   Ht 5' 6.25\" (1.683 m)   Wt (!) 268 lb (121.6 kg)   LMP 06/08/2013   BMI 42.93 kg/m       VisionScreening:   Hearing Screening    Method: Audiometry    125Hz 250Hz 500Hz 1000Hz 2000Hz 3000Hz 4000Hz 6000Hz 8000Hz   Right ear:            Left ear:               Visual Acuity Screening    Right eye Left eye Both eyes   Without correction: 10/12.5 10/20 10/10   With correction:           PHYSICAL EXAM  GENERAL: alert, not distressed  CHEST: clear, no rales, rhonchi, or wheezes  CARDIAC: regular without murmur, gallop, or rub  ABDOMEN: obese, soft, non tender, non distended, normal bowel sounds    Right Shoulder Exam:  Tenderness to palpation at acromion, ac joint, deltoid  Arc: painful  Hawkin's: positive  Neer's: positive  Push off: positive  Empty can: positive  Cross arm impingement: positive  Sulcus: negative  Apprehension:  negative    Distal capillary refill, pulses, and motor and sensory function intact and normal.    Assessment Results 11/12/2019   Activities of Daily Living 2-4 - Moderate impairment   Instrumental Activities of Daily Living 2-4 - Moderate impairment   Get Up and Go Score Less than 12 seconds   Mini Cog Total Score 2   Some recent data might be hidden     A Mini-Cog score of 0-2 suggests the possibility of dementia, score of 3-5 suggests no dementia    Identified Health Risks:     The patient was provided with suggestions to help her develop a healthy physical lifestyle.     The patient reports that she drinks more than one alcoholic drink per day but denies binge or excessive drinking. She was counseled and given information about possible harmful effects of excessive alcohol intake.Alcohol may affect her sleep problems.    The patient reports that she does not have all recommended working emergency equipment available. She was provided with information about emergency preparedness, including smoke detectors.    The patient reports that she has " difficulty with activities of daily living. I have asked that the patient make a follow up appointment in 4 weeks where this issue will be further evaluated and addressed.    The patient reports that she has difficulty with instrumental activities of daily living.  She was provided with a list of local organizations that provide support services and advised to make a follow up appointment in 4 weeks to address this further.     The patient was provided with written information regarding signs of hearing loss.    The patient was provided with suggestions to help her develop a healthy emotional lifestyle.     The patient's PHQ-9 score is consistent with severe depression.  She was provided with information regarding depression and suicide prevention and was advised to schedule a follow up appointment in 4 weeks to further address this issue.    She is at risk for falling and has been provided with information to reduce the risk of falling at home.    Information regarding advance directives (living ch), including where she can download the appropriate form, was provided to the patient via the AVS.     The patient was provided with appropriate referrals to address her memory problem.

## 2021-06-03 NOTE — TELEPHONE ENCOUNTER
Controlled Substance Refill Request  Medication:   Requested Prescriptions     Pending Prescriptions Disp Refills     HYDROcodone-acetaminophen (NORCO) 7.5-325 mg per tablet [Pharmacy Med Name: HYDROcodone-Acetaminophen Oral Tablet 7.5-325 MG] 90 tablet 0     Sig: TAKE ONE TABLET BY MOUTH THREE TIMES DAILY     Signed Prescriptions Disp Refills     cetirizine (ZYRTEC) 10 MG tablet 90 tablet 3     Sig: Take 1 tablet (10 mg total) by mouth daily.     Authorizing Provider: AJ BATISTA     Ordering User: KEYLA PHAM     Date Last Fill: 10/30/2019  [facility]  Submit electronically to pharmacy  Controlled Substance Agreement on File:   Encounter-Level CSA Scan Date:    There are no encounter-level csa scan date.       Last office visit: 5/28/2019 Aj Batista MD        Refill Approved    Rx renewed per Medication Renewal Policy. Medication was last renewed on 5/28/2019.    Keyla Pham, [facility] Triage/Med Refill 11/27/2019     Requested Prescriptions   Pending Prescriptions Disp Refills     cetirizine (ZYRTEC) 10 MG tablet [Pharmacy Med Name: Cetirizine HCl Oral Tablet 10 MG] 90 tablet 0     Sig: Take 1 tablet (10 mg total) by mouth daily.       Antihistamine Refill Protocol Passed - 11/25/2019  2:34 PM        Passed - Patient has had office visit/physical in last year     Last office visit with prescriber/PCP: 5/28/2019 Aj Batista MD OR same dept: 5/28/2019 Aj Batista MD OR same specialty: 5/28/2019 Aj Batista MD  Last physical: 11/12/2019 Last MTM visit: Visit date not found   Next visit within 3 mo: Visit date not found  Next physical within 3 mo: Visit date not found  Prescriber OR PCP: Aj Batista MD  Last diagnosis associated with med order: 1. Allergic rhinitis  - cetirizine (ZYRTEC) 10 MG tablet [Pharmacy Med Name: Cetirizine HCl Oral Tablet 10 MG]; Take 1 tablet (10 mg total) by mouth daily.  Dispense: 90 tablet; Refill: 0    2. OA (osteoarthritis) of knee  -  HYDROcodone-acetaminophen (NORCO) 7.5-325 mg per tablet [Pharmacy Med Name: HYDROcodone-Acetaminophen Oral Tablet 7.5-325 MG]; TAKE ONE TABLET BY MOUTH THREE TIMES DAILY   Dispense: 90 tablet; Refill: 0    3. Osteoarthrosis involving lower leg  - HYDROcodone-acetaminophen (NORCO) 7.5-325 mg per tablet [Pharmacy Med Name: HYDROcodone-Acetaminophen Oral Tablet 7.5-325 MG]; TAKE ONE TABLET BY MOUTH THREE TIMES DAILY   Dispense: 90 tablet; Refill: 0    If protocol passes may refill for 12 months if within 3 months of last provider visit (or a total of 15 months).             HYDROcodone-acetaminophen (NORCO) 7.5-325 mg per tablet [Pharmacy Med Name: HYDROcodone-Acetaminophen Oral Tablet 7.5-325 MG] 90 tablet 0     Sig: TAKE ONE TABLET BY MOUTH THREE TIMES DAILY       Controlled Substances Refill Protocol Failed - 11/25/2019  2:34 PM        Failed - Route all Controlled Substance Requests to Provider        Passed - Patient has controlled substance agreement in past 12 months     Encounter-Level CSA Scan Date:    There are no encounter-level csa scan date.               Passed - Visit with PCP or prescribing provider visit in past 12 months      Last office visit with prescriber/PCP: 5/28/2019 Aj Conrad MD OR same dept: 5/28/2019 Aj Conrad MD OR same specialty: 5/28/2019 Aj Conrad MD Last physical: 11/12/2019 Last MTM visit: Visit date not found    Next visit within 3 mo: Visit date not found  Next physical within 3 mo: Visit date not found  Prescriber OR PCP: Aj Conrad MD  Last diagnosis associated with med order: 1. Allergic rhinitis  - cetirizine (ZYRTEC) 10 MG tablet [Pharmacy Med Name: Cetirizine HCl Oral Tablet 10 MG]; Take 1 tablet (10 mg total) by mouth daily.  Dispense: 90 tablet; Refill: 0    2. OA (osteoarthritis) of knee  - HYDROcodone-acetaminophen (NORCO) 7.5-325 mg per tablet [Pharmacy Med Name: HYDROcodone-Acetaminophen Oral Tablet 7.5-325 MG]; TAKE ONE TABLET BY MOUTH THREE  TIMES DAILY   Dispense: 90 tablet; Refill: 0    3. Osteoarthrosis involving lower leg  - HYDROcodone-acetaminophen (NORCO) 7.5-325 mg per tablet [Pharmacy Med Name: HYDROcodone-Acetaminophen Oral Tablet 7.5-325 MG]; TAKE ONE TABLET BY MOUTH THREE TIMES DAILY   Dispense: 90 tablet; Refill: 0

## 2021-06-03 NOTE — TELEPHONE ENCOUNTER
Patient Returning Call  Reason for call:  Return call  Information relayed to patient:  Patient was informed of Aj Conrad MD's message below about her lab results.  Patient has additional questions:  No  If YES, what are your questions/concerns:  n/a  Okay to leave a detailed message?: No call back needed

## 2021-06-03 NOTE — PROGRESS NOTES
Right Shoulder Injection  Date/Time: 11/12/2019 2:20 PM  Performed by: Aj Conrad MD  Authorized by: Aj Conrad MD       Universal Protocol    Site marked: Yes    Prior images obtained and reviewed: NA    Required items: required blood products, implants, devices, and special equipment available    Patient identity confirmed: verbally with patient    Reevaluation: NA - No sedation, light sedation, or local anesthesia    Confirmation checklist: patient's identity using two indicators    Time out: Immediately prior to procedure a time out was called to verify the correct patient, procedure, equipment, support staff and site/side marked as required    Universal Protocol: Joint Commission Universal Protocol was followed    Preparation: Patient was prepped and draped in the usual sterile fashion    Indications  Indications: pain     Location  Body area: shoulder  Joint: right shoulder      Anesthesia  Local anesthesia used?: No    Sedation  Patient sedation: No    Procedure Details  Needle size: 22 G  Ultrasound guidance: no  Approach: posterior  Aspirate amount: 0 mL  Methylprednisolone amount: 80 mg  Lidocaine 1% amount: 4 mL      Post-procedure    Patient tolerance: Patient tolerated the procedure well with no immediate complications   Length of time physician present for 1:1 monitoring during sedation: 0

## 2021-06-04 VITALS
WEIGHT: 265 LBS | TEMPERATURE: 98.5 F | HEART RATE: 83 BPM | BODY MASS INDEX: 42.45 KG/M2 | DIASTOLIC BLOOD PRESSURE: 83 MMHG | SYSTOLIC BLOOD PRESSURE: 124 MMHG | RESPIRATION RATE: 14 BRPM

## 2021-06-04 NOTE — TELEPHONE ENCOUNTER
Called patient about Norco being approve. Patient was wondering about her Blood pressure med. Are you willing to fill? losartan-hydrochlorothiazide is on recall are you willing to send separate rx? Or NTBS?. Please advise.

## 2021-06-04 NOTE — TELEPHONE ENCOUNTER
Refill Approved    Rx renewed per Medication Renewal Policy. Medication was last renewed on 5/28/19.    Leann Nguyen, [facility] Triage/Med Refill 1/1/2020     Requested Prescriptions   Pending Prescriptions Disp Refills     hydrOXYzine HCl (ATARAX) 25 MG tablet [Pharmacy Med Name: hydrOXYzine HCl Oral Tablet 25 MG] 90 tablet 2     Sig: Take 1 tablet (25 mg total) by mouth every 8 (eight) hours as needed for itching.       Antihistamine Refill Protocol Passed - 12/30/2019 12:36 PM        Passed - Patient has had office visit/physical in last year     Last office visit with prescriber/PCP: 5/28/2019 Aj Conrad MD OR same dept: 5/28/2019 Aj Conrad MD OR same specialty: 5/28/2019 Aj Conrad MD  Last physical: 11/12/2019 Last MTM visit: Visit date not found   Next visit within 3 mo: Visit date not found  Next physical within 3 mo: Visit date not found  Prescriber OR PCP: Aj Conrad MD  Last diagnosis associated with med order: 1. Allergic rhinitis  - hydrOXYzine HCl (ATARAX) 25 MG tablet [Pharmacy Med Name: hydrOXYzine HCl Oral Tablet 25 MG]; Take 1 tablet (25 mg total) by mouth every 8 (eight) hours as needed for itching.  Dispense: 90 tablet; Refill: 2    2. FOX (generalized anxiety disorder)  - hydrOXYzine HCl (ATARAX) 25 MG tablet [Pharmacy Med Name: hydrOXYzine HCl Oral Tablet 25 MG]; Take 1 tablet (25 mg total) by mouth every 8 (eight) hours as needed for itching.  Dispense: 90 tablet; Refill: 2    3. Moderate single current episode of major depressive disorder (H)  - venlafaxine (EFFEXOR-XR) 150 MG 24 hr capsule [Pharmacy Med Name: Venlafaxine HCl ER Oral Capsule Extended Release 24 Hour 150 MG]; Take 1 capsule (150 mg total) by mouth daily.  Dispense: 90 capsule; Refill: 0    If protocol passes may refill for 12 months if within 3 months of last provider visit (or a total of 15 months).             venlafaxine (EFFEXOR-XR) 150 MG 24 hr capsule [Pharmacy Med Name: Venlafaxine HCl ER  Oral Capsule Extended Release 24 Hour 150 MG] 90 capsule 0     Sig: Take 1 capsule (150 mg total) by mouth daily.       Venlafaxine/Desvenlafaxine Refill Protocol Failed - 12/30/2019 12:36 PM        Failed - Fasting lipid cascade in last year     Cholesterol   Date Value Ref Range Status   07/19/2018 216 (H) <=199 mg/dL Final     Triglycerides   Date Value Ref Range Status   07/19/2018 130 <=149 mg/dL Final     HDL Cholesterol   Date Value Ref Range Status   07/19/2018 45 (L) >=50 mg/dL Final     LDL Calculated   Date Value Ref Range Status   07/19/2018 145 (H) <=129 mg/dL Final     Patient Fasting > 8hrs?   Date Value Ref Range Status   07/19/2018 Unknown  Final             Passed - LFT or AST or ALT in last year     Albumin   Date Value Ref Range Status   11/12/2019 3.6 3.5 - 5.0 g/dL Final     Bilirubin, Total   Date Value Ref Range Status   11/12/2019 0.5 0.0 - 1.0 mg/dL Final     Alkaline Phosphatase   Date Value Ref Range Status   11/12/2019 110 45 - 120 U/L Final     AST   Date Value Ref Range Status   11/12/2019 23 0 - 40 U/L Final     ALT   Date Value Ref Range Status   11/12/2019 17 0 - 45 U/L Final     Protein, Total   Date Value Ref Range Status   11/12/2019 6.9 6.0 - 8.0 g/dL Final                Passed - PCP or prescribing provider visit in last year     Last office visit with prescriber/PCP: 5/28/2019 Aj Conrad MD OR same dept: 5/28/2019 Aj Conrad MD OR same specialty: 5/28/2019 Aj Conrad MD  Last physical: 11/12/2019 Last MTM visit: Visit date not found   Next visit within 3 mo: Visit date not found  Next physical within 3 mo: Visit date not found  Prescriber OR PCP: Aj Conrad MD  Last diagnosis associated with med order: 1. Allergic rhinitis  - hydrOXYzine HCl (ATARAX) 25 MG tablet [Pharmacy Med Name: hydrOXYzine HCl Oral Tablet 25 MG]; Take 1 tablet (25 mg total) by mouth every 8 (eight) hours as needed for itching.  Dispense: 90 tablet; Refill: 2    2. FOX (generalized  anxiety disorder)  - hydrOXYzine HCl (ATARAX) 25 MG tablet [Pharmacy Med Name: hydrOXYzine HCl Oral Tablet 25 MG]; Take 1 tablet (25 mg total) by mouth every 8 (eight) hours as needed for itching.  Dispense: 90 tablet; Refill: 2    3. Moderate single current episode of major depressive disorder (H)  - venlafaxine (EFFEXOR-XR) 150 MG 24 hr capsule [Pharmacy Med Name: Venlafaxine HCl ER Oral Capsule Extended Release 24 Hour 150 MG]; Take 1 capsule (150 mg total) by mouth daily.  Dispense: 90 capsule; Refill: 0    If protocol passes may refill for 12 months if within 3 months of last provider visit (or a total of 15 months).             Passed - Blood Pressure in last year     BP Readings from Last 1 Encounters:   11/12/19 138/88

## 2021-06-04 NOTE — TELEPHONE ENCOUNTER
Patient requested this through her pharmacy last week, but it was never received by the clinic. Please have covering provider address.        Controlled Substance Refill Request  Medication Name:   Requested Prescriptions     Pending Prescriptions Disp Refills     HYDROcodone-acetaminophen (NORCO) 7.5-325 mg per tablet 90 tablet 0     Sig: Take 1 tablet by mouth 3 (three) times a day.     Date Last Fill: 11/29/2019  Pharmacy: [facility] #[id number]      Submit electronically to pharmacy  Controlled Substance Agreement Date Scanned:   Encounter-Level CSA Scan Date:    There are no encounter-level csa scan date.       Last office visit with prescriber/PCP: 5/28/2019 Aj Conrad MD OR same dept: 5/28/2019 Aj Conrad MD OR same specialty: 5/28/2019 Aj Conrad MD  Last physical: 11/12/2019 Last MTM visit: Visit date not found

## 2021-06-04 NOTE — TELEPHONE ENCOUNTER
RN cannot approve Refill Request    RN can NOT refill this medication Protocol failed and NO refill given.         Leann Nguyen, [facility] Triage/Med Refill 1/1/2020    Requested Prescriptions   Pending Prescriptions Disp Refills     venlafaxine (EFFEXOR-XR) 150 MG 24 hr capsule [Pharmacy Med Name: Venlafaxine HCl ER Oral Capsule Extended Release 24 Hour 150 MG] 90 capsule 3     Sig: Take 1 capsule (150 mg total) by mouth daily.       Venlafaxine/Desvenlafaxine Refill Protocol Failed - 12/30/2019 12:36 PM        Failed - Fasting lipid cascade in last year     Cholesterol   Date Value Ref Range Status   07/19/2018 216 (H) <=199 mg/dL Final     Triglycerides   Date Value Ref Range Status   07/19/2018 130 <=149 mg/dL Final     HDL Cholesterol   Date Value Ref Range Status   07/19/2018 45 (L) >=50 mg/dL Final     LDL Calculated   Date Value Ref Range Status   07/19/2018 145 (H) <=129 mg/dL Final     Patient Fasting > 8hrs?   Date Value Ref Range Status   07/19/2018 Unknown  Final             Passed - LFT or AST or ALT in last year     Albumin   Date Value Ref Range Status   11/12/2019 3.6 3.5 - 5.0 g/dL Final     Bilirubin, Total   Date Value Ref Range Status   11/12/2019 0.5 0.0 - 1.0 mg/dL Final     Alkaline Phosphatase   Date Value Ref Range Status   11/12/2019 110 45 - 120 U/L Final     AST   Date Value Ref Range Status   11/12/2019 23 0 - 40 U/L Final     ALT   Date Value Ref Range Status   11/12/2019 17 0 - 45 U/L Final     Protein, Total   Date Value Ref Range Status   11/12/2019 6.9 6.0 - 8.0 g/dL Final                Passed - PCP or prescribing provider visit in last year     Last office visit with prescriber/PCP: 5/28/2019 Aj Conrad MD OR same dept: 5/28/2019 Aj Conrad MD OR same specialty: 5/28/2019 Aj Conard MD  Last physical: 11/12/2019 Last MTM visit: Visit date not found   Next visit within 3 mo: Visit date not found  Next physical within 3 mo: Visit date not found  Prescriber  OR PCP: Aj Conrad MD  Last diagnosis associated with med order: 1. Allergic rhinitis  - hydrOXYzine HCl (ATARAX) 25 MG tablet; Take 1 tablet (25 mg total) by mouth every 8 (eight) hours as needed for itching.  Dispense: 90 tablet; Refill: 6    2. FOX (generalized anxiety disorder)  - hydrOXYzine HCl (ATARAX) 25 MG tablet; Take 1 tablet (25 mg total) by mouth every 8 (eight) hours as needed for itching.  Dispense: 90 tablet; Refill: 6    3. Moderate single current episode of major depressive disorder (H)  - venlafaxine (EFFEXOR-XR) 150 MG 24 hr capsule [Pharmacy Med Name: Venlafaxine HCl ER Oral Capsule Extended Release 24 Hour 150 MG]; Take 1 capsule (150 mg total) by mouth daily.  Dispense: 90 capsule; Refill: 0    If protocol passes may refill for 12 months if within 3 months of last provider visit (or a total of 15 months).             Passed - Blood Pressure in last year     BP Readings from Last 1 Encounters:   11/12/19 138/88           Signed Prescriptions Disp Refills    hydrOXYzine HCl (ATARAX) 25 MG tablet 90 tablet 6     Sig: Take 1 tablet (25 mg total) by mouth every 8 (eight) hours as needed for itching.       Antihistamine Refill Protocol Passed - 12/30/2019 12:36 PM        Passed - Patient has had office visit/physical in last year     Last office visit with prescriber/PCP: 5/28/2019 Aj Conrad MD OR same dept: 5/28/2019 Aj Conrad MD OR same specialty: 5/28/2019 Aj Conrad MD  Last physical: 11/12/2019 Last MTM visit: Visit date not found   Next visit within 3 mo: Visit date not found  Next physical within 3 mo: Visit date not found  Prescriber OR PCP: Aj Conrad MD  Last diagnosis associated with med order: 1. Allergic rhinitis  - hydrOXYzine HCl (ATARAX) 25 MG tablet; Take 1 tablet (25 mg total) by mouth every 8 (eight) hours as needed for itching.  Dispense: 90 tablet; Refill: 6    2. FOX (generalized anxiety disorder)  - hydrOXYzine HCl (ATARAX) 25 MG tablet; Take 1  tablet (25 mg total) by mouth every 8 (eight) hours as needed for itching.  Dispense: 90 tablet; Refill: 6    3. Moderate single current episode of major depressive disorder (H)  - venlafaxine (EFFEXOR-XR) 150 MG 24 hr capsule [Pharmacy Med Name: Venlafaxine HCl ER Oral Capsule Extended Release 24 Hour 150 MG]; Take 1 capsule (150 mg total) by mouth daily.  Dispense: 90 capsule; Refill: 0    If protocol passes may refill for 12 months if within 3 months of last provider visit (or a total of 15 months).

## 2021-06-04 NOTE — TELEPHONE ENCOUNTER
Called and spoke with Rebecca Smalls , Message was given, Rebecca Smalls  understood, no further questions.

## 2021-06-04 NOTE — TELEPHONE ENCOUNTER
Patient states she has been waiting for her pharmacy to request her venlafaxine for over 2 weeks and has been out of medication. She is aware that Aj Conrad MD is out of the clinic and asks that this be addressed by the covering provider.

## 2021-06-05 VITALS
DIASTOLIC BLOOD PRESSURE: 100 MMHG | WEIGHT: 260.75 LBS | HEIGHT: 66 IN | BODY MASS INDEX: 41.9 KG/M2 | HEART RATE: 82 BPM | SYSTOLIC BLOOD PRESSURE: 162 MMHG

## 2021-06-05 NOTE — TELEPHONE ENCOUNTER
Who is calling:  Patient  Reason for Call:  What is status of refill of pain medication?  Date of last appointment with primary care: 11/12/19  Okay to leave a detailed message: Yes

## 2021-06-05 NOTE — TELEPHONE ENCOUNTER
----- Message from Otis Rey CMA sent at 1/22/2020  3:29 PM CST -----      ----- Message -----  From: Aj Conrad MD  Sent: 1/22/2020   2:50 PM CST  To: [facility]/Ob Support Pool    Please call to remind patient that colon cancer screening is due.  Could schedule colonoscopy or do stool tests.

## 2021-06-05 NOTE — TELEPHONE ENCOUNTER
Medication Question or Clarification  Who is calling: Pharmacy  What medication are you calling about (include dose and sig)?:   triamcinolone (KENALOG) 0.1 % cream 80 g 0 1/13/2020  No   Sig: APPLY TOPICALLY TO RASH TWO TIMES A DAY FOR 2 TO 3 WEEKS.     Who prescribed the medication?: Aj Conrad MD   What is your question/concern?: Please provide application site  [facility]ed [facility]  Okay to leave a detailed message?: Yes

## 2021-06-05 NOTE — TELEPHONE ENCOUNTER
Called the pt and asked her where is the site that she uses the Triamcinolone on. Per pt uses it on her legs. I called and spoke to pharmacist Osman and informed him of the site.

## 2021-06-05 NOTE — TELEPHONE ENCOUNTER
Controlled Substance Refill Request  Medication Name:   Requested Prescriptions     Pending Prescriptions Disp Refills     HYDROcodone-acetaminophen (NORCO) 7.5-325 mg per tablet [Pharmacy Med Name: HYDROcodone-Acetaminophen Oral Tablet 7.5-325 MG] 90 tablet 0     Sig: TAKE ONE TABLET BY MOUTH THREE TIMES DAILY     Date Last Fill:   HYDROcodone-acetaminophen (NORCO) 7.5-325 mg per tablet 90 tablet 0 1/3/2020  No   Sig - Route: Take 1 tablet by mouth 3 (three) times a day. - Oral   Sent to pharmacy as: HYDROcodone 7.5 mg-acetaminophen 325 mg tablet (NORCO)   Earliest Fill Date: 1/3/2020   E-Prescribing Status: Receipt confirmed by pharmacy (1/3/2020  2:11 PM CST)   [id number]  Submit electronically to pharmacy  Controlled Substance Agreement on file:   Encounter-Level CSA Scan Date:    There are no encounter-level csa scan date.        Last office visit:  11/12/19

## 2021-06-05 NOTE — TELEPHONE ENCOUNTER
Patient Returning Call  Reason for call:  Patient returning call to check on the status of this request.  Information relayed to patient:  Pending providers review and approval.  Patient has additional questions:  yes  If YES, what are your questions/concerns: Please let them know I need my pain medication ASAP I am in a lot of pain.   Okay to leave a detailed message?: Yes      Forwarded this message to care team support as Aj Conrad MD is not scheduled with patients this afternoon or the rest of the week.

## 2021-06-05 NOTE — TELEPHONE ENCOUNTER
RN cannot approve Refill Request    RN can NOT refill this medication med is not covered by policy/route to provider. Last office visit: 5/28/2019 Aj Conrad MD Last Physical: 11/12/2019 Last MTM visit: Visit date not found Last visit same specialty: 5/28/2019 Aj Conrad MD.  Next visit within 3 mo: Visit date not found  Next physical within 3 mo: Visit date not found      Reyna Pierre, [facility] Triage/Med Refill 1/11/2020    Requested Prescriptions   Pending Prescriptions Disp Refills     triamcinolone (KENALOG) 0.1 % cream [Pharmacy Med Name: Triamcinolone Acetonide External Cream 0.1 %] 80 g 0     Sig: APPLY TOPICALLY TO RASH TWO TIMES A DAY FOR 2 TO 3 WEEKS.       There is no refill protocol information for this order

## 2021-06-06 NOTE — TELEPHONE ENCOUNTER
"Called and left message for pt to return call.# 1  \" Okay to relay message\"    Upon return call, please let patient know med has been refilled.  "

## 2021-06-06 NOTE — TELEPHONE ENCOUNTER
Controlled Substance Refill Request  Medication Name:   Requested Prescriptions     Pending Prescriptions Disp Refills     HYDROcodone-acetaminophen (NORCO) 7.5-325 mg per tablet 90 tablet 0     Sig: Take 1 tablet by mouth 3 (three) times a day.     Date Last Fill: 2/2/20  [id number]  Submit electronically to pharmacy  Controlled Substance Agreement on file:   Encounter-Level CSA Scan Date:    There are no encounter-level csa scan date.        Last office visit:  11/14/19

## 2021-06-07 NOTE — TELEPHONE ENCOUNTER
Pt is calling about sinus irritation. Reports runny nose.   States she gets this every year. Pain rated 8/10 for pain & is constant and disrupting her sleep.     Has congestion at night. Has L earache. Has tried nyquil, vicks and had no symptom relief.    Denies difficulty breathing.  Denies sore throat.     Care advice reviewed. Pt to be scheduled for telephone visit. Advised to call back if symptoms worsen.    Reason for Disposition    Earache    Protocols used: SINUS PAIN AND CONGESTION-A-MARLEN Anguiano RN

## 2021-06-07 NOTE — PROGRESS NOTES
"Rebecca Smalls is a 54 y.o. female who is being evaluated via a billable telephone visit.      The patient has been notified of following:     \"This telephone visit will be conducted via a call between you and your physician/provider. We have found that certain health care needs can be provided without the need for a physical exam.  This service lets us provide the care you need with a short phone conversation.  If a prescription is necessary we can send it directly to your pharmacy.  If lab work is needed we can place an order for that and you can then stop by our lab to have the test done at a later time.    Telephone visits are billed at different rates depending on your insurance coverage. During this emergency period, for some insurers they may be billed the same as an in-person visit.  Please reach out to your insurance provider with any questions.    If during the course of the call the physician/provider feels a telephone visit is not appropriate, you will not be charged for this service.\"    Patient has given verbal consent to a Telephone visit? Yes    Patient would like to receive their AVS by AVS Preference: Mail a copy.    Additional provider notes:    ASSESSMENT/PLAN:  1. Acute non-recurrent maxillary sinusitis  amoxicillin-clavulanate (AUGMENTIN) 875-125 mg per tablet   2. Allergic rhinitis  fluticasone propionate (FLONASE) 50 mcg/actuation nasal spray    cetirizine (ZYRTEC) 10 MG tablet       This is a 53 yo female with known underlying allergies, now with increasing pain across nose, face, ear - symptoms consistent with acute sinusitis.  She has failed conservative therapy (including an NSAID) for 1 week.  Will add antibiotic therapy :  (generic) Augmentin.  Also will refill her Cetirizine and Fluticasone nasal spray - encourage regular use of these as well during this spring allergy season.    Return in about 4 weeks (around 5/29/2020) for if not getting better.      Medications Discontinued During " "This Encounter   Medication Reason     fluticasone propionate (FLONASE) 50 mcg/actuation nasal spray Reorder     cetirizine (ZYRTEC) 10 MG tablet Reorder     There are no Patient Instructions on file for this visit.    Chief Complaint:  Chief Complaint   Patient presents with     possible sinus infection       HPI:   Rebecca Smalls is a 54 y.o. female c/o  \"this sinus is killing me\"   X 1 week  Nose is stuffed  Hurts real bad across face  Hard to breathe at night  Left ear is painful  Can't sleep  Has had trouble with sinuses in past  No fever  Hasn't been out and about - denies any exposures - jesse no one with COVID-19  Everyone else is doing fine  Taking ibuprofen, Halls, nasal spray  Nothing seems to be helping  Does get hay fever  But this is \"worse\" in the head    Not allergic to antibiotics -     PMH:   Patient Active Problem List    Diagnosis Date Noted     Chronic, continuous use of opioids 05/29/2019     Morbid obesity (H) 05/28/2019     Type 2 diabetes mellitus without complication 07/19/2018     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     Shoulder impingement syndrome, left 09/12/2017     Alcoholism (H) 01/11/2015     FOX (generalized anxiety disorder) 01/11/2015     OCD (obsessive compulsive disorder) 01/11/2015     Panic disorder without agoraphobia 01/11/2015     Severe recurrent major depression (H) 01/11/2015     Post traumatic stress disorder (PTSD) 01/11/2015     Insomnia 01/11/2015     Tendonitis Supraspinatus      Biceps tendinitis of left upper extremity      Allergies      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      Obstructive Sleep Apnea      Hypercholesterolemia      Essential Hypertension      Mild persistent asthma without complication      Osteoarthritis Of The Knee      Past Medical History:   Diagnosis Date     Alcoholism (H)      Anxiety      Asthma     Mild Persistent without complication     Diabetes mellitus (H)      Hypercholesteremia      Hypertension      Obesity  "     Obsessive compulsive disorder      Os acromiale of left shoulder      TITA (obstructive sleep apnea)      Osteoarthritis     Knee     Panic disorder without agoraphobia      Peroneal nerve palsy     Left Leg     PTSD (post-traumatic stress disorder)      Severe recurrent major depression (H)      Supraspinatus tendonitis      Past Surgical History:   Procedure Laterality Date     APPENDECTOMY       JOINT REPLACEMENT      knee     OOPHORECTOMY Right 1990     VA LIGATE FALLOPIAN TUBE      Description: Tubal Ligation;  Recorded: 05/24/2011;     Social History     Socioeconomic History     Marital status:      Spouse name: Not on file     Number of children: Not on file     Years of education: Not on file     Highest education level: Not on file   Occupational History     Not on file   Social Needs     Financial resource strain: Not on file     Food insecurity     Worry: Not on file     Inability: Not on file     Transportation needs     Medical: Not on file     Non-medical: Not on file   Tobacco Use     Smoking status: Never Smoker     Smokeless tobacco: Never Used     Tobacco comment: no second hand smoke exposure   Substance and Sexual Activity     Alcohol use: Yes     Comment: rare     Drug use: No     Sexual activity: Not on file   Lifestyle     Physical activity     Days per week: Not on file     Minutes per session: Not on file     Stress: Not on file   Relationships     Social connections     Talks on phone: Not on file     Gets together: Not on file     Attends Judaism service: Not on file     Active member of club or organization: Not on file     Attends meetings of clubs or organizations: Not on file     Relationship status: Not on file     Intimate partner violence     Fear of current or ex partner: Not on file     Emotionally abused: Not on file     Physically abused: Not on file     Forced sexual activity: Not on file   Other Topics Concern     Not on file   Social History Narrative     Not on  "file     Family History   Problem Relation Age of Onset     Asthma Son      Diabetes Brother      HIV Nephew         AIDS- nephew (sister's son)  ate 20s; contracted from his wife, had twins-daughter  of AIDS age 8; son o     Liver cancer Sister          age 50; 1997     Breast cancer Sister 50         2000     Heart disease Sister         enlarged heart     Heart disease Brother         enlarged heart     Asthma Other         niece and grandson     Cancer Maternal Aunt         \"went thru whole body..not sure where it started\",  in 30s       Meds:    Current Outpatient Medications:      albuterol (PROVENTIL) 2.5 mg /3 mL (0.083 %) nebulizer solution, NEBULIZE 1 VIAL BY MOUTH EVERY 4 HOURS AS NEEDED FOR WHEEZING., Disp: 180 mL, Rfl: 0     albuterol (VENTOLIN HFA) 90 mcg/actuation inhaler, Inhale 2 puffs every 4 (four) hours as needed for wheezing., Disp: 18 g, Rfl: 1     atorvastatin (LIPITOR) 40 MG tablet, Take 1 tablet (40 mg total) by mouth daily., Disp: 90 tablet, Rfl: 3     budesonide-formoterol (SYMBICORT) 160-4.5 mcg/actuation inhaler, Inhale 2 puffs 2 (two) times a day., Disp: 1 Inhaler, Rfl: 12     cetirizine (ZYRTEC) 10 MG tablet, Take 1 tablet (10 mg total) by mouth daily., Disp: 90 tablet, Rfl: 1     fluticasone propionate (FLONASE) 50 mcg/actuation nasal spray, 2 sprays into each nostril daily., Disp: 10 g, Rfl: 3     hydroCHLOROthiazide (HYDRODIURIL) 25 MG tablet, Take 1 tablet (25 mg total) by mouth daily., Disp: 90 tablet, Rfl: 3     HYDROcodone-acetaminophen (NORCO) 7.5-325 mg per tablet, TAKE ONE TABLET BY MOUTH THREE TIMES DAILY , Disp: 90 tablet, Rfl: 0     hydrOXYzine HCl (ATARAX) 25 MG tablet, Take 1 tablet (25 mg total) by mouth every 8 (eight) hours as needed for itching., Disp: 90 tablet, Rfl: 6     losartan (COZAAR) 100 MG tablet, Take 1 tablet (100 mg total) by mouth daily., Disp: 90 tablet, Rfl: 3     metoprolol succinate (TOPROL-XL) 100 MG 24 hr tablet, Take 1 " tablet (100 mg total) by mouth daily, Disp: 90 tablet, Rfl: 2     triamcinolone (KENALOG) 0.1 % cream, APPLY TOPICALLY TO RASH ON BOTH LEGS TWO TIMES A DAY FOR 2 TO 3 WEEKS., Disp: 80 g, Rfl: 0     venlafaxine (EFFEXOR-XR) 150 MG 24 hr capsule, Take 1 capsule (150 mg total) by mouth daily., Disp: 90 capsule, Rfl: 0     amoxicillin-clavulanate (AUGMENTIN) 875-125 mg per tablet, Take 1 tablet by mouth 2 (two) times a day for 10 days., Disp: 20 tablet, Rfl: 0    Allergies:  No Known Allergies    ROS:  Pertinent positives as noted in HPI; otherwise 12 point ROS negative.      Physical Exam:  EXAM:  LMP 06/08/2013      This is a telephone visit      Results:  This is a telephone visit    Phone call duration: 7 minutes  [phone]

## 2021-06-07 NOTE — TELEPHONE ENCOUNTER
Requested Prescriptions     Pending Prescriptions Disp Refills     triamcinolone (KENALOG) 0.1 % cream [Pharmacy Med Name: Triamcinolone Acetonide External Cream 0.1 %] 80 g 0     Sig: APPLY TOPICALLY TO RASH ON BOTH LEGS TWO TIMES A DAY FOR 2 TO 3 WEEKS.     HYDROcodone-acetaminophen (NORCO) 7.5-325 mg per tablet [Pharmacy Med Name: HYDROcodone-Acetaminophen Oral Tablet 7.5-325 MG] 90 tablet 0     Sig: TAKE ONE TABLET BY MOUTH THREE TIMES DAILY       Roula Michaud

## 2021-06-07 NOTE — TELEPHONE ENCOUNTER
"Called and left a detail message. There is no KATHRYN form sign unable to fax medication list. Left information on how to obtain a release information form.   \" Okay to relay message\"     "

## 2021-06-07 NOTE — TELEPHONE ENCOUNTER
Who is calling:  Patient  Reason for Call:    Patient is requesting her Medication List be faxed to her PCA Nurse Elif at: [phone].  Thank You.  Date of last appointment with primary care: 5/1/2020, virtual visit  Okay to leave a detailed message: Yes

## 2021-06-08 NOTE — PROGRESS NOTES
Pt came in for a Blood pressure check. Vitals updated. 1st BP: 152/96 P:88, 2nd BP: 134/88 P:84. Went over Blood pressures with tanner Turpin Dr. to discharge pt. Pt had no further questions.

## 2021-06-09 NOTE — TELEPHONE ENCOUNTER
The instructions are in the kit and there is also a 24 hour phopne number to assist with any questions

## 2021-06-09 NOTE — TELEPHONE ENCOUNTER
Question following Office Visit  When did you see your provider: 6/22/20  What is your question: Patient was to be given she believes the Cologuard kit for screening, should she come and pick one up ,or will it be mail to patient's address.  Please advise.  Okay to leave a detailed message: Yes

## 2021-06-09 NOTE — PROGRESS NOTES
ASSESSMENT/PLAN:  1. Essential hypertension     2. Moderate single current episode of major depressive disorder (H)  venlafaxine (EFFEXOR-XR) 150 MG 24 hr capsule   3. Obstructive Sleep Apnea     4. Hypercholesterolemia  Lipid Cascade FASTING   5. Mild persistent asthma without complication     6. FOX (generalized anxiety disorder)     7. Type 2 diabetes mellitus without complication, without long-term current use of insulin (H)  Glycosylated Hemoglobin A1c    Comprehensive Metabolic Panel   8. Morbid obesity (H)     9. Encounter for screening mammogram for breast cancer  Mammo Screening Bilateral   10. Colon cancer screening  Coluard       This is a 55 yo female here for:  1.  Hypertension - blood pressure is controlled currently - will continue current treatment  2.  Moderate Major Depression   PHQ-9 Total Score: 24 (2020  8:30 AM)  Restart Venlafaxine 150 mg daily  3.  TITA -  Uses CPAP  4.  Hyperlipidemia - reviewed - check lipids  5.  Mild persistent asthma - struggling with breathing during hot humid spells -   ACT Total Score: (!) 15 (2020  2:00 PM)  6.  Generalized Anxiety - continue Venlafaxine  7.  Type II DM - has not paid a lot of attention to her diabetes - check labs and will make further recommendations  8.  Morbid Obesity - discussed  9.  Health Maintenance - due for breast cancer screening - mammogram ordered; due for colon cancer screening - agrees to [facility]    Return in about 3 months (around 2020) for Recheck, BP Check, Diabetes.      Medications Discontinued During This Encounter   Medication Reason     venlafaxine (EFFEXOR-XR) 150 MG 24 hr capsule Reorder     There are no Patient Instructions on file for this visit.    Chief Complaint:  Chief Complaint   Patient presents with     Diabetes     BP check     Rash     buttock       HPI:   Rebecca HAYDEN Slim is a 54 y.o. female c/o  Sister (age 64)   - complications of COVID-19 (had heart attack and couldn't bring her back);  " in [place]  Patient has not had known COVID-19 exposure   Just went to [place] - patient wore a mask - no one else did    Has lost 3 sisters with cancer; bro-in-law with cancer    Diabetes:  Since COVID - hasn't checked it  Doesn't eat pork; eats fish/chicken/poultry  Eats a lot of salads  Pork gives her a headache    Blood pressure - normal     Daughter had COVID-19 - lives in [place] - 3 months ago -   Was dizzy and got Meclizine -   Patient took one of those yesterday because she was dizzy -     Hydrocodone - uses \"every time I get pain\"  Dr. Kramer isn't doing any surgery right now  Takes 2-3 times/ day  Makes her itch a little bit  It lasts her a month     Doesn't have air conditioning        PMH:   Patient Active Problem List    Diagnosis Date Noted     Chronic, continuous use of opioids 2019     Morbid obesity (H) 2019     Type 2 diabetes mellitus without complication 2018     Glenoid chondromalacia of left shoulder 2017     Shoulder impingement syndrome, left 2017     Alcoholism (H) 2015     FOX (generalized anxiety disorder) 2015     OCD (obsessive compulsive disorder) 2015     Panic disorder without agoraphobia 2015     Severe recurrent major depression (H) 2015     Post traumatic stress disorder (PTSD) 2015     Insomnia 2015     Tendonitis Supraspinatus      Biceps tendinitis of left upper extremity      Allergies      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      Obstructive Sleep Apnea      Hypercholesterolemia      Essential Hypertension      Mild persistent asthma without complication      Osteoarthritis Of The Knee      Past Medical History:   Diagnosis Date     Alcoholism (H)      Anxiety      Asthma     Mild Persistent without complication     Diabetes mellitus (H)      Hypercholesteremia      Hypertension      Obesity      Obsessive compulsive disorder      Os acromiale of left shoulder      TITA " (obstructive sleep apnea)      Osteoarthritis     Knee     Panic disorder without agoraphobia      Peroneal nerve palsy     Left Leg     PTSD (post-traumatic stress disorder)      Severe recurrent major depression (H)      Supraspinatus tendonitis      Past Surgical History:   Procedure Laterality Date     APPENDECTOMY       JOINT REPLACEMENT      knee     OOPHORECTOMY Right 1990     OH LIGATE FALLOPIAN TUBE      Description: Tubal Ligation;  Recorded: 05/24/2011;     Social History     Socioeconomic History     Marital status:      Spouse name: Not on file     Number of children: Not on file     Years of education: Not on file     Highest education level: Not on file   Occupational History     Not on file   Social Needs     Financial resource strain: Not on file     Food insecurity     Worry: Not on file     Inability: Not on file     Transportation needs     Medical: Not on file     Non-medical: Not on file   Tobacco Use     Smoking status: Never Smoker     Smokeless tobacco: Never Used     Tobacco comment: no second hand smoke exposure   Substance and Sexual Activity     Alcohol use: Yes     Comment: rare     Drug use: No     Sexual activity: Not on file   Lifestyle     Physical activity     Days per week: Not on file     Minutes per session: Not on file     Stress: Not on file   Relationships     Social connections     Talks on phone: Not on file     Gets together: Not on file     Attends [employer] service: Not on file     Active member of club or organization: Not on file     Attends meetings of clubs or organizations: Not on file     Relationship status: Not on file     Intimate partner violence     Fear of current or ex partner: Not on file     Emotionally abused: Not on file     Physically abused: Not on file     Forced sexual activity: Not on file   Other Topics Concern     Not on file   Social History Narrative     Not on file     Family History   Problem Relation Age of Onset     Asthma Son       "Diabetes Brother      HIV Nephew         AIDS- nephew (sister's son)  ate 20s; contracted from his wife, had twins-daughter  of AIDS age 8; son o     Liver cancer Sister          age 50; 1997     Breast cancer Sister 50         2000     Heart disease Sister         enlarged heart     Heart disease Brother         enlarged heart     Asthma Other         niece and grandson     Cancer Maternal Aunt         \"went thru whole body..not sure where it started\",  in 30s       Meds:    Current Outpatient Medications:      albuterol (PROVENTIL) 2.5 mg /3 mL (0.083 %) nebulizer solution, NEBULIZE 1 VIAL BY MOUTH EVERY 4 HOURS AS NEEDED FOR WHEEZING., Disp: 180 mL, Rfl: 0     albuterol (VENTOLIN HFA) 90 mcg/actuation inhaler, Inhale 2 puffs every 4 (four) hours as needed for wheezing., Disp: 18 g, Rfl: 1     atorvastatin (LIPITOR) 40 MG tablet, Take 1 tablet (40 mg total) by mouth daily., Disp: 90 tablet, Rfl: 0     cetirizine (ZYRTEC) 10 MG tablet, Take 1 tablet (10 mg total) by mouth daily., Disp: 90 tablet, Rfl: 1     fluticasone propionate (FLONASE) 50 mcg/actuation nasal spray, 2 sprays into each nostril daily., Disp: 10 g, Rfl: 3     hydroCHLOROthiazide (HYDRODIURIL) 25 MG tablet, Take 1 tablet (25 mg total) by mouth daily., Disp: 90 tablet, Rfl: 3     HYDROcodone-acetaminophen (NORCO) 7.5-325 mg per tablet, TAKE ONE TABLET BY MOUTH THREE TIMES DAILY, Disp: 90 tablet, Rfl: 0     hydrOXYzine HCl (ATARAX) 25 MG tablet, Take 1 tablet (25 mg total) by mouth every 8 (eight) hours as needed for itching., Disp: 90 tablet, Rfl: 6     losartan (COZAAR) 100 MG tablet, Take 1 tablet (100 mg total) by mouth daily., Disp: 90 tablet, Rfl: 3     metoprolol succinate (TOPROL-XL) 100 MG 24 hr tablet, Take 1 tablet by mouth daily, Disp: 90 tablet, Rfl: 0     SYMBICORT 160-4.5 mcg/actuation inhaler, INHALE 2 PUFFS BY MOUTH 2 (TWO) TIMES A DAY., Disp: 1 Inhaler, Rfl: 0     triamcinolone (KENALOG) 0.1 % cream, APPLY " TOPICALLY TO RASH ON BOTH LEGS TWO TIMES A DAY FOR 2 TO 3 WEEKS., Disp: 80 g, Rfl: 0     venlafaxine (EFFEXOR-XR) 150 MG 24 hr capsule, Take 1 capsule (150 mg total) by mouth daily., Disp: 90 capsule, Rfl: 3    Allergies:  No Known Allergies    ROS:  Pertinent positives as noted in HPI; otherwise 12 point ROS negative.      Physical Exam:  EXAM:  /83 (Patient Site: Right Arm, Patient Position: Sitting, Cuff Size: Adult Large)   Pulse 83   Temp 98.5  F (36.9  C) (Tympanic)   Resp 14   Wt (!) 265 lb (120.2 kg)   LMP 06/08/2013   BMI 42.45 kg/m     Gen:  NAD, appears well, well-hydrated, morbid obesity  HEENT:  TMs nl, oropharynx benign, nasal mucosa nl, conjunctiva clear  Neck:  Supple, no adenopathy, no thyromegaly, no carotid bruits, no JVD  Lungs:  Clear to auscultation bilaterally  Cor:  RRR no murmur  Abd:  Soft, nontender, BS+, no masses, no guarding or rebound, no HSM  Extr:  Neg.  Neuro:  No asymmetry, Nl motor tone/strength, nl sensation, reflexes =, gait nl, nl coordination, CN intact, \  Skin:  Warm/dry        Results:  Results for orders placed or performed in visit on 06/22/20   Glycosylated Hemoglobin A1c   Result Value Ref Range    Hemoglobin A1c 6.5 (H) 3.5 - 6.0 %   Comprehensive Metabolic Panel   Result Value Ref Range    Sodium 141 136 - 145 mmol/L    Potassium 3.6 3.5 - 5.0 mmol/L    Chloride 103 98 - 107 mmol/L    CO2 26 22 - 31 mmol/L    Anion Gap, Calculation 12 5 - 18 mmol/L    Glucose 101 70 - 125 mg/dL    BUN 9 8 - 22 mg/dL    Creatinine 0.79 0.60 - 1.10 mg/dL    GFR MDRD Af Amer >60 >60 mL/min/1.73m2    GFR MDRD Non Af Amer >60 >60 mL/min/1.73m2    Bilirubin, Total 0.6 0.0 - 1.0 mg/dL    Calcium 9.1 8.5 - 10.5 mg/dL    Protein, Total 6.9 6.0 - 8.0 g/dL    Albumin 3.5 3.5 - 5.0 g/dL    Alkaline Phosphatase 111 45 - 120 U/L    AST 20 0 - 40 U/L    ALT 16 0 - 45 U/L   Lipid Cascade FASTING   Result Value Ref Range    Cholesterol 126 <=199 mg/dL    Triglycerides 153 (H) <=149 mg/dL     HDL Cholesterol 33 (L) >=50 mg/dL    LDL Calculated 62 <=129 mg/dL    Patient Fasting > 8hrs? No

## 2021-06-09 NOTE — TELEPHONE ENCOUNTER
RN cannot approve Refill Request    RN can NOT refill this medication Protocol failed and NO refill given.    Leann Nguyen, [facility] Triage/Med Refill 6/22/2020    Requested Prescriptions   Pending Prescriptions Disp Refills     venlafaxine (EFFEXOR-XR) 150 MG 24 hr capsule [Pharmacy Med Name: Venlafaxine HCl ER Oral Capsule Extended Release 24 Hour 150 MG] 90 capsule 3     Sig: Take 1 capsule (150 mg total) by mouth daily.       Venlafaxine/Desvenlafaxine Refill Protocol Failed - 6/21/2020 12:09 PM        Failed - Fasting lipid cascade in last year     Cholesterol   Date Value Ref Range Status   07/19/2018 216 (H) <=199 mg/dL Final     Triglycerides   Date Value Ref Range Status   07/19/2018 130 <=149 mg/dL Final     HDL Cholesterol   Date Value Ref Range Status   07/19/2018 45 (L) >=50 mg/dL Final     LDL Calculated   Date Value Ref Range Status   07/19/2018 145 (H) <=129 mg/dL Final     Patient Fasting > 8hrs?   Date Value Ref Range Status   07/19/2018 Unknown  Final             Passed - LFT or AST or ALT in last year     Albumin   Date Value Ref Range Status   11/12/2019 3.6 3.5 - 5.0 g/dL Final     Bilirubin, Total   Date Value Ref Range Status   11/12/2019 0.5 0.0 - 1.0 mg/dL Final     Alkaline Phosphatase   Date Value Ref Range Status   11/12/2019 110 45 - 120 U/L Final     AST   Date Value Ref Range Status   11/12/2019 23 0 - 40 U/L Final     ALT   Date Value Ref Range Status   11/12/2019 17 0 - 45 U/L Final     Protein, Total   Date Value Ref Range Status   11/12/2019 6.9 6.0 - 8.0 g/dL Final                Passed - PCP or prescribing provider visit in last year     Last office visit with prescriber/PCP: 5/28/2019 Aj Conrad MD OR same dept: Visit date not found OR same specialty: 5/28/2019 Aj Conrad MD  Last physical: 11/12/2019 Last MTM visit: Visit date not found   Next visit within 3 mo: Visit date not found  Next physical within 3 mo: Visit date not found  Prescriber OR PCP: Aj  ROSALINE Conrad MD  Last diagnosis associated with med order: 1. Moderate single current episode of major depressive disorder (H)  - venlafaxine (EFFEXOR-XR) 150 MG 24 hr capsule [Pharmacy Med Name: Venlafaxine HCl ER Oral Capsule Extended Release 24 Hour 150 MG]; Take 1 capsule (150 mg total) by mouth daily.  Dispense: 90 capsule; Refill: 0    If protocol passes may refill for 12 months if within 3 months of last provider visit (or a total of 15 months).             Passed - Blood Pressure in last year     BP Readings from Last 1 Encounters:   11/12/19 138/88

## 2021-06-10 NOTE — TELEPHONE ENCOUNTER
Controlled Substance Refill Request  Medication Name:   Requested Prescriptions     Pending Prescriptions Disp Refills     HYDROcodone-acetaminophen (NORCO) 7.5-325 mg per tablet 90 tablet 0     Sig: Take 1 tablet by mouth 3 (three) times a day.     Date Last Fill: 7/4/20  [facility]ed [facility]: [id number]  Submit electronically to pharmacy  Controlled Substance Agreement on file:   Encounter-Level CSA Scan Date:    There are no encounter-level csa scan date.        Last office visit:  6/22/20

## 2021-06-10 NOTE — TELEPHONE ENCOUNTER
Refill Approved    Rx renewed per Medication Renewal Policy. Medication was last renewed on 1/1/20, last OV 6/22/20.    Becka Ha, [facility] Triage/Med Refill 8/2/2020     Requested Prescriptions   Pending Prescriptions Disp Refills     hydrOXYzine HCL (ATARAX) 25 MG tablet [Pharmacy Med Name: hydrOXYzine HCl Oral Tablet 25 MG] 90 tablet 0     Sig: Take 1 tablet by mouth every 8 hours as needed for itching.       Antihistamine Refill Protocol Passed - 8/1/2020  7:03 AM        Passed - Patient has had office visit/physical in last year     Last office visit with prescriber/PCP: 5/28/2019 Aj Conrad MD OR same dept: 6/22/2020 Rolanda Encarnacion MD OR same specialty: 6/22/2020 Rolanda Encarnacion MD  Last physical: 11/12/2019 Last MTM visit: Visit date not found   Next visit within 3 mo: Visit date not found  Next physical within 3 mo: Visit date not found  Prescriber OR PCP: Aj Conrad MD  Last diagnosis associated with med order: 1. Allergic rhinitis  - hydrOXYzine HCL (ATARAX) 25 MG tablet [Pharmacy Med Name: hydrOXYzine HCl Oral Tablet 25 MG]; Take 1 tablet by mouth every 8 hours as needed for itching.  Dispense: 90 tablet; Refill: 0    2. FOX (generalized anxiety disorder)  - hydrOXYzine HCL (ATARAX) 25 MG tablet [Pharmacy Med Name: hydrOXYzine HCl Oral Tablet 25 MG]; Take 1 tablet by mouth every 8 hours as needed for itching.  Dispense: 90 tablet; Refill: 0    If protocol passes may refill for 12 months if within 3 months of last provider visit (or a total of 15 months).

## 2021-06-10 NOTE — TELEPHONE ENCOUNTER
Dr. Conrad, Medication refill requested. Please authorize medication if appropriate. Thank you.

## 2021-06-11 NOTE — PROGRESS NOTES
Pre-operative Exam  [facility]  [phone]    Rebecca Smalls,    1965    Upcoming surgery date: 17  Surgeon: Tea  Surgery Facility: [facility]    Chief Complaint: shoulder pain    Subjective  History of Present Illness:   Rebecca Smalls is a 51 y.o. female with long term left shoulder pain.  Has tried injections and PT without improvement.  Has sharp pain, stiffness, and weakness.    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     Obstructive Sleep Apnea     Hypercholesterolemia     Essential Hypertension     Mild persistent asthma without complication     Osteoarthritis Of The Knee     Tendonitis Supraspinatus     Os Acromiale Of Left Shoulder     Alcoholism     FOX (generalized anxiety disorder)     OCD (obsessive compulsive disorder)     Panic disorder without agoraphobia     Severe recurrent major depression     Post traumatic stress disorder (PTSD)     Insomnia     Past Surgical History:   Procedure Laterality Date     OOPHORECTOMY Right      CA APPENDECTOMY      Description: Appendectomy;  Proc Date: 1980;     CA LIGATE FALLOPIAN TUBE      Description: Tubal Ligation;  Recorded: 2011;     CA TOTAL KNEE ARTHROPLASTY      Description: Total Knee Arthroplasty;  Recorded: 2013;     CA TOTAL KNEE ARTHROPLASTY      Description: Total Knee Arthroplasty;  Recorded: 2013;      Surgical Risks:  History of bleeding: none  History of tobacco use: none  History of anesthesia reactions: none, personal or family    Social History:  she  reports that she has never smoked. She does not have any smokeless tobacco history on file.    Family History:  Family History   Problem Relation Age of Onset     Asthma Son      Diabetes Brother      HIV Nephew      AIDS- nephew (sister's son)  ate 20s; contracted from his wife, had twins-daughter  of AIDS age 8; son  "o     Liver cancer Sister       age 50; 1997     Breast cancer Sister 50           Heart disease Sister      enlarged heart     Heart disease Brother      enlarged heart     Asthma Other      niece and grandson     Cancer Maternal Aunt      \"went thru whole body..not sure where it started\",  in 30s       Current Medications:  Current Outpatient Prescriptions   Medication Sig Dispense Refill     albuterol (PROVENTIL) 2.5 mg /3 mL (0.083 %) nebulizer solution NEBULIZE 1 VIAL BY MOUTH EVERY 4 HOURS AS NEEDED FOR WHEEZING. 180 mL 0     albuterol (VENTOLIN HFA) 90 mcg/actuation inhaler Inhale 2 puffs every 4 (four) hours as needed for wheezing. 18 g 2     amitriptyline (ELAVIL) 25 MG tablet Take 1 tablet (25 mg total) by mouth bedtime. Take one tablet at night 30 tablet 3     celecoxib (CELEBREX) 200 MG capsule Take 1 capsule (200 mg total) by mouth 2 (two) times a day. 60 capsule 2     cetirizine (ZYRTEC) 10 MG tablet Take 1 tablet (10 mg total) by mouth daily. 90 tablet 1     HYDROcodone-acetaminophen (NORCO) 7.5-325 mg per tablet 1 tid as needed for pain 30 tablet 0     hydrOXYzine (ATARAX) 25 MG tablet Take 1 tablet (25 mg total) by mouth every 6 (six) hours as needed for anxiety (allergies). 90 tablet 1     losartan-hydrochlorothiazide (HYZAAR) 100-25 mg per tablet Take 1 tablet by mouth daily. 90 tablet 1     metoprolol succinate (TOPROL-XL) 50 MG 24 hr tablet Take 1 tablet (50 mg total) by mouth daily. 90 tablet 1     mometasone-formoterol (DULERA) 200-5 mcg/actuation HFAA inhaler Inhale 1 puff 2 (two) times a day. 13 g 2     montelukast (SINGULAIR) 10 mg tablet Take 1 tablet (10 mg total) by mouth bedtime. 90 tablet 1     traZODone (DESYREL) 150 MG tablet Take 1 tablet (150 mg total) by mouth bedtime. 90 tablet 2     triamcinolone (KENALOG) 0.1 % cream APPLY TO RASH TWO TIMES A DAY FOR 2 TO 3 WEEKS 80 g 1     pravastatin (PRAVACHOL) 20 MG tablet Take 1 tablet (20 mg total) by mouth bedtime. " "90 tablet 1     venlafaxine (EFFEXOR-XR) 150 MG 24 hr capsule Take 1 capsule (150 mg total) by mouth daily. 90 capsule 2     No current facility-administered medications for this visit.        Allergies:  Review of patient's allergies indicates no known allergies.    Review of Systems:  GEN: no fevers or chills   ENT: no sinus concerns, normal hearing and vision   RESP: occasional asthma symptoms  CV: no dyspnea, palpitations, edema or chest pain   GI: no nausea, vomiting, diarrhea, or constipation   : has increased urination  ENDO: no hot or cold intolerance, has increased thirst   MS: left shoulder pain   DERM: no rash or bruising   PSYCH: has chronic depression concerns.    Objective:  /80 (Patient Site: Left Arm, Patient Position: Sitting, Cuff Size: Adult Regular) Comment (Patient Site): forearm  Pulse 85  Temp 98.5  F (36.9  C) (Oral)   Ht 5' 6.25\" (1.683 m)  Wt (!) 285 lb (129.3 kg)  LMP 06/08/2013  SpO2 98% Comment: room air  Breastfeeding? No  BMI 45.65 kg/m2   Gen:   Alert, not distressed  Head:   Normocephalic, without obvious abnormality, atraumatic  Eyes:   PERRL, conjunctiva/corneas clear, EOM's intact  Ears:   Normal tympanic membranes and external ear canals  Nose:    Mucosa normal, no drainage or sinus tenderness  Throat:    No erythema or exudates  Neck:    No adenopathy no nodules in thyroid, normal ROM  Lungs:    Clear to auscultation bilaterally, respirations unlabored  Chest wall:  No tenderness or deformity  Heart:     Regular, normal S1 and S2, no murmur, gallop or rub  Abdomen:  Soft, obese, non-tender, normal bowel sounds, no masses, no organomegaly  Back:    Symmetric, no curvature, ROM normal, no CVA tenderness  Extremities:   Extremities normal, atraumatic, no cyanosis or edema  Skin:     Skin color, texture, turgor normal, no rashes or lesions  Lymph nodes:   Cervical and supraclavicular nodes normal  Neurologic:   CNII-XII intact.       Recent Results (from the past 240 " hour(s))   Electrocardiogram Perform and Read   Result Value Ref Range    SYSTOLIC BLOOD PRESSURE  mmHg    DIASTOLIC BLOOD PRESSURE  mmHg    VENTRICULAR RATE 85 BPM    ATRIAL RATE 85 BPM    P-R INTERVAL 164 ms    QRS DURATION 82 ms    Q-T INTERVAL 400 ms    QTC CALCULATION (BEZET) 476 ms    P Axis 39 degrees    R AXIS -17 degrees    T AXIS 59 degrees    MUSE DIAGNOSIS       Normal sinus rhythm  Nonspecific T wave abnormality  Abnormal ECG  When compared with ECG of 19-AUG-2013 08:15,  No significant change was found  Confirmed by SHADI MA, LES LOC: ([id number]) on 7/6/2017 4:56:35 PM     Basic Metabolic Panel   Result Value Ref Range    Sodium 139 136 - 145 mmol/L    Potassium 3.7 3.5 - 5.0 mmol/L    Chloride 99 98 - 107 mmol/L    CO2 28 22 - 31 mmol/L    Anion Gap, Calculation 12 5 - 18 mmol/L    Glucose 230 (H) 70 - 125 mg/dL    Calcium 9.3 8.5 - 10.5 mg/dL    BUN 10 8 - 22 mg/dL    Creatinine 0.84 0.60 - 1.10 mg/dL    GFR MDRD Af Amer >60 >60 mL/min/1.73m2    GFR MDRD Non Af Amer >60 >60 mL/min/1.73m2   Glycosylated Hemoglobin A1C   Result Value Ref Range    Hemoglobin A1c 6.5 (H) 3.5 - 6.0 %      Assessment:  Preop assessment   New diagnosis of diabetes.  Well controlled.  Monitor sugar post operatively.    Plan:  Patient is optimized to proceed with the proposed procedure.  Anesthesia: any    Aj Conrad MD  7/6/2017 2:12 PM

## 2021-06-11 NOTE — PROGRESS NOTES
Subjective:  51 y.o. female with concerns L up on lab tests.  Random blood sugar was greater than 200.  Patient did endorse symptoms of polydipsia and polyuria.  She is drinking some sweetened beverages.  We discussed decreasing them.  Shoulder surgery scheduled for later this week.    Outpatient Medications Prior to Visit   Medication Sig Dispense Refill     albuterol (PROVENTIL) 2.5 mg /3 mL (0.083 %) nebulizer solution NEBULIZE 1 VIAL BY MOUTH EVERY 4 HOURS AS NEEDED FOR WHEEZING. 180 mL 0     albuterol (VENTOLIN HFA) 90 mcg/actuation inhaler Inhale 2 puffs every 4 (four) hours as needed for wheezing. 18 g 2     amitriptyline (ELAVIL) 25 MG tablet Take 1 tablet (25 mg total) by mouth bedtime. Take one tablet at night 30 tablet 3     celecoxib (CELEBREX) 200 MG capsule Take 1 capsule (200 mg total) by mouth 2 (two) times a day. 60 capsule 2     cetirizine (ZYRTEC) 10 MG tablet Take 1 tablet (10 mg total) by mouth daily. 90 tablet 1     HYDROcodone-acetaminophen (NORCO) 7.5-325 mg per tablet 1 tid as needed for pain 30 tablet 0     hydrOXYzine (ATARAX) 25 MG tablet Take 1 tablet (25 mg total) by mouth every 6 (six) hours as needed for anxiety (allergies). 90 tablet 1     losartan-hydrochlorothiazide (HYZAAR) 100-25 mg per tablet Take 1 tablet by mouth daily. 90 tablet 1     metoprolol succinate (TOPROL-XL) 50 MG 24 hr tablet Take 1 tablet (50 mg total) by mouth daily. 90 tablet 1     mometasone-formoterol (DULERA) 200-5 mcg/actuation HFAA inhaler Inhale 1 puff 2 (two) times a day. 13 g 2     montelukast (SINGULAIR) 10 mg tablet Take 1 tablet (10 mg total) by mouth bedtime. 90 tablet 1     pravastatin (PRAVACHOL) 20 MG tablet Take 1 tablet (20 mg total) by mouth bedtime. 90 tablet 1     traZODone (DESYREL) 150 MG tablet Take 1 tablet (150 mg total) by mouth bedtime. 90 tablet 2     triamcinolone (KENALOG) 0.1 % cream APPLY TO RASH TWO TIMES A DAY FOR 2 TO 3 WEEKS 80 g 1     venlafaxine (EFFEXOR-XR) 150 MG 24 hr  capsule Take 1 capsule (150 mg total) by mouth daily. 90 capsule 2     No facility-administered medications prior to visit.       History   Smoking Status     Never Smoker   Smokeless Tobacco     Not on file     Comment: no second hand smoke exposure      Objective:  /70 (Patient Site: Right Arm, Patient Position: Sitting, Cuff Size: Adult Regular) Comment (Patient Site): forearm  Pulse 80  Wt (!) 282 lb (127.9 kg)  LMP 06/08/2013  BMI 45.17 kg/m2  GENERAL: alert, not distressed  CHEST: clear, no rales, rhonchi, or wheezes  CARDIAC: regular without murmur  ABDOMEN: soft, non tender, non distended, normal bowel sounds    Recent Results (from the past 48 hour(s))   Glycosylated Hemoglobin A1C   Result Value Ref Range    Hemoglobin A1c 6.5 (H) 3.5 - 6.0 %   Basic Metabolic Panel   Result Value Ref Range    Sodium 140 136 - 145 mmol/L    Potassium 3.8 3.5 - 5.0 mmol/L    Chloride 100 98 - 107 mmol/L    CO2 27 22 - 31 mmol/L    Anion Gap, Calculation 13 5 - 18 mmol/L    Glucose 152 (H) 70 - 125 mg/dL    Calcium 9.5 8.5 - 10.5 mg/dL    BUN 11 8 - 22 mg/dL    Creatinine 0.84 0.60 - 1.10 mg/dL    GFR MDRD Af Amer >60 >60 mL/min/1.73m2    GFR MDRD Non Af Amer >60 >60 mL/min/1.73m2       Assessment and Plan:   1. Type 2 diabetes.  Controlled.  New diagnosis.  Discussed that medication is not currently needed to control her blood sugars.  Discussed ways of decreasing simple sugars in her diet.  She does drink some regular soda and drinks Gatorade fairly frequently.  She at least uses a only semi-sweetened preparation.    She can proceed with her shoulder surgery.  Will watch blood sugars in the postpartum period.  She can follow-up 1 or 2 months into rehab from her surgery, meet with a diabetic educator, and reassess for further diabetic cares.

## 2021-06-12 NOTE — TELEPHONE ENCOUNTER
Controlled Substance Refill Request  Medication Name:   Requested Prescriptions     Pending Prescriptions Disp Refills     HYDROcodone-acetaminophen (NORCO) 7.5-325 mg per tablet [Pharmacy Med Name: HYDROcodone-Acetaminophen Oral Tablet 7.5-325 MG] 90 tablet 0     Sig: TAKE ONE TABLET BY MOUTH THREE TIMES DAILY     Date Last Fill: 9/2/2020  [facility]  Submit electronically to pharmacy  Controlled Substance Agreement on file:   Encounter-Level CSA Scan Date:    There are no encounter-level csa scan date.        Last office visit:  6/22/2020  Please let Aj Conrad MD know I did reschedule my appointment on 10/12/2020.

## 2021-06-12 NOTE — PROGRESS NOTES
Pre-operative Exam  [facility]  [phone]    Rebecca Smalls,    1965    Upcoming surgery date: 17 (tomorrow)  Surgeon: Tea  Surgery Facility: [facility]    Chief Complaint: shoulder pain    Planned procedure:  Left shoulder arthroscopy, acromioplasty, possible tenotomy     Subjective  History of Present Illness:   Rebecca Smalls is a 52 y.o. female with long term left shoulder pain.  Has tried injections and PT without improvement.  Has sharp pain, stiffness, and weakness.  Cannot sleep due to pain.    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     Obstructive Sleep Apnea     Hypercholesterolemia     Essential Hypertension     Mild persistent asthma without complication     Osteoarthritis Of The Knee     Tendonitis Supraspinatus     Os Acromiale Of Left Shoulder     Alcoholism     FOX (generalized anxiety disorder)     OCD (obsessive compulsive disorder)     Panic disorder without agoraphobia     Severe recurrent major depression     Post traumatic stress disorder (PTSD)     Insomnia     Past Surgical History:   Procedure Laterality Date     APPENDECTOMY       JOINT REPLACEMENT      knee     OOPHORECTOMY Right      VT LIGATE FALLOPIAN TUBE      Description: Tubal Ligation;  Recorded: 2011;      Surgical Risks:  History of bleeding: none  History of tobacco use: none  History of anesthesia reactions: none, personal or family    Social History:  she  reports that she has never smoked. She has never used smokeless tobacco. She reports that she drinks alcohol. She reports that she does not use illicit drugs.    Family History:  Family History   Problem Relation Age of Onset     Asthma Son      Diabetes Brother      HIV Nephew      AIDS- nephew (sister's son)  ate 20s; contracted from his wife, had twins-daughter  of AIDS age 8; son o     Liver cancer Sister      "  age 50; 1997     Breast cancer Sister 50           Heart disease Sister      enlarged heart     Heart disease Brother      enlarged heart     Asthma Other      niece and grandson     Cancer Maternal Aunt      \"went thru whole body..not sure where it started\",  in 30s       Current Medications:  Current Outpatient Prescriptions   Medication Sig Dispense Refill     albuterol (PROVENTIL) 2.5 mg /3 mL (0.083 %) nebulizer solution NEBULIZE 1 VIAL BY MOUTH EVERY 4 HOURS AS NEEDED FOR WHEEZING. 180 mL 0     albuterol (VENTOLIN HFA) 90 mcg/actuation inhaler Inhale 2 puffs every 4 (four) hours as needed for wheezing. 18 g 2     amitriptyline (ELAVIL) 25 MG tablet Take 1 tablet (25 mg total) by mouth bedtime. Take one tablet at night 30 tablet 3     celecoxib (CELEBREX) 200 MG capsule Take 1 capsule (200 mg total) by mouth 2 (two) times a day. 60 capsule 2     cetirizine (ZYRTEC) 10 MG tablet Take 1 tablet (10 mg total) by mouth daily. 90 tablet 1     HYDROcodone-acetaminophen (NORCO) 7.5-325 mg per tablet TAKE ONE TABLET BY MOUTH THREE TIMES DAILY AS NEEDED FOR PAIN  30 tablet 0     hydrOXYzine (ATARAX) 25 MG tablet Take 1 tablet (25 mg total) by mouth every 6 (six) hours as needed for anxiety (allergies). 90 tablet 1     losartan-hydrochlorothiazide (HYZAAR) 100-25 mg per tablet Take 1 tablet by mouth daily. 90 tablet 1     metoprolol succinate (TOPROL-XL) 50 MG 24 hr tablet Take 1 tablet (50 mg total) by mouth daily. 90 tablet 1     mometasone-formoterol (DULERA) 200-5 mcg/actuation HFAA inhaler Inhale 1 puff 2 (two) times a day. 13 g 2     montelukast (SINGULAIR) 10 mg tablet Take 1 tablet (10 mg total) by mouth bedtime. 90 tablet 1     pravastatin (PRAVACHOL) 20 MG tablet Take 1 tablet (20 mg total) by mouth bedtime. 90 tablet 1     traZODone (DESYREL) 150 MG tablet Take 1 tablet (150 mg total) by mouth bedtime. 90 tablet 2     triamcinolone (KENALOG) 0.1 % cream APPLY TO RASH TWO TIMES A DAY FOR " "2 TO 3 WEEKS 80 g 1     venlafaxine (EFFEXOR-XR) 150 MG 24 hr capsule Take 1 capsule (150 mg total) by mouth daily. 90 capsule 2     No current facility-administered medications for this visit.        Allergies:  Review of patient's allergies indicates no known allergies.    Review of Systems:  GEN: no fevers or chills   ENT: no sinus concerns, normal hearing and vision   RESP: occasional asthma symptoms  CV: no dyspnea, palpitations, edema or chest pain   GI: no nausea, vomiting, diarrhea, or constipation   : has increased urination  ENDO: no hot or cold intolerance, has increased thirst   MS: left shoulder pain   DERM: no rash or bruising   PSYCH: has chronic depression concerns.    Objective:  /70 (Patient Site: Right Arm, Patient Position: Sitting, Cuff Size: Adult Regular) Comment (Patient Site): forearm  Pulse 85  Temp 97.5  F (36.4  C) (Oral)   Ht 5' 6.25\" (1.683 m)  Wt (!) 281 lb (127.5 kg)  LMP 06/08/2013  SpO2 98%  Breastfeeding? No  BMI 45.01 kg/m2   Gen:   Alert, not distressed  Head:   Normocephalic, without obvious abnormality, atraumatic  Eyes:   PERRL, conjunctiva/corneas clear, EOM's intact  Ears:   Normal tympanic membranes and external ear canals  Nose:    Mucosa normal, no drainage or sinus tenderness  Throat:    No erythema or exudates  Neck:    No adenopathy no nodules in thyroid, normal ROM  Lungs:    Clear to auscultation bilaterally, respirations unlabored  Chest wall:  No tenderness or deformity  Heart:     Regular, normal S1 and S2, no murmur, gallop or rub  Abdomen:  Soft, obese, non-tender, normal bowel sounds, no masses, no organomegaly  Back:    Symmetric, no curvature, ROM normal, no CVA tenderness  Extremities:   Extremities normal, atraumatic, no cyanosis or edema  Skin:     Skin color, texture, turgor normal, no rashes or lesions  Lymph nodes:   Cervical and supraclavicular nodes normal  Neurologic:   CNII-XII intact.       BMP pending     Assessment:  Preop " assessment     Plan:  Patient is optimized to proceed with the proposed procedure.  Will take beta blocker with sip of water in AM.  Anesthesia: cher Conrad MD  8/23/2017 2:12 PM

## 2021-06-12 NOTE — TELEPHONE ENCOUNTER
----- Message from Aj Conrad MD sent at 11/6/2020  9:24 AM CST -----  Will need recheck of blood pressure and an appointment for mammogram.  Could do Pap smear as well.

## 2021-06-12 NOTE — PROGRESS NOTES
Subjective:  55 y.o. female with concerns of follow-up on numerous medical issue.    She is a bit concerned about her own health.  Her sister was at [facility].  She had some kind of cardiac surgery for what patient describes as an enlarged heart.  Details are entirely clear to me but it is caused her to stop taking some of her medications.  Though she does states she was continuing to take her antihypertensives.    Patient with history of chronic pain and lower extremities.  She has been maintained with fairly regular hydrocodone.  She states she has not taken any for a few days again.    Patient previously provided with a Cologuard kit for colorectal cancer screening.  The wants to complete that but has not had a incidence of solid enough stool.  She denies melena or blood in her stool.  She thinks stools have been this consistency for months.    She does not think her breathing is very good.  She gets very tired with walking.  She cannot do any stairs.    She is due for mammogram and Pap smear but declines those today.  She be willing to follow-up for them later.    Asthma Control Test:  In the past 4 weeks, how much of the time did your asthma keep you from getting as much done at work, school, or at home?: 3 (11/5/2020  1:33 PM)  During the past 4 weeks, how often have you had shortness of breath?: 4 (11/5/2020  1:33 PM)  During the past 4 weeks, how often did your asthma symptoms (wheezing, coughing, shortness of breath, chest tightness or pain) wake you up at night or earlier in the morning?: 1 (11/5/2020  1:33 PM)  During the past 4 weeks, how often have you used your rescue inhaler or nebulizer medication (such as albuterol)?: 1 (11/5/2020  1:33 PM)  How would you rate your asthma control during the past 4 weeks?: 3 (11/5/2020  1:33 PM)  ACT Total Score: (!) 12 (11/5/2020  1:33 PM)  In the past 12 months, have you visited the emergency room due to your asthma?: No (11/5/2020  1:33 PM)  In the past 12 months,  "have you been hospitalized due to your asthma?: No (11/5/2020  1:33 PM)     Outpatient Medications Prior to Visit   Medication Sig Dispense Refill     albuterol (PROVENTIL) 2.5 mg /3 mL (0.083 %) nebulizer solution NEBULIZE 1 VIAL BY MOUTH EVERY 4 HOURS AS NEEDED FOR WHEEZING. 180 mL 0     atorvastatin (LIPITOR) 40 MG tablet Take 1 tablet by mouth daily. 90 tablet 0     cetirizine (ZYRTEC) 10 MG tablet Take 1 tablet (10 mg total) by mouth daily. 90 tablet 1     fluticasone propionate (FLONASE) 50 mcg/actuation nasal spray 2 sprays into each nostril daily. 10 g 3     hydroCHLOROthiazide (HYDRODIURIL) 25 MG tablet Take 1 tablet (25 mg total) by mouth daily. 90 tablet 3     hydrOXYzine HCL (ATARAX) 25 MG tablet Take 1 tablet by mouth every 8 hours as needed for itching. 90 tablet 2     metoprolol succinate (TOPROL-XL) 100 MG 24 hr tablet Take 1 tablet by mouth daily 90 tablet 0     SYMBICORT 160-4.5 mcg/actuation inhaler INHALE 2 PUFFS BY MOUTH 2 TIMES A DAY. 1 Inhaler 0     triamcinolone (KENALOG) 0.1 % cream APPLY TOPICALLY TO RASH ON BOTH LEGS TWO TIMES A DAY FOR 2 TO 3 WEEKS. 80 g 0     venlafaxine (EFFEXOR-XR) 150 MG 24 hr capsule Take 1 capsule (150 mg total) by mouth daily. 90 capsule 3     VENTOLIN HFA 90 mcg/actuation inhaler INHALE 2 PUFFS BY MOUTH  EVERY 4 (FOUR) HOURS AS NEEDED FOR WHEEZING. 18 g 0     HYDROcodone-acetaminophen (NORCO) 7.5-325 mg per tablet TAKE ONE TABLET BY MOUTH THREE TIMES DAILY 90 tablet 0     losartan (COZAAR) 100 MG tablet Take 1 tablet (100 mg total) by mouth daily. 90 tablet 3     No facility-administered medications prior to visit.       Social History     Tobacco Use   Smoking Status Never Smoker   Smokeless Tobacco Never Used   Tobacco Comment    no second hand smoke exposure      Objective:  BP (!) 162/100 (Patient Site: Left Arm, Patient Position: Sitting, Cuff Size: Adult Regular) Comment (Patient Site): forearm  Pulse 82   Ht 5' 6.25\" (1.683 m)   Wt (!) 260 lb 12 oz " (118.3 kg)   [facility] 06/08/2013   BMI 41.77 kg/m    GENERAL: alert, not distressed  CHEST: clear, no rales, rhonchi, or wheezes  CARDIAC: regular without murmur, gallop, or rub  ABDOMEN: obese, soft, non tender, non distended, normal bowel sounds    FOOT EXAM:  DP and PT pulses are normal.  Monofilament testing nomral  Nails normal.  Hair growth absent.  No skin changes     Recent Results (from the past 24 hour(s))   Drug Abuse 1+, Urine   Result Value Ref Range    Amphetamines Screen Negative Screen Negative    Benzodiazepines Screen Negative Screen Negative    Opiates Screen Negative Screen Negative    Phencyclidine Screen Negative Screen Negative    THC Screen Negative Screen Negative    Barbiturates Screen Negative Screen Negative    Cocaine Metabolite Screen Negative Screen Negative    Methadone Screen Negative Screen Negative    Oxycodone Screen Negative Screen Negative    Creatinine, Urine 124.9 mg/dL   Microalbumin, Random Urine   Result Value Ref Range    Microalbumin, Random Urine 3.09 (H) 0.00 - 1.99 mg/dL    Creatinine, Urine 124.9 mg/dL    Microalbumin/Creatinine Ratio Random Urine 24.7 (H) <=19.9 mg/g   Glycosylated Hemoglobin A1c   Result Value Ref Range    Hemoglobin A1c 7.2 (H) <=5.6 %   HM2(CBC w/o Differential)   Result Value Ref Range    WBC 8.9 4.0 - 11.0 thou/uL    RBC 4.24 3.80 - 5.40 mill/uL    Hemoglobin 13.3 12.0 - 16.0 g/dL    Hematocrit 41.3 35.0 - 47.0 %    MCV 98 80 - 100 fL    MCH 31.4 27.0 - 34.0 pg    MCHC 32.2 32.0 - 36.0 g/dL    RDW 11.3 11.0 - 14.5 %    Platelets 249 140 - 440 thou/uL    MPV 7.9 7.0 - 10.0 fL      Assessment and Plan:   1. Type 2 diabetes mellitus without complication, without long-term current use of insulin (H)  Control has slightly worsened.  I think it is difficult for her to be active.  We discussed start of Metformin to help lower blood sugars  - Microalbumin, Random Urine  - Glycosylated Hemoglobin A1c  - Ambulatory referral to Ophthalmology    2. Dyspnea on  exertion  She attributes this to asthma though is not clear to me that this is the case.  I am concerned about dyspnea upon exertion being more cardiac related particularly with her recently reported family history of what sounds like some kind of heart failure.  Recommended evaluation with a cardiologist.  - HM2(CBC w/o Differential)  - Ambulatory referral to Cardiology    3. Chronic, continuous use of opioids  4. Chronic pain syndrome   Pain contract was reviewed today.  She signed this again.  - Drug Abuse 1+, Urine    5. Essential hypertension  She reports continuing metoprolol, losartan, and hydrochlorothiazide I am a bit skeptical that she may not have been taking them all given blood pressure advised adherence and addition of calcium channel blocker as amlodipine 5 million  - losartan (COZAAR) 100 MG tablet; Take 1 tablet (100 mg total) by mouth daily.  Dispense: 90 tablet; Refill: 3    6. Osteoarthrosis involving lower leg  Renewed  - HYDROcodone-acetaminophen (NORCO) 7.5-325 mg per tablet; Take 1 tablet by mouth 3 (three) times a day.  Dispense: 90 tablet; Refill: 0    7. Need for influenza vaccination  - Influenza, Recombinant, Inj, Quadrivalent, PF, 18+YRS    8. Need for Td vaccine  - Td, Preservative Free (green label)     Return to clinic for mammogram and Pap smear.    Offered some diet suggestions on getting stool consistency to an acceptable place to accomplish the Cologuard test or proceeding with colonoscopy.

## 2021-06-12 NOTE — TELEPHONE ENCOUNTER
FYI - Status Update  Who is Calling: Patient  Update: Patient stated she wanted to let Aj Conrad MD know that she tried to make it to her appointment today but had transportation issues the with the bus line. Patient stated she had to go back home and just rescheduled her appointment.  Okay to leave a detailed message?:  No return call needed

## 2021-06-12 NOTE — TELEPHONE ENCOUNTER
Requested Prescriptions     Pending Prescriptions Disp Refills     HYDROcodone-acetaminophen (NORCO) 7.5-325 mg per tablet [Pharmacy Med Name: HYDROcodone-Acetaminophen Oral Tablet 7.5-325 MG] 90 tablet 0     Sig: TAKE ONE TABLET BY MOUTH THREE TIMES DAILY

## 2021-06-12 NOTE — TELEPHONE ENCOUNTER
Patient Returning Call  Reason for call:  Caller returning call to check on the status of this request.  Information relayed to patient:  Pending providers review and approval.  Patient has additional questions:  yes  If YES, what are your questions/concerns:  Please let Aj Conrad MD know I did reschedule my appointment on 10/12/2020.    Okay to leave a detailed message?: Yes

## 2021-06-12 NOTE — TELEPHONE ENCOUNTER
Please call and help make appointment for patient to come in to be seen with Dr. hanson in Clinic.

## 2021-06-13 NOTE — TELEPHONE ENCOUNTER
Called and left message for pt to return call.# 1 okay to relay message upon return call.    Please schedule per request. Virtual visit could be done only if pt has a bp check machine at home. Otherwise she will need to come in.

## 2021-06-13 NOTE — TELEPHONE ENCOUNTER
Controlled Substance Refill Request  Medication Name:   Requested Prescriptions     Pending Prescriptions Disp Refills     HYDROcodone-acetaminophen (NORCO) 7.5-325 mg per tablet [Pharmacy Med Name: HYDROcodone-Acetaminophen Oral Tablet 7.5-325 MG] 90 tablet 0     Sig: TAKE ONE TABLET BY MOUTH THREE TIMES DAILY     Date Last Fill: 11/5/20  [facility]  Submit electronically to pharmacy  Controlled Substance Agreement on file:   Encounter-Level CSA Scan Date:    There are no encounter-level csa scan date.        Last office visit:  11/5/20

## 2021-06-14 ENCOUNTER — COMMUNICATION - HEALTHEAST (OUTPATIENT)
Dept: FAMILY MEDICINE | Facility: CLINIC | Age: 56
End: 2021-06-14

## 2021-06-14 DIAGNOSIS — F32.1 MODERATE SINGLE CURRENT EPISODE OF MAJOR DEPRESSIVE DISORDER (H): ICD-10-CM

## 2021-06-14 DIAGNOSIS — F41.1 GAD (GENERALIZED ANXIETY DISORDER): ICD-10-CM

## 2021-06-14 RX ORDER — DULOXETIN HYDROCHLORIDE 60 MG/1
CAPSULE, DELAYED RELEASE ORAL
Qty: 90 CAPSULE | Refills: 3 | Status: SHIPPED | OUTPATIENT
Start: 2021-06-14 | End: 2022-03-15

## 2021-06-14 NOTE — PROGRESS NOTES
Rebecca Smalls is a 55 y.o. female who is being evaluated via a billable video visit.      How would you like to obtain your AVS? Mail a copy.  If dropped from the video visit, the video invitation should be resent by: Text to cell phone: [phone]  Will anyone else be joining your video visit? No      Unable to make video encounter work.  BP remains elevated.  EtOH, two beers most days.  Advised on increased pressure.  Venlafaxine might also contribute.  Taking all three meds.    Has not done Cologaurd  Stools not formed enough.      Objective    Vitals - Patient Reported  Systolic (Patient Reported): 166  Diastolic (Patient Reported): 93  Pulse (Patient Reported): 104    1. Essential hypertension  Decrease EtOH  Change mental health med.  Continue her current     2. FOX (generalized anxiety disorder)  3. Moderate single current episode of major depressive disorder (H)  Decrease venlafaxine for a week and then start cymbalta  - DULoxetine (CYMBALTA) 60 MG capsule; Take 1 capsule (60 mg total) by mouth daily. Start after 1 week of decreased venlafaxine dose.  Dispense: 30 capsule; Refill: 0  - venlafaxine (EFFEXOR-XR) 75 MG 24 hr capsule; Take 1 capsule (75 mg total) by mouth daily.  Dispense: 7 capsule; Refill: 0    4. Special screening for malignant neoplasms, colon  - Ambulatory referral for Colonoscopy - [facility]     Needs in person visit for pap  CSA  Needs Mammogram      11 minutes phone call.

## 2021-06-14 NOTE — TELEPHONE ENCOUNTER
----- Message from Aj Conrad MD sent at 1/30/2021  1:59 PM CST -----  Call:  Good news  Mammogram is normal.  Sill need to get pap done

## 2021-06-15 ENCOUNTER — COMMUNICATION - HEALTHEAST (OUTPATIENT)
Dept: FAMILY MEDICINE | Facility: CLINIC | Age: 56
End: 2021-06-15

## 2021-06-15 NOTE — TELEPHONE ENCOUNTER
Controlled Substance Refill Request  Medication Name:   Requested Prescriptions     Pending Prescriptions Disp Refills     HYDROcodone-acetaminophen (NORCO) 7.5-325 mg per tablet [Pharmacy Med Name: HYDROcodone-Acetaminophen Oral Tablet 7.5-325 MG] 90 tablet 0     Sig: TAKE ONE TABLET BY MOUTH THREE TIMES DAILY     Date Last Fill: 1/8/21  [facility]  Submit electronically to pharmacy  Controlled Substance Agreement on file:   Encounter-Level CSA Scan Date:    There are no encounter-level csa scan date.        Last office visit:  1/18/21  Becka Ha RN, MA  [facility]    Triage Nurse Advisor

## 2021-06-15 NOTE — TELEPHONE ENCOUNTER
Refill Approved    Rx renewed per Medication Renewal Policy. Medication was last renewed on 12/4/20.    Jarett Connelly, [facility] Triage/Med Refill 3/8/2021     Requested Prescriptions   Pending Prescriptions Disp Refills     atorvastatin (LIPITOR) 40 MG tablet [Pharmacy Med Name: Atorvastatin Calcium Oral Tablet 40 MG] 90 tablet 0     Sig: TAKE ONE TABLET BY MOUTH ONE TIME DAILY       Statins Refill Protocol (Hmg CoA Reductase Inhibitors) Passed - 3/8/2021  2:00 AM        Passed - PCP or prescribing provider visit in past 12 months      Last office visit with prescriber/PCP: 11/5/2020 Aj Conrad MD OR same dept: 11/5/2020 Aj Conrad MD OR same specialty: 11/5/2020 Aj Conrad MD  Last physical: 11/12/2019 Last MTM visit: Visit date not found   Next visit within 3 mo: Visit date not found  Next physical within 3 mo: Visit date not found  Prescriber OR PCP: Aj Conrad MD  Last diagnosis associated with med order: 1. Type 2 diabetes mellitus without complication, without long-term current use of insulin (H)  - atorvastatin (LIPITOR) 40 MG tablet [Pharmacy Med Name: Atorvastatin Calcium Oral Tablet 40 MG]; TAKE ONE TABLET BY MOUTH ONE TIME DAILY   Dispense: 90 tablet; Refill: 0    If protocol passes may refill for 12 months if within 3 months of last provider visit (or a total of 15 months).

## 2021-06-15 NOTE — TELEPHONE ENCOUNTER
Refill Approved    Rx renewed per Medication Renewal Policy. Medication was last renewed on 01/18/2021.  Last office visit was 01/18/2021 with PCP.  No upcoming appointment on file. Was due to come back for PAP in 1 month from last appointment.    Che Flores, [facility] Triage/Med Refill 2/16/2021     Requested Prescriptions   Pending Prescriptions Disp Refills     DULoxetine (CYMBALTA) 60 MG capsule [Pharmacy Med Name: DULoxetine HCl Oral Capsule Delayed Release Particles 60 MG] 30 capsule 0     Sig: Take 1 capsule by mouth daily. Start after 1 week of decreased venlafaxine dose.       Tricyclics/Misc Antidepressant/Antianxiety Meds Refill Protocol Passed - 2/16/2021 12:12 PM        Passed - PCP or prescribing provider visit in last year     Last office visit with prescriber/PCP: 11/5/2020 Aj Conrad MD OR same dept: 11/5/2020 Aj Conrad MD OR same specialty: 11/5/2020 Aj Conrad MD  Last physical: 11/12/2019 Last MTM visit: Visit date not found   Next visit within 3 mo: Visit date not found  Next physical within 3 mo: Visit date not found  Prescriber OR PCP: Aj Conrad MD  Last diagnosis associated with med order: 1. FOX (generalized anxiety disorder)  - DULoxetine (CYMBALTA) 60 MG capsule [Pharmacy Med Name: DULoxetine HCl Oral Capsule Delayed Release Particles 60 MG]; Take 1 capsule by mouth daily. Start after 1 week of decreased venlafaxine dose.  Dispense: 30 capsule; Refill: 0    2. Moderate single current episode of major depressive disorder (H)  - DULoxetine (CYMBALTA) 60 MG capsule [Pharmacy Med Name: DULoxetine HCl Oral Capsule Delayed Release Particles 60 MG]; Take 1 capsule by mouth daily. Start after 1 week of decreased venlafaxine dose.  Dispense: 30 capsule; Refill: 0    If protocol passes may refill for 12 months if within 3 months of last provider visit (or a total of 15 months).

## 2021-06-15 NOTE — TELEPHONE ENCOUNTER
Controlled Substance Refill Request  Medication Name:   Requested Prescriptions     Pending Prescriptions Disp Refills     HYDROcodone-acetaminophen (NORCO) 7.5-325 mg per tablet [Pharmacy Med Name: HYDROcodone-Acetaminophen Oral Tablet 7.5-325 MG] 90 tablet 0     Sig: TAKE ONE TABLET BY MOUTH THREE TIMES DAILY     Date Last Fill: 2/16/21  [facility]  Submit electronically to pharmacy  Controlled Substance Agreement on file:   Encounter-Level CSA Scan Date:    There are no encounter-level csa scan date.        Last office visit:  1/18/21

## 2021-06-16 ENCOUNTER — COMMUNICATION - HEALTHEAST (OUTPATIENT)
Dept: FAMILY MEDICINE | Facility: CLINIC | Age: 56
End: 2021-06-16

## 2021-06-16 DIAGNOSIS — E11.29 TYPE 2 DIABETES MELLITUS WITH MICROALBUMINURIA, WITHOUT LONG-TERM CURRENT USE OF INSULIN (H): ICD-10-CM

## 2021-06-16 DIAGNOSIS — R80.9 TYPE 2 DIABETES MELLITUS WITH MICROALBUMINURIA, WITHOUT LONG-TERM CURRENT USE OF INSULIN (H): ICD-10-CM

## 2021-06-16 PROBLEM — F11.90 CHRONIC, CONTINUOUS USE OF OPIOIDS: Status: ACTIVE | Noted: 2019-05-29

## 2021-06-16 PROBLEM — E11.9 TYPE 2 DIABETES MELLITUS WITHOUT COMPLICATION (H): Status: ACTIVE | Noted: 2018-07-19

## 2021-06-16 PROBLEM — E66.01 MORBID OBESITY (H): Status: ACTIVE | Noted: 2019-05-28

## 2021-06-16 RX ORDER — HYDROCODONE BITARTRATE AND ACETAMINOPHEN 7.5; 325 MG/1; MG/1
TABLET ORAL
Qty: 90 TABLET | Refills: 0 | Status: SHIPPED | OUTPATIENT
Start: 2021-06-16 | End: 2021-07-14

## 2021-06-16 NOTE — TELEPHONE ENCOUNTER
Telephone Encounter by Ronaldo Guadarrama at 6/11/2019  3:41 PM     Author: Ronaldo Guadarrama Service: -- Author Type: --    Filed: 6/11/2019  5:53 PM Encounter Date: 6/11/2019 Status: Addendum    : Ronaldo Guadarrama    Related Notes: Original Note by Ronaldo Guadarrama filed at 6/11/2019  3:47 PM       [place] APPR[place]ED:    Approval start date: 06/11/2019  Approval end date: 07/11/201    Pharmacy has been notified of approval and will contact patient when medication is ready for pickup.

## 2021-06-16 NOTE — TELEPHONE ENCOUNTER
Controlled Substance Refill Request  Medication Name:   Requested Prescriptions     Pending Prescriptions Disp Refills     HYDROcodone-acetaminophen (NORCO) 7.5-325 mg per tablet [Pharmacy Med Name: HYDROcodone-Acetaminophen Oral Tablet 7.5-325 MG] 90 tablet 0     Sig: TAKE ONE TABLET BY MOUTH THREE TIMES DAILY     Date Last Fill: 3/16/21  [facility]  Submit electronically to pharmacy  Controlled Substance Agreement on file:   Encounter-Level CSA Scan Date:    There are no encounter-level csa scan date.        Last office visit:  1/18/21

## 2021-06-17 NOTE — TELEPHONE ENCOUNTER
Refill Approved    Rx renewed per Medication Renewal Policy. Medication was last renewed on 9/2/20, last OV 1/18/21.    Becka Ha, [facility] Triage/Med Refill 5/15/2021     Requested Prescriptions   Pending Prescriptions Disp Refills     VENTOLIN HFA 90 mcg/actuation inhaler [Pharmacy Med Name: Ventolin HFA Inhalation Aerosol Solution 108 (90 Base) MCG/ACT] 18 g 0     Sig: INHALE 2 PUFFS BY MOUTH  EVERY 4 HOURS AS NEEDED FOR WHEEZING.       Albuterol/Levalbuterol Refill Protocol Passed - 5/14/2021  4:06 PM        Passed - PCP or prescribing provider visit in last year     Last office visit with prescriber/PCP: 11/5/2020 Aj Conrad MD OR same dept: 11/5/2020 Aj Conrad MD OR same specialty: 11/5/2020 Aj Conrad MD Last physical: 11/12/2019       Next appt within 3 mo: Visit date not found  Next physical within 3 mo: Visit date not found  Prescriber OR PCP: Aj Conrad MD  Last diagnosis associated with med order: 1. Mild persistent asthma without complication  - VENTOLIN HFA 90 mcg/actuation inhaler [Pharmacy Med Name: Ventolin HFA Inhalation Aerosol Solution 108 (90 Base) MCG/ACT]; INHALE 2 PUFFS BY MOUTH  EVERY 4 HOURS AS NEEDED FOR WHEEZING.  Dispense: 18 g; Refill: 0    If protocol passes may refill for 6 months if within 3 months of last provider visit (or a total of 9 months). If patient requesting >1 inhaler per month refill x 6 months and have patient make appointment with provider.

## 2021-06-17 NOTE — TELEPHONE ENCOUNTER
Reason for Call:  Medication or medication refill:    Do you use a [facility]?  Name of the pharmacy and phone number for the current request: [facility] on [address]    Name of the medication requested: vicodin    Other request: pt said she has a broken toe.    Can we leave a detailed message on this number? Yes    Phone number patient can be reached at: Home number on file [phone] (home)    Best Time: *asap    Call taken on 5/14/2021 at 4:04 PM by Sally Wheat

## 2021-06-17 NOTE — TELEPHONE ENCOUNTER
Reason for Call:  Medication or medication refill:    Do you use a [facility]?  Name of the pharmacy and phone number for the current request:     Name of the medication requested: vicodin    Other request: pt called Friday asking for this refill and it wasn't done. Can we please refill rx    Can we leave a detailed message on this number? Yes    Phone number patient can be reached at: Home number on file [phone] (home)    Best Time: asap    Call taken on 5/17/2021 at 12:10 PM by Sally Wheat

## 2021-06-17 NOTE — TELEPHONE ENCOUNTER
Provider response below relayed to patient. Message understood.    ----- Message from Aj Conrad MD sent at 5/24/2021  4:30 PM CDT -----  Call:  Your lab tests look good.

## 2021-06-19 NOTE — PROGRESS NOTES
Assessment and Plan:   1. Visit for screening mammogram  - Mammo Screening Bilateral; Future    2. Screen for colon cancer  - Ambulatory referral for Colonoscopy    3. Hypercholesterolemia  - Lipid Cascade FASTING    4. Type 2 diabetes mellitus without complication, without long-term current use of insulin (H)  - Glycosylated Hemoglobin A1c  - Basic Metabolic Panel    5. Mild persistent asthma without complication  - mometasone-formoterol (DULERA) 200-5 mcg/actuation HFAA inhaler; Inhale 1 puff 2 (two) times a day.  Dispense: 13 g; Refill: 2  - albuterol (VENTOLIN HFA) 90 mcg/actuation inhaler; Inhale 2 puffs every 4 (four) hours as needed for wheezing.  Dispense: 18 g; Refill: 1    6. Allergic rhinitis  - hydrOXYzine HCl (ATARAX) 25 MG tablet; Take 1 tablet (25 mg total) by mouth every 8 (eight) hours as needed for itching.  Dispense: 90 tablet; Refill: 3    7. FOX (generalized anxiety disorder)  - hydrOXYzine HCl (ATARAX) 25 MG tablet; Take 1 tablet (25 mg total) by mouth every 8 (eight) hours as needed for itching.  Dispense: 90 tablet; Refill: 3  - Ambulatory referral to Psychology    8. Essential hypertension  - losartan-hydrochlorothiazide (HYZAAR) 100-25 mg per tablet; Take 1 tablet by mouth daily.  Dispense: 90 tablet; Refill: 3  - metoprolol succinate (TOPROL-XL) 50 MG 24 hr tablet; Take 1 tablet (50 mg total) by mouth daily.  Dispense: 90 tablet; Refill: 3    9. Moderate single current episode of major depressive disorder (H)  Should reconnect with Masood, who has worked with her before.  If she needs a referral letter (had no-shows), I will write one.  - venlafaxine (EFFEXOR-XR) 150 MG 24 hr capsule; Take 1 capsule (150 mg total) by mouth daily.  Dispense: 90 capsule; Refill: 1  - Ambulatory referral to Psychology    10. Encounter for screening mammogram for malignant neoplasm of breast    11. Severe recurrent major depression (H)    The patient's current medical problems were reviewed.    I have had  an Advance Directives discussion with the patient.  The following are part of a depression follow up plan for the patient:  psychiatric follow-up  The following high BMI interventions were performed this visit: encouragement to exercise  The following health maintenance schedule was reviewed with the patient and provided in printed form in the after visit summary:   Health Maintenance   Topic Date Due     DIABETES FOOT EXAM  08/02/1973     DIABETES OPHTHALMOLOGY EXAM  08/02/1973     DIABETES URINE MICROALBUMIN  08/02/1973     COLONOSCOPY  08/02/2013     ADVANCE DIRECTIVES DISCUSSED WITH PATIENT  10/18/2015     MAMMOGRAM  06/08/2017     DIABETES HEMOGLOBIN A1C  01/11/2018     INFLUENZA VACCINE RULE BASED (1) 08/01/2018     DEPRESSION FOLLOW UP  01/19/2019     DIABETES FOLLOW-UP  01/19/2019     ASTHMA CONTROL TEST  07/19/2019     ASTHMA FOLLOW-UP  07/19/2019     PAP SMEAR  04/22/2020     TD 18+ HE  10/18/2020     TDAP ADULT ONE TIME DOSE  Completed        Subjective:   Chief Complaint: Rebecca Smalls is an 52 y.o. female here for an Annual Wellness visit.     HPI:  Her for check up.  Needs refills.    PHQ-9:  Little interest or pleasure in doing things: Nearly every day  Feeling down, depressed, or hopeless: Nearly every day  Trouble falling or staying asleep, or sleeping too much: Nearly every day  Feeling tired or having little energy: Nearly every day  Poor appetite or overeating: Nearly every day  Feeling bad about yourself - or that you are a failure or have let yourself or your family down: More than half the days  Trouble concentrating on things, such as reading the newspaper or watching television: More than half the days  Moving or speaking so slowly that other people could have noticed. Or the opposite - being so fidgety or restless that you have been moving around a lot more than usual: Nearly every day  Thoughts that you would be better off dead, or of hurting yourself in some way: More than half the  days  PHQ-9 Total Score: 24  If you checked off any problems, how difficult have these problems made it for you to do your work, take care of things at home, or get along with other people?: Somewhat difficult    Asthma Control Test:  In the past 4 weeks, how much of the time did your asthma keep you from getting as much done at work, school, or at home?: 4 (7/19/2018  1:00 PM)  During the past 4 weeks, how often have you had shortness of breath?: 4 (7/19/2018  1:00 PM)  During the past 4 weeks, how often did your asthma symptoms (wheezing, coughing, shortness of breath, chest tightness or pain) wake you up at night or earlier in the morning?: 2 (7/19/2018  1:00 PM)  During the past 4 weeks, how often have you used your rescue inhaler or nebulizer medication (such as albuterol)?: 2 (7/19/2018  1:00 PM)  How would you rate your asthma control during the past 4 weeks?: 3 (7/19/2018  1:00 PM)  ACT Total Score: 15 (7/19/2018  1:00 PM)  In the past 12 months, have you visited the emergency room due to your asthma?: No (7/19/2018  1:00 PM)  In the past 12 months, have you been hospitalized due to your asthma?: No (7/19/2018  1:00 PM)    Review of Systems:   Please see above.  The rest of the review of systems are negative for all systems.    Patient Care Team:  Aj Conrad MD as PCP - General     Patient Active Problem List   Diagnosis     Allergies     Obesity     Headache     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     Obstructive Sleep Apnea     Hypercholesterolemia     Essential Hypertension     Mild persistent asthma without complication     Osteoarthritis Of The Knee     Tendonitis Supraspinatus     Biceps tendinitis of left upper extremity     Alcoholism (H)     FOX (generalized anxiety disorder)     OCD (obsessive compulsive disorder)     Panic disorder without agoraphobia     Severe recurrent major depression (H)     Post traumatic stress disorder (PTSD)     Insomnia     Glenoid chondromalacia of left  "shoulder     Shoulder impingement syndrome, left     Type 2 diabetes mellitus without complication     Past Medical History:   Diagnosis Date     Alcoholism (H)      Anxiety      Asthma     Mild Persistent without complication     Diabetes mellitus (H)      Hypercholesteremia      Hypertension      Obesity      Obsessive compulsive disorder      Os acromiale of left shoulder      TITA (obstructive sleep apnea)      Osteoarthritis     Knee     Panic disorder without agoraphobia      Peroneal nerve palsy     Left Leg     PTSD (post-traumatic stress disorder)      Severe recurrent major depression (H)      Supraspinatus tendonitis       Past Surgical History:   Procedure Laterality Date     APPENDECTOMY       JOINT REPLACEMENT      knee     OOPHORECTOMY Right      SD LIGATE FALLOPIAN TUBE      Description: Tubal Ligation;  Recorded: 2011;      Family History   Problem Relation Age of Onset     Asthma Son      Diabetes Brother      HIV Nephew      AIDS- nephew (sister's son)  ate 20s; contracted from his wife, had twins-daughter  of AIDS age 8; son o     Liver cancer Sister       age 50; 1997     Breast cancer Sister 50           Heart disease Sister      enlarged heart     Heart disease Brother      enlarged heart     Asthma Other      niece and grandson     Cancer Maternal Aunt      \"went thru whole body..not sure where it started\",  in 30s      Social History     Social History     Marital status:      Spouse name: N/A     Number of children: N/A     Years of education: N/A     Occupational History     Not on file.     Social History Main Topics     Smoking status: Never Smoker     Smokeless tobacco: Never Used      Comment: no second hand smoke exposure     Alcohol use Yes      Comment: rare     Drug use: No     Sexual activity: Not on file     Other Topics Concern     Not on file     Social History Narrative      Current Outpatient Prescriptions   Medication Sig " "Dispense Refill     albuterol (PROVENTIL) 2.5 mg /3 mL (0.083 %) nebulizer solution NEBULIZE 1 VIAL BY MOUTH EVERY 4 HOURS AS NEEDED FOR WHEEZING. 180 mL 0     albuterol (VENTOLIN HFA) 90 mcg/actuation inhaler Inhale 2 puffs every 4 (four) hours as needed for wheezing. 18 g 1     amitriptyline (ELAVIL) 25 MG tablet Take 1 tablet (25 mg total) by mouth bedtime. Take one tablet at night 30 tablet 3     cetirizine (ZYRTEC) 10 MG tablet Take 1 tablet (10 mg total) by mouth daily. 90 tablet 1     HYDROcodone-acetaminophen (NORCO) 7.5-325 mg per tablet TAKE ONE TABLET BY MOUTH THREE TIMES DAILY AS NEEDED FOR PAIN 30 tablet 0     hydrOXYzine HCl (ATARAX) 25 MG tablet Take 1 tablet (25 mg total) by mouth every 8 (eight) hours as needed for itching. 90 tablet 3     ibuprofen (ADVIL,MOTRIN) 600 MG tablet Take 1 tablet (600 mg total) by mouth 3 (three) times a day with meals. 60 tablet 0     losartan-hydrochlorothiazide (HYZAAR) 100-25 mg per tablet Take 1 tablet by mouth daily. 90 tablet 3     metoprolol succinate (TOPROL-XL) 50 MG 24 hr tablet Take 1 tablet (50 mg total) by mouth daily. 90 tablet 3     mometasone-formoterol (DULERA) 200-5 mcg/actuation HFAA inhaler Inhale 1 puff 2 (two) times a day. 13 g 2     pravastatin (PRAVACHOL) 20 MG tablet Take 1 tablet (20 mg total) by mouth bedtime. 90 tablet 1     traZODone (DESYREL) 150 MG tablet Take 1 tablet (150 mg total) by mouth bedtime. 90 tablet 2     triamcinolone (KENALOG) 0.1 % cream APPLY TO RASH TWO TIMES A DAY FOR 2 TO 3 WEEKS 80 g 1     venlafaxine (EFFEXOR-XR) 150 MG 24 hr capsule Take 1 capsule (150 mg total) by mouth daily. 90 capsule 1     No current facility-administered medications for this visit.       Objective:   Vital Signs:   Visit Vitals     /80 (Patient Site: Left Arm, Patient Position: Sitting, Cuff Size: Adult Large)     Pulse 88     Temp 98.2  F (36.8  C) (Oral)     Ht 5' 6.25\" (1.683 m)     Wt (!) 259 lb (117.5 kg)     LMP 06/08/2013     " Breastfeeding No     BMI 41.49 kg/m2        VisionScreening:   Visual Acuity Screening    Right eye Left eye Both eyes   Without correction:      With correction: 10/16 10/16 10/12.5      Assessment Results 7/19/2018   Activities of Daily Living 2-4 - Moderate impairment   Instrumental Activities of Daily Living 2-4 - Moderate impairment   Get Up and Go Score 12 seconds or more   Mini Cog Total Score 4   Some recent data might be hidden     A Mini-Cog score of 0-2 suggests the possibility of dementia, score of 3-5 suggests no dementia    Identified Health Risks:     The patient was provided with suggestions to help her develop a healthy lifestyle.   She is at risk for lack of exercise and has been provided with information to increase physical activity for the benefit of her well-being.  The patient reports that she has difficulty with activities of daily living. I have asked that the patient make a follow up with orthopedics    The patient reports that she has difficulty with instrumental activities of daily living.  She was provided with a list of local organizations that provide support services and advised to make a follow up appointment to address this further.     The patient was provided with written information regarding signs of hearing loss.  The patient's PHQ-9 score is consistent with severe depression.  She was provided with information regarding depression and suicide prevention and was advised to schedule a follow up appointment with psychology ASAP to further address this issue.    She is at risk for falling and has been provided with information to reduce the risk of falling at home.    Information regarding advance directives (living ch), including where she can download the appropriate form, was provided to the patient via the AVS.

## 2021-06-19 NOTE — LETTER
Letter by Aj Conrad MD at      Author: Aj Conrad MD Service: -- Author Type: --    Filed:  Encounter Date: 5/28/2019 Status: (Other)         May 28, 2019     Patient: Rebecca Smalls   YOB: 1965   Date of Visit: 5/28/2019       To Whom it May Concern:    Rebecca Smalls was seen in my clinic on 5/28/2019.    Due to her medical condition of asthma, I would recommend that she have an air conditioner.    If you have any questions or concerns, please don't hesitate to call.    Sincerely,             Electronically signed by Aj Conrad MD   5/28/2019, 2:55 PM

## 2021-06-19 NOTE — LETTER
Letter by Aj Conrad MD at      Author: Aj Conrad MD Service: -- Author Type: --    Filed:  Encounter Date: 5/28/2019 Status: (Other)         [facility] FAMILY MEDICINE/OB  05/28/19    Patient: Rebecca Smalls  YOB: 1965  Medical Record Number: [id number]                                                                  Opioid / Opioid Plus Controlled Substance Agreement    I understand that my care provider has prescribed an opioid (narcotic) controlled substance to help manage my condition(s). I am taking this medicine to help me function or work. I know this is strong medicine, and that it can cause serious side effects. Opioid medicine can be sedating, addicting and may cause a dependency on the drug. They can affect my ability to drive or think, and cause depression. They need to be taken exactly as prescribed. Combining opioids with certain medicines or chemicals (such as cocaine, sedatives and tranquilizers, sleeping pills, meth) can be dangerous or even fatal. Also, if I stop opioids suddenly, I may have severe withdrawal symptoms. Last, I understand that opioids do not work for all types of pain nor for all patients. If not helpful, I may be asked to stop them.    The risks, benefits, and side effects of these medicine(s) were explained to me. I agree that:    1. I will take part in other treatments as advised by my care team. This may be psychiatry or counseling, physical therapy, behavioral therapy, group treatment or a referral to a pain clinic. I will reduce or stop my medicine when my care team tells me to do so.  2. I will take my medicines as prescribed. I will not change the dose or schedule unless my care team tells me to. There will be no refills if I run out early.  I may be contactedwithout warning and asked to complete a urine drug test or pill count at any time.   3. I will keep all my appointments, and understand this is part of the monitoring of opioids. My care  team may require an office visit for EVERY opioid/controlled substance refill. If I miss appointments or dont follow instructions, my care team may stop my medicine.  4. I will not ask other providers to prescribe controlled substances, and I will not accept controlled substances from other people. If I need another prescribed controlled substance for a new reason, I will tell my care team within 1 business day.  5. I will use one pharmacy to fill all of my controlled substance prescriptions, and it is up to me to make sure that I do not run out of my medicines on weekends or holidays. If my care team is willing to refill my opioid prescription without a visit, I must request refills only during office hours, refills may take up to 3 days to process, and it may take up to 5 to 7 days for my medicine to be mailed and ready at my pharmacy. Prescriptions will not be mailed anywhere except my pharmacy.        [id number]  Rev 12/18         Registration to scan to EHR                             Page 1 of 2               Controlled Substance Agreement Opioid        [facility] FAMILY MEDICINE/OB  05/28/19  Patient: Rebecca Smalls  YOB: 1965  Medical Record Number: [id number]                                                                  6. I am responsible for my prescriptions. If the medicine/prescription is lost or stolen, it will not be replaced. I also agree not to share controlled substance medicines with anyone.  7. I agree to not use ANY illegal or recreational drugs. This includes marijuana, cocaine, bath salts or other drugs. I agree not to use alcohol unless my care team says I may.          I agree to give urine samples whenever asked. If I dont give a urine sample, the care team may stop my medicine.    8. If I enroll in the [place] Medical Marijuana program, I will tell my care team. I will also sign an agreement to share my medical records with my care team.   9. I will bring in my list of  medicines (or my medicine bottles) each time I come to the clinic.   10. I will tell my care team right away if I become pregnant or have a new medical problem treated outside of my regular clinic.  11. I understand that this medicine can affect my thinking and judgment. It may be unsafe for me to drive, use machinery and do dangerous tasks. I will not do any of these things until I know how the medicine affects me. If my dose changes, I will wait to see how it affects me. I will contact my care team if I have concerns about medicine side effects.    I understand that if I do not follow any of the conditions above, my prescriptions or treatment may be stopped.      I agree that my provider, clinic care team, and pharmacy may work with any city, state or federal law enforcement agency that investigates the misuse, sale, or other diversion of my controlled medicine. I will allow my provider to discuss my care with or share a copy of this agreement with any other treating provider, pharmacy or emergency room where I receive care. I agree to give up (waive) any right of privacy or confidentiality with respect to these consents.     I have read this agreement and have asked questions about anything I did not understand.      ________________________________________________________________________  Patient signature - Date/Time -  Rebecca Smalls                                      ________________________________________________________________________  Witness signature                                                            ________________________________________________________________________  Provider signature - Aj Conrad MD      [id number]  Rev 12/18         Registration to scan to EHR                         Page 2 of 2                   Controlled Substance Agreement Opioid           Page 1 of 2  Opioid Pain Medicines (also known as Narcotics)  What You Need to Know    What are opioids?   Opioids are pain  medicines that must be prescribed by a doctor.  They are also known as narcotics.    Examples are:     morphine (MS Contin, Yue)    oxycodone (Oxycontin)    oxycodone and acetaminophen (Percocet)    hydrocodone and acetaminophen (Vicodin, Norco)     fentanyl patch (Duragesic)     hydromorphone (Dilaudid)     methadone     What do opioids do well?   Opioids are best for short-term pain after a surgery or injury. They also work well for cancer pain. Unlike other pain medicines, they do not cause liver or kidney failure or ulcers. They may help some people with long-lasting (chronic) pain.     What do opioids NOT do well?   Opioids never get rid of pain entirely, and they do not work well for most patients with chronic pain. Opioids do not reduce swelling, one of the causes of pain. They also dont work well for nerve pain.                           For informational purposes only.  Not to replace the advice of your care provider.  Copyright 201 [facility]. All right reserved. [employer] [id number] 02/18.      Page 2 of 2    Risks and side effects   Talk to your doctor before you start or decide to keep taking one of these medicines. Side effects include:    Lowering your breathing rate enough to cause death    Overdose, including death, especially if taking higher than prescribed doses    Long-term opioid use    Worse depression symptoms; less pleasure in things you usually enjoy    Feeling tired or sluggish    Slower thoughts or cloudy thinking    Being more sensitive to pain over time; pain is harder to control    Trouble sleeping or restless sleep    Changes in hormone levels (for example, less testosterone)    Changes in sex drive or ability to have sex    Constipation    Unsafe driving    Itching and sweating    Feeling dizzy    Nausea, vomiting and dry mouth    What else should I know about opioids?  When someone takes opioids for too long or too often, they become dependent. This means that if  you stop or reduce the medicine too quickly, you will have withdrawal symptoms.    Dependence is not the same as addiction. Addiction is when people keep using a substance that harms their body, their mind or their relations with others. If you have a history of drug or alcohol abuse, taking opioids can cause a relapse.    Over time, opioids dont work as well. Most people will need higher and higher doses. The higher the dose, the more serious the side effects. We dont know the long-term effects of opioids.      Prescribed opioids aren't the best way to manage chronic pain    Other ways to manage pain include:      Ibuprofen or acetaminophen.  You should always try this first.      Treat health problems that may be causing pain.      acupuncture or massage, deep breathing, meditation, visual imagery, aromatherapy.      Use heat or ice at the pain site      Physical therapy and exercise      Stop smoking      See a counselor or therapist                                                  People who have used opioids for a long time may have a lower quality of life, worse depression, higher levels of pain and more visits to doctors.    Never share your opioids with others. Be sure to store opioids in a secure place, locked if possible.Young children can easily swallow them and overdose.     You can overdose on opioids.  Signs of overdose include decrease or loss of consciousness, slowed breathing, trouble waking and blue lips.  If someone is worried about overdose, they should call 911.    If you are at risk for overdose, you may get naloxone (Narcan, a medicine that reverses the effects of opioids.  If you overdose, a friend or family member can give you Narcan while waiting for the ambulance.  They need to know the signs of overdose and how to give Narcan.    While you're taking opioids:    Don't use alcohol or street drugs. Taking them together can cause death.    Don't take any of these medicines unless your doctor  says its okay.  Taking these with opioids can cause death.    Benzodiazepines (such as lorazepam         or diazepam)    Muscle relaxers (such as cyclobenzaprine)    sleeping pills    other opioids    Safe disposal of opioids  Find your area drug take-back program, your pharmacy mail-back program, buy a special disposal bag (such as Deterra) from your pharmacy or flush them down the toilet.  Use the guidelines at:  www.fda.gov/drugs/resourcesforyou

## 2021-06-20 NOTE — PROGRESS NOTES
"Patient was a no-show for appointment with psychotherapist today, 9/20/18. This was her 2nd attempt to have a DA with this provider. She cancelled her appointment back on 8/20/18. At this time, she has yet to establish care with this provider. Not sure if she is ready for therapy due to 2 failed DA appointment attempts in the past month. PCP is managing her psychotropic medications. PCP visit note stated she has worked with [facility] in the past for mental health treatment, but she had no-shows. PCP recommended she \"Should reconnect with [facility], who has worked with her before. If she needs a referral letter (had no-shows), I will write one.\"     Aleshia MAURO  "

## 2021-06-20 NOTE — LETTER
Letter by Rolanda Encarnacion MD at      Author: Rolanda Encarnacion MD Service: -- Author Type: --    Filed:  Encounter Date: 6/23/2020 Status: (Other)         Rebecca Smalls  [address]  [place] [address]             June 23, 2020         Dear Ms. Smalls,    Below are the results from your recent visit:    Resulted Orders   Glycosylated Hemoglobin A1c   Result Value Ref Range    Hemoglobin A1c 6.5 (H) 3.5 - 6.0 %   Comprehensive Metabolic Panel   Result Value Ref Range    Sodium 141 136 - 145 mmol/L    Potassium 3.6 3.5 - 5.0 mmol/L    Chloride 103 98 - 107 mmol/L    CO2 26 22 - 31 mmol/L    Anion Gap, Calculation 12 5 - 18 mmol/L    Glucose 101 70 - 125 mg/dL    BUN 9 8 - 22 mg/dL    Creatinine 0.79 0.60 - 1.10 mg/dL    GFR MDRD Af Amer >60 >60 mL/min/1.73m2    GFR MDRD Non Af Amer >60 >60 mL/min/1.73m2    Bilirubin, Total 0.6 0.0 - 1.0 mg/dL    Calcium 9.1 8.5 - 10.5 mg/dL    Protein, Total 6.9 6.0 - 8.0 g/dL    Albumin 3.5 3.5 - 5.0 g/dL    Alkaline Phosphatase 111 45 - 120 U/L    AST 20 0 - 40 U/L    ALT 16 0 - 45 U/L    Narrative    Fasting Glucose reference range is 70-99 mg/dL per  American Diabetes Association (ADA) guidelines.   Lipid [facility] FASTING   Result Value Ref Range    Cholesterol 126 <=199 mg/dL    Triglycerides 153 (H) <=149 mg/dL    HDL Cholesterol 33 (L) >=50 mg/dL    LDL Calculated 62 <=129 mg/dL    Patient Fasting > 8hrs? No        Labs are pretty stable.  Blood sugars (A1c) slightly improved from 6.7 to 6.5.  Keep up the good work.     Please call with questions or contact us using [facility].    Sincerely,        Electronically signed by Rolanda Encarnacion MD

## 2021-06-21 NOTE — LETTER
Letter by Aj Conrad MD at      Author: Aj Conrad MD Service: -- Author Type: --    Filed:  Encounter Date: 11/5/2020 Status: (Other)         [facility]  11/05/20    Patient: Rebecca Smalls  YOB: 1965  Medical Record Number: [id number]                                                                  Opioid / Opioid Plus Controlled Substance Agreement    I understand that my care provider has prescribed an opioid (narcotic) controlled substance to help manage my condition(s). I am taking this medicine to help me function or work. I know this is strong medicine, and that it can cause serious side effects. Opioid medicine can be sedating, addicting and may cause a dependency on the drug. They can affect my ability to drive or think, and cause depression. They need to be taken exactly as prescribed. Combining opioids with certain medicines or chemicals (such as cocaine, sedatives and tranquilizers, sleeping pills, meth) can be dangerous or even fatal. Also, if I stop opioids suddenly, I may have severe withdrawal symptoms. Last, I understand that opioids do not work for all types of pain nor for all patients. If not helpful, I may be asked to stop them.    The risks, benefits, and side effects of these medicine(s) were explained to me. I agree that:    1. I will take part in other treatments as advised by my care team. This may be psychiatry or counseling, physical therapy, behavioral therapy, group treatment or a referral to a pain clinic. I will reduce or stop my medicine when my care team tells me to do so.  2. I will take my medicines as prescribed. I will not change the dose or schedule unless my care team tells me to. There will be no refills if I run out early.  I may be contactedwithout warning and asked to complete a urine drug test or pill count at any time.   3. I will keep all my appointments, and understand this is part of the monitoring of opioids. My care  team may require an office visit for EVERY opioid/controlled substance refill. If I miss appointments or dont follow instructions, my care team may stop my medicine.  4. I will not ask other providers to prescribe controlled substances, and I will not accept controlled substances from other people. If I need another prescribed controlled substance for a new reason, I will tell my care team within 1 business day.  5. I will use one pharmacy to fill all of my controlled substance prescriptions, and it is up to me to make sure that I do not run out of my medicines on weekends or holidays. If my care team is willing to refill my opioid prescription without a visit, I must request refills only during office hours, refills may take up to 3 days to process, and it may take up to 5 to 7 days for my medicine to be mailed and ready at my pharmacy. Prescriptions will not be mailed anywhere except my pharmacy.        [id number]  Rev 12/18         Registration to scan to EHR                             Page 1 of 2               Controlled Substance Agreement [facility]  11/05/20  Patient: Rebecca Smalls  YOB: 1965  Medical Record Number: [id number]                                                                  6. I am responsible for my prescriptions. If the medicine/prescription is lost or stolen, it will not be replaced. I also agree not to share controlled substance medicines with anyone.  7. I agree to not use ANY illegal or recreational drugs. This includes marijuana, cocaine, bath salts or other drugs. I agree not to use alcohol unless my care team says I may.          I agree to give urine samples whenever asked. If I dont give a urine sample, the care team may stop my medicine.    8. If I enroll in the [place] Medical Marijuana program, I will tell my care team. I will also sign an agreement to share my medical records with my care team.   9. I will bring in my list of  medicines (or my medicine bottles) each time I come to the clinic.   10. I will tell my care team right away if I become pregnant or have a new medical problem treated outside of my regular clinic.  11. I understand that this medicine can affect my thinking and judgment. It may be unsafe for me to drive, use machinery and do dangerous tasks. I will not do any of these things until I know how the medicine affects me. If my dose changes, I will wait to see how it affects me. I will contact my care team if I have concerns about medicine side effects.    I understand that if I do not follow any of the conditions above, my prescriptions or treatment may be stopped.      I agree that my provider, clinic care team, and pharmacy may work with any city, state or federal law enforcement agency that investigates the misuse, sale, or other diversion of my controlled medicine. I will allow my provider to discuss my care with or share a copy of this agreement with any other treating provider, pharmacy or emergency room where I receive care. I agree to give up (waive) any right of privacy or confidentiality with respect to these consents.     I have read this agreement and have asked questions about anything I did not understand.      ________________________________________________________________________  Patient signature - Date/Time -  Rebecca Smalls                                      ________________________________________________________________________  Witness signature                                                            ________________________________________________________________________  Provider signature - Aj Conrad MD      [id number]  Rev 12/18         Registration to scan to EHR                         Page 2 of 2                   Controlled Substance Agreement Opioid           Page 1 of 2  Opioid Pain Medicines (also known as Narcotics)  What You Need to Know    What are opioids?   Opioids are pain  medicines that must be prescribed by a doctor.  They are also known as narcotics.    Examples are:     morphine (MS Contin, Yue)    oxycodone (Oxycontin)    oxycodone and acetaminophen (Percocet)    hydrocodone and acetaminophen (Vicodin, Norco)     fentanyl patch (Duragesic)     hydromorphone (Dilaudid)     methadone     What do opioids do well?   Opioids are best for short-term pain after a surgery or injury. They also work well for cancer pain. Unlike other pain medicines, they do not cause liver or kidney failure or ulcers. They may help some people with long-lasting (chronic) pain.     What do opioids NOT do well?   Opioids never get rid of pain entirely, and they do not work well for most patients with chronic pain. Opioids do not reduce swelling, one of the causes of pain. They also dont work well for nerve pain.                           For informational purposes only.  Not to replace the advice of your care provider.  Copyright [address] [facility]. All right reserved. [employer] [id number] 02/18.      Page 2 of 2    Risks and side effects   Talk to your doctor before you start or decide to keep taking one of these medicines. Side effects include:    Lowering your breathing rate enough to cause death    Overdose, including death, especially if taking higher than prescribed doses    Long-term opioid use    Worse depression symptoms; less pleasure in things you usually enjoy    Feeling tired or sluggish    Slower thoughts or cloudy thinking    Being more sensitive to pain over time; pain is harder to control    Trouble sleeping or restless sleep    Changes in hormone levels (for example, less testosterone)    Changes in sex drive or ability to have sex    Constipation    Unsafe driving    Itching and sweating    Feeling dizzy    Nausea, vomiting and dry mouth    What else should I know about opioids?  When someone takes opioids for too long or too often, they become dependent. This means that if  you stop or reduce the medicine too quickly, you will have withdrawal symptoms.    Dependence is not the same as addiction. Addiction is when people keep using a substance that harms their body, their mind or their relations with others. If you have a history of drug or alcohol abuse, taking opioids can cause a relapse.    Over time, opioids dont work as well. Most people will need higher and higher doses. The higher the dose, the more serious the side effects. We dont know the long-term effects of opioids.      Prescribed opioids aren't the best way to manage chronic pain    Other ways to manage pain include:      Ibuprofen or acetaminophen.  You should always try this first.      Treat health problems that may be causing pain.      acupuncture or massage, deep breathing, meditation, visual imagery, aromatherapy.      Use heat or ice at the pain site      Physical therapy and exercise      Stop smoking      See a counselor or therapist                                                  People who have used opioids for a long time may have a lower quality of life, worse depression, higher levels of pain and more visits to doctors.    Never share your opioids with others. Be sure to store opioids in a secure place, locked if possible.Young children can easily swallow them and overdose.     You can overdose on opioids.  Signs of overdose include decrease or loss of consciousness, slowed breathing, trouble waking and blue lips.  If someone is worried about overdose, they should call 911.    If you are at risk for overdose, you may get naloxone (Narcan, a medicine that reverses the effects of opioids.  If you overdose, a friend or family member can give you Narcan while waiting for the ambulance.  They need to know the signs of overdose and how to give Narcan.    While you're taking opioids:    Don't use alcohol or street drugs. Taking them together can cause death.    Don't take any of these medicines unless your doctor  says its okay.  Taking these with opioids can cause death.    Benzodiazepines (such as lorazepam         or diazepam)    Muscle relaxers (such as cyclobenzaprine)    sleeping pills    other opioids    Safe disposal of opioids  Find your area drug take-back program, your pharmacy mail-back program, buy a special disposal bag (such as Deterra) from your pharmacy or flush them down the toilet.  Use the guidelines at:  www.fda.gov/drugs/resourcesforyou

## 2021-06-25 NOTE — TELEPHONE ENCOUNTER
Refills ok'd.  She was supposed to follow up for pap but no showed the appt.  Please rec reschedule.

## 2021-06-25 NOTE — TELEPHONE ENCOUNTER
----- Message from Aj Conrad MD sent at 5/30/2021 12:16 PM CDT -----  Please call patient and see if she wants to schedule coronavirus vaccine.

## 2021-06-25 NOTE — TELEPHONE ENCOUNTER
Unable to LM at [phone] due to call not going through . Will help set up a covid vaccine appt with pt, when pt comes into the clinic on 6/08/2021

## 2021-06-25 NOTE — TELEPHONE ENCOUNTER
Reason for Call:  Medication or medication refill:    Do you use a [facility]?  Name of the pharmacy and phone number for the current request: No,  [facility]    Name of the medication requested: HYDROcodone-acetaminophen (NORCO) 7.5-325 mg per tablet and metFORMIN (GLUCOPHAGE) 500 MG tablet    Other request: Patient called to request a refill on these two medications. She is out. Please send RX to pharmacy on file.    Can we leave a detailed message on this number? Yes    Phone number patient can be reached at: Home number on file [phone] (home)    Best Time: any    Call taken on 6/15/2021 at 3:42 PM by Ivelisse Granados

## 2021-06-25 NOTE — TELEPHONE ENCOUNTER
Refill Approved    Rx renewed per Medication Renewal Policy. Medication was last renewed on 2/16/21.    Jarett Connelly, [facility] Triage/Med Refill 6/14/2021     Requested Prescriptions   Pending Prescriptions Disp Refills     DULoxetine (CYMBALTA) 60 MG capsule [Pharmacy Med Name: DULoxetine HCl Oral Capsule Delayed Release Particles 60 MG] 30 capsule 0     Sig: Take 1 capsule by mouth daily. Start after 1 week of decreased venlafaxine dose.       Tricyclics/Misc Antidepressant/Antianxiety Meds Refill Protocol Passed - 6/12/2021  2:01 AM        Passed - PCP or prescribing provider visit in last year     Last office visit with prescriber/PCP: 11/5/2020 Aj Conrad MD OR same dept: 11/5/2020 Aj Conrad MD OR same specialty: 11/5/2020 Aj Conrad MD  Last physical: 5/24/2021 Last MTM visit: Visit date not found   Next visit within 3 mo: Visit date not found  Next physical within 3 mo: Visit date not found  Prescriber OR PCP: Aj Conrad MD  Last diagnosis associated with med order: 1. FOX (generalized anxiety disorder)  - DULoxetine (CYMBALTA) 60 MG capsule [Pharmacy Med Name: DULoxetine HCl Oral Capsule Delayed Release Particles 60 MG]; Take 1 capsule by mouth daily. Start after 1 week of decreased venlafaxine dose.  Dispense: 30 capsule; Refill: 0    2. Moderate single current episode of major depressive disorder (H)  - DULoxetine (CYMBALTA) 60 MG capsule [Pharmacy Med Name: DULoxetine HCl Oral Capsule Delayed Release Particles 60 MG]; Take 1 capsule by mouth daily. Start after 1 week of decreased venlafaxine dose.  Dispense: 30 capsule; Refill: 0    If protocol passes may refill for 12 months if within 3 months of last provider visit (or a total of 15 months).

## 2021-06-25 NOTE — TELEPHONE ENCOUNTER
Controlled Substance Refill Request  Medication Name:   Requested Prescriptions     Pending Prescriptions Disp Refills     metFORMIN (GLUCOPHAGE) 500 MG tablet [Pharmacy Med Name: metFORMIN HCl Oral Tablet 500 MG] 60 tablet 0     Sig: TAKE ONE TABLET BY MOUTH TWICE A DAY WITH MEALS     HYDROcodone-acetaminophen (NORCO) 7.5-325 mg per tablet [Pharmacy Med Name: HYDROcodone-Acetaminophen Oral Tablet 7.5-325 MG] 90 tablet 0     Sig: Take 1 tablet by mouth 3 times a day.     Date Last Fill: 5/16/21  [facility]ed [facility]acy: [facility]  Submit electronically to pharmacy  Controlled Substance Agreement on file:   Encounter-Level CSA Scan Date:    There are no encounter-level csa scan date.        Last office visit:  5/24/21  Becka Ha RN, MA  [facility]    Triage Nurse Advisor

## 2021-06-25 NOTE — TELEPHONE ENCOUNTER
Pt call to follow-up on status of medication request. TC inform pt provider is in a meeting until 1 pm and will send another message. Can provider refill these medications today?

## 2021-07-03 NOTE — ADDENDUM NOTE
Addendum Note by Gianna Hearn CMA at 5/2/2017 10:21 AM     Author: Gianna Hearn CMA Service: -- Author Type: Certified Medical Assistant    Filed: 5/2/2017 10:21 AM Encounter Date: 4/27/2017 Status: Signed    : Gianna Hearn CMA (Certified Medical Assistant)    Addended by: GIANNA HEARN on: 5/2/2017 10:21 AM        Modules accepted: Orders

## 2021-07-03 NOTE — ADDENDUM NOTE
Addendum Note by Koffi Callahan MD at 6/15/2017  4:20 PM     Author: Koffi Callahan MD Service: -- Author Type: Physician    Filed: 6/15/2017  4:20 PM Encounter Date: 6/8/2017 Status: Signed    : Koffi Callahan MD (Physician)    Addended by: KOFFI CALLAHAN on: 6/15/2017 04:20 PM        Modules accepted: Orders

## 2021-07-03 NOTE — ADDENDUM NOTE
Addendum Note by Aj Batista MD at 10/30/2019  1:28 PM     Author: Aj Batista MD Service: -- Author Type: Physician    Filed: 10/30/2019  1:28 PM Encounter Date: 10/29/2019 Status: Signed    : Aj Batista MD (Physician)    Addended by: AJ BATISTA on: 10/30/2019 01:28 PM        Modules accepted: Orders

## 2021-07-03 NOTE — ADDENDUM NOTE
Addendum Note by Aj Batista MD at 5/7/2017  9:52 PM     Author: Aj Batista MD Service: -- Author Type: Physician    Filed: 5/7/2017  9:52 PM Encounter Date: 4/27/2017 Status: Signed    : Aj Batista MD (Physician)    Addended by: AJ BATISTA on: 5/7/2017 09:52 PM        Modules accepted: Orders

## 2021-07-06 VITALS
WEIGHT: 253 LBS | DIASTOLIC BLOOD PRESSURE: 84 MMHG | HEART RATE: 85 BPM | HEIGHT: 67 IN | SYSTOLIC BLOOD PRESSURE: 122 MMHG | BODY MASS INDEX: 39.71 KG/M2

## 2021-07-06 ASSESSMENT — PATIENT HEALTH QUESTIONNAIRE - PHQ9: SUM OF ALL RESPONSES TO PHQ QUESTIONS 1-9: 26

## 2021-07-08 ASSESSMENT — ANXIETY QUESTIONNAIRES: GAD7 TOTAL SCORE: 21

## 2021-07-08 ASSESSMENT — ASTHMA QUESTIONNAIRES: ACT_TOTALSCORE: 13

## 2021-07-14 ENCOUNTER — TELEPHONE (OUTPATIENT)
Dept: FAMILY MEDICINE | Facility: CLINIC | Age: 56
End: 2021-07-14

## 2021-07-14 RX ORDER — HYDROCODONE BITARTRATE AND ACETAMINOPHEN 7.5; 325 MG/1; MG/1
TABLET ORAL
Qty: 90 TABLET | Refills: 0 | Status: SHIPPED | OUTPATIENT
Start: 2021-07-15 | End: 2021-08-19

## 2021-07-14 NOTE — TELEPHONE ENCOUNTER
Reason for Call:  Medication or medication refill:    Do you use a [facility]?  Name of the pharmacy and phone number for the current request:  [facility] on file    Name of the medication requested: HYDROcodone-acetaminophen (NORCO) 7.5-325 mg per tablet    Other request: Patient called to request a refill of this medication. Please send refill to pharmacy on file.    Can we leave a detailed message on this number? YES    Phone number patient can be reached at: Home number on file [phone] (home)    Best Time: any    Call taken on 7/14/2021 at 12:50 PM by Ivelisse Granados

## 2021-07-22 NOTE — PROGRESS NOTES
Assessment and Plan:     Patient has been advised of split billing requirements and indicates understandin. Type 2 diabetes mellitus with microalbuminuria, without long-term current use of insulin (H)  Controlled with metformin.  - Glycosylated Hemoglobin A1c  - Basic Metabolic Panel  - Hepatic Profile  - Lipid [place] FASTING    2. Alcoholism (H)  Remission, not drinking.    3. Morbid obesity (H)  Advised on exercise, knees causing trouble    4. Other insomnia  5. FOX (generalized anxiety disorder)  6. Post traumatic stress disorder (PTSD)  She may be have been taking Cymbalta and venlafaxine.  Discussed stopping venlafaxine as we had previously intended.  Use Cymbalta only.  Patient feeling symptoms not adequately controlled.  She is not actively suicidal and has no plan.  Expresses plans for the future including the trip she is going to take the summer to [facility] and [place].  She would like help with sleep.  Recommended sleep therapist.  - AMB REFERRAL TO MENTAL HEALTH AND ADDICTION  - Adult (18+); Outpatient Treatment; Sleep Psychology; [facility]: [facility])- [id number] [phone]; We will contact you to schedule the appointment or pleas...    7. Encounter for screening for HIV  - HIV Antigen/Antibody Screening [place]    9. Osteoarthrosis involving lower leg  She requested a walker to help with ambulation.  I think that is reasonable given her known osteoarthritis.  - Walker 4 Wheel Walker (with seat)    The patient's current medical problems were reviewed.    Coronavirus vaccine recommended.  Recommended return for Pap smear which she did not want to do today.    The following health maintenance schedule was reviewed with the patient and provided in printed form in the after visit summary:   Health Maintenance   Topic Date Due     ASTHMA ACTION PLAN  Never done     DEPRESSION ACTION PLAN  Never done     DIABETIC EYE EXAM  Never done     COVID-19  Vaccine (1) Never done     HIV SCREENING  Never done     COLORECTAL CANCER SCREENING  Never done     HEPATITIS B VACCINES (1 of 3 - Risk 3-dose series) Never done     ZOSTER VACCINES (1 of 2) Never done     PAP SMEAR  04/22/2020     HPV TEST  04/22/2020     A1C  05/05/2021     BMP  06/22/2021     LIPID  06/22/2021     URINE DRUG SCREEN  11/05/2021     DIABETIC FOOT EXAM  11/05/2021     MICROALBUMIN  11/05/2021     TREATMENT AGREEMENT FOR CHRONIC PAIN MANAGEMENT  11/06/2021     MAMMOGRAM  01/29/2022     Asthma Control Test  05/24/2022     MEDICARE ANNUAL WELLNESS VISIT  05/24/2022     ADVANCE CARE PLANNING  11/12/2024     Pneumococcal Vaccine: Pediatrics (0 to 5 Years) and At-Risk Patients (6 to 64 Years) (2 of 2) 08/02/2028     TD 18+ HE  11/05/2030     HEPATITIS C SCREENING  Completed     INFLUENZA VACCINE RULE BASED  Completed     TDAP ADULT ONE TIME DOSE  Completed       Subjective:   Chief Complaint: Rebecca Smalls is an 55 y.o. female here for an Annual Wellness visit.   HPI:    Having a lot of trouble sleeping.  Cannot fall asleep.  Cannot stay asleep.    Knees hurt.  Request walker.    Plans to travel.  Recommended coronavirus vaccine.    Review of Systems:    Please see above.  The rest of the review of systems are negative for all systems.    PHQ-9:  Little interest or pleasure in doing things: Nearly every day  Feeling down, depressed, or hopeless: Nearly every day  Trouble falling or staying asleep, or sleeping too much: Nearly every day  Feeling tired or having little energy: Nearly every day  Poor appetite or overeating: Nearly every day  Feeling bad about yourself - or that you are a failure or have let yourself or your family down: More than half the days  Trouble concentrating on things, such as reading the newspaper or watching television: Nearly every day  Moving or speaking so slowly that other people could have noticed. Or the opposite - being so fidgety or restless that you have been moving  around a lot more than usual: Nearly every day  Thoughts that you would be better off dead, or of hurting yourself in some way: Nearly every day  PHQ-9 Total Score: 26  If you checked off any problems, how difficult have these problems made it for you to do your work, take care of things at home, or get along with other people?: Extremely dIfficult     Patient Care Team:  Aj Conrad MD as PCP - General  Aj Conrad MD as Assigned PCP     Patient Active Problem List   Diagnosis     Obstructive Sleep Apnea     Hypercholesterolemia     Essential Hypertension     Mild persistent asthma without complication     Osteoarthritis Of The Knee     Alcoholism (H)     FOX (generalized anxiety disorder)     OCD (obsessive compulsive disorder)     Panic disorder without agoraphobia     Severe recurrent major depression (H)     Post traumatic stress disorder (PTSD)     Type 2 diabetes mellitus without complication     Morbid obesity (H)     Chronic, continuous use of opioids     Past Medical History:   Diagnosis Date     Alcoholism (H)      Anxiety      Asthma     Mild Persistent without complication     Biceps tendinitis of left upper extremity     Created by Conversion      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     Created by Conversion      Diabetes mellitus (H)      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     Hypercholesteremia      Hypertension      Obesity      Obsessive compulsive disorder      Os acromiale of left shoulder      TITA (obstructive sleep apnea)      Osteoarthritis     Knee     Panic disorder without agoraphobia      Peroneal nerve palsy     Left Leg     PTSD (post-traumatic stress disorder)      Severe recurrent major depression (H)      Shoulder impingement syndrome, left 09/12/2017     Supraspinatus tendonitis       Past Surgical History:   Procedure Laterality Date     APPENDECTOMY       OOPHORECTOMY Right 1990     [place] LIGATE FALLOPIAN TUBE      Description: Tubal Ligation;   "Recorded: 2011;     TOTAL KNEE ARTHROPLASTY        Family History   Problem Relation Age of Onset     Asthma Son      Diabetes Brother      HIV Nephew         AIDS- nephew (sister's son)  ate 20s; contracted from his wife, had twins-daughter  of AIDS age 8; son o     Liver cancer Sister          age 50; 1997     Breast cancer Sister 50              Heart disease Sister         enlarged heart     Heart disease Brother         enlarged heart     Asthma Other         niece and grandson     Cancer Maternal Aunt         \"went thru whole body..not sure where it started\",  in 30s      Social History     Socioeconomic History     Marital status:      Spouse name: Not on file     Number of children: Not on file     Years of education: Not on file     Highest education level: Not on file   Occupational History     Not on file   Social Needs     Financial resource strain: Not on file     Food insecurity     Worry: Not on file     Inability: Not on file     Transportation needs     Medical: Not on file     Non-medical: Not on file   Tobacco Use     Smoking status: Never Smoker     Smokeless tobacco: Never Used     Tobacco comment: no second hand smoke exposure   Substance and Sexual Activity     Alcohol use: Yes     Comment: rare     Drug use: No     Sexual activity: Not on file   Lifestyle     Physical activity     Days per week: Not on file     Minutes per session: Not on file     Stress: Not on file   Relationships     Social connections     Talks on phone: Not on file     Gets together: Not on file     Attends [employer] service: Not on file     Active member of club or organization: Not on file     Attends meetings of clubs or organizations: Not on file     Relationship status: Not on file     Intimate partner violence     Fear of current or ex partner: Not on file     Emotionally abused: Not on file     Physically abused: Not on file     Forced sexual activity: Not on file   Other " Topics Concern     Not on file   Social History Narrative     Not on file      Current Outpatient Medications   Medication Sig Dispense Refill     albuterol (PROVENTIL) 2.5 mg /3 mL (0.083 %) nebulizer solution NEBULIZE 1 VIAL BY MOUTH EVERY 4 HOURS AS NEEDED FOR WHEEZING. 180 mL 0     amLODIPine (NORVASC) 5 MG tablet Take 1 tablet (5 mg total) by mouth daily. 30 tablet 11     atorvastatin (LIPITOR) 40 MG tablet TAKE ONE TABLET BY MOUTH ONE TIME DAILY  90 tablet 3     cetirizine (ZYRTEC) 10 MG tablet Take 1 tablet (10 mg total) by mouth daily. 90 tablet 1     DULoxetine (CYMBALTA) 60 MG capsule Take 1 capsule by mouth daily. Start after 1 week of decreased venlafaxine dose. 30 capsule 2     fluticasone propionate (FLONASE) 50 mcg/actuation nasal spray 2 sprays into each nostril daily. 10 g 3     hydroCHLOROthiazide (HYDRODIURIL) 25 MG tablet Take 1 tablet (25 mg total) by mouth daily. 90 tablet 3     HYDROcodone-acetaminophen (NORCO) 7.5-325 mg per tablet Take 1 tablet by mouth 3 (three) times a day. 90 tablet 0     hydrOXYzine HCL (ATARAX) 25 MG tablet Take 1 tablet by mouth every 8 hours as needed for itching. 90 tablet 2     metoprolol succinate (TOPROL-XL) 100 MG 24 hr tablet Take 1 tablet by mouth daily 90 tablet 0     SYMBICORT 160-4.5 mcg/actuation inhaler INHALE 2 PUFFS BY MOUTH 2 TIMES A DAY. 1 Inhaler 0     triamcinolone (KENALOG) 0.1 % cream APPLY TOPICALLY TO RASH ON BOTH LEGS TWO TIMES A DAY FOR 2 TO 3 WEEKS. 80 g 0     venlafaxine (EFFEXOR-XR) 150 MG 24 hr capsule Take 1 capsule (150 mg total) by mouth daily. 90 capsule 3     venlafaxine (EFFEXOR-XR) 75 MG 24 hr capsule Take 1 capsule (75 mg total) by mouth daily. 7 capsule 0     VENTOLIN HFA 90 mcg/actuation inhaler INHALE 2 PUFFS BY MOUTH  EVERY 4 HOURS AS NEEDED FOR WHEEZING. 18 g 2     aspirin 325 MG tablet Take 325 mg by mouth.       fluticasone propion-salmeteroL (ADVAIR) 250-50 mcg/dose DISKUS Inhale 1 puff.       ibuprofen (ADVIL,MOTRIN) 800 MG  "tablet Take 800 mg by mouth.       losartan (COZAAR) 100 MG tablet Take 1 tablet (100 mg total) by mouth daily. 90 tablet 3     metFORMIN (GLUCOPHAGE) 500 MG tablet TAKE ONE TABLET BY MOUTH TWICE A DAY WITH MEALS  60 tablet 0     No current facility-administered medications for this visit.       Objective:   Vital Signs:   Visit Vitals  /84 (Patient Site: Right Arm, Patient Position: Sitting, Cuff Size: Thigh)   Pulse 85   Ht 5' 6.75\" (1.695 m)   Wt (!) 253 lb (114.8 kg)   LMP 06/08/2013   BMI 39.92 kg/m           VisionScreening:  No exam data present     PHYSICAL EXAM  deferred    Recent Results (from the past 240 hour(s))   Glycosylated Hemoglobin A1c    Collection Time: 05/24/21 12:21 PM   Result Value Ref Range    Hemoglobin A1c 6.9 (H) <=5.6 %   Basic Metabolic Panel    Collection Time: 05/24/21 12:21 PM   Result Value Ref Range    Sodium 141 136 - 145 mmol/L    Potassium 4.0 3.5 - 5.0 mmol/L    Chloride 104 98 - 107 mmol/L    CO2 24 22 - 31 mmol/L    Anion Gap, Calculation 13 5 - 18 mmol/L    Glucose 164 (H) 70 - 125 mg/dL    Calcium 9.2 8.5 - 10.5 mg/dL    BUN 10 8 - 22 mg/dL    Creatinine 0.76 0.60 - 1.10 mg/dL    GFR MDRD Af Amer >60 >60 mL/min/1.73m2    GFR MDRD Non Af Amer >60 >60 mL/min/1.73m2   Hepatic Profile    Collection Time: 05/24/21 12:21 PM   Result Value Ref Range    Bilirubin, Total 0.8 0.0 - 1.0 mg/dL    Bilirubin, Direct 0.3 <=0.5 mg/dL    Protein, Total 7.0 6.0 - 8.0 g/dL    Albumin 3.7 3.5 - 5.0 g/dL    Alkaline Phosphatase 102 45 - 120 U/L    AST 18 0 - 40 U/L    ALT 15 0 - 45 U/L   Lipid [place] FASTING    Collection Time: 05/24/21 12:21 PM   Result Value Ref Range    Cholesterol 135 <=199 mg/dL    Triglycerides 190 (H) <=149 mg/dL    HDL Cholesterol 38 (L) >=50 mg/dL    LDL Calculated 59 <=129 mg/dL    Patient Fasting > 8hrs? No    HIV Antigen/Antibody Screening [place]    Collection Time: 05/24/21 12:21 PM   Result Value Ref Range    HIV Antigen / Antibody Negative Negative    "     Assessment Results 11/12/2019   Activities of Daily Living 2-4 - Moderate impairment   Instrumental Activities of Daily Living 2-4 - Moderate impairment   Get Up and Go Score Less than 12 seconds   Mini Cog Total Score 2   Some recent data might be hidden     A Mini-Cog score of 0-2 suggests the possibility of dementia, score of 3-5 suggests no dementia    Identified Health Risks:     The patient was provided with suggestions to help her develop a healthy physical lifestyle.     The patient reports that she drinks more than one alcoholic drink per day but denies binge or excessive drinking. She was counseled and given information about possible harmful effects of excessive alcohol intake.    The patient reports that she does not have all recommended working emergency equipment available. She was provided with information about emergency preparedness, including smoke detectors.    The patient reports that she has difficulty with activities of daily living.  The patient reports that she has difficulty with instrumental activities of daily living.  She was provided with a list of local organizations that provide support services.    The patient was provided with written information regarding signs of hearing loss.    The patient was provided with suggestions to help her develop a healthy emotional lifestyle.     The patient's PHQ-9 score is consistent with severe depression.  She was provided with information regarding depression and suicide prevention and was advised to schedule a follow up appointment.    She is at risk for falling and has been provided with information to reduce the risk of falling at home.    Information regarding advance directives (living ch), including where she can download the appropriate form, was provided to the patient via the AVS.     The patient was provided with appropriate referrals to address her memory problem.

## 2021-08-06 NOTE — PATIENT INSTRUCTIONS - HE
Patient Instructions by Aj Conrad MD at 11/12/2019  1:30 PM     Author: Aj Conrad MD Service: -- Author Type: Physician    Filed: 11/12/2019  1:40 PM Encounter Date: 11/12/2019 Status: Signed    : Aj Conrad MD (Physician)         Patient Education     Your Health Risk Assessment indicates you feel you are not in good physical health.    A healthy lifestyle helps keep the body fit and the mind alert. It helps protect you from disease, helps you fight disease, and helps prevent chronic disease (disease that doesn't go away) from getting worse. This is important as you get older and begin to notice twinges in muscles and joints and a decline in the strength and stamina you once took for granted. A healthy lifestyle includes good healthcare, good nutrition, weight control, recreation, and regular exercise. Avoid harmful substances and do what you can to keep safe. Another part of a healthy lifestyle is stay mentally active and socially involved.    Good healthcare     Have a wellness visit every year.     If you have new symptoms, let us know right away. Don't wait until the next checkup.     Take medicines exactly as prescribed and keep your medicines in a safe place. Tell us if your medicine causes problems.   Healthy diet and weight control     Eat 3 or 4 small, nutritious, low-fat, high-fiber meals a day. Include a variety of fruits, vegetables, and whole-grain foods.     Make sure you get enough calcium in your diet. Calcium, vitamin D, and exercise help prevent osteoporosis (bone thinning).     If you live alone, try eating with others when you can. That way you get a good meal and have company while you eat it.     Try to keep a healthy weight. If you eat more calories than your body uses for energy, it will be stored as fat and you will gain weight.     Recreation   Recreation is not limited to sports and team events. It includes any activity that provides relaxation, interest,  enjoyment, and exercise. Recreation provides an outlet for physical, mental, and social energy. It can give a sense of worth and achievement. It can help you stay healthy.       Patient Education   Alcohol Use   Many people can enjoy a glass of wine or beer without any negative consequences to their health. According to the Centers for Disease Control and Prevention (CDC), having one or fewer drinks per day for women and two or fewer per day for men is considered moderate drinking.     When people drink more than moderately, it can become concerning. Excessive drinking is defined as consuming 15 drinks or more per week for men and 8 drinks or more per week for women. There are various health problems associated with excessive drinking, which include:    Damage to vital organs like the heart, brain, liver and pancreas    Harm to the digestive tract    Weaken the immune system    Higher risk for heart disease and cancer       Patient Education    Home Fire Safety  Each year, thousands of people, including children, are injured and killed in home fires. Children are often curious about fire, and may not understand the dangers. This makes home fire safety practices especially important. Three important things you can do to keep your home safe from fire are:    Install smoke alarms in your home and make sure they work properly.    Teach children not to play with matches, lighters, and other materials that can be used to start fires. And keep these materials out of childrens reach.    Teach children what to do in case of fire. Create a fire safety action plan and practice it.  Read on for more details about keeping your family and home safe from fire.        Being Prepared for a Fire  A home fire can happen at any time. The following can help you be prepared:    Install smoke alarms on every level of your home, including the basement and outside all sleeping areas. This simple step cuts your familys risk of dying in a  fire nearly in half.    Test smoke alarms monthly, and change the batteries once a year or when the alarm chirps.    Dont disable smoke alarms, even for a short time.    Ask your local fire department for tips on where to place smoke alarms in your home.    Replace all smoke alarms every 10 years.    Consider using voice smoke alarms. These alarms allow you to substitute your own voice for the alarm sound. They are helpful because many children dont wake up to the sound of a regular smoke alarm.    Install carbon monoxide detectors near sleeping areas.    Be aware that carbon monoxide is a byproduct of smoke that can be deadly. Its a gas that you cant see, smell, or taste.    Consider buying a combination smoke alarm / carbon monoxide detector.    Keep fire extinguishers in the home.    Keep them in accessible locations, especially in the kitchen.    Check usage dates to make sure they are not .    Use fire extinguishers only when the fire is in a contained area and is not spreading. (Otherwise, you should focus on getting out of the home.)    Train adults to use fire extinguishers. (Children should focus on getting out of the home during a fire.)    If you live in an apartment, talk to your landlord about where smoke alarms are and how often they are tested. Also ask about fire extinguisher locations and emergency exit routes.  Indoor Fire Safety  Many things in your home are potential fire hazards. Follow these steps to help keep your home safe.    Be careful in the kitchen.    Never leave food thats cooking unattended.    If a fire breaks out in a cooking pan, put a lid on it to smother it. And never throw water on a grease fire. It will make the fire worse.    Conduct a home safety inspection. Look for anything, such as frayed wires and cords, that can cause a fire. Fix or remove any fire safety hazards you find.    Keep all matches and lighters in a secured drawer or cabinet out of the reach of children.  Use childproof lighters.    Check to make sure all appliances, including the stove, are turned off before leaving the home.    Know where the gas main shut-off is located.    Make sure space heaters are stable and have protective covers. Keep them at least 3 feet from anything that can burn, such as curtains. Dont use space heaters in areas where young kids spend time alone.    Keep flammable liquids such as kerosene and gasoline locked up and safely stored away from kids and heat.    Keep all smoking materials out of reach of children. And never smoke in bed. If possible, smoke outdoors only.  Outdoor Fire Safety  Fire can be a hazard outdoors as well as indoors. When outdoors, be sure to do the following:    Always supervise kids near a barbecue grill, campfire, or portable stove.    Dont use fire pits around children. Kids can fall into them, and pits can be hot even after the fire goes out.    Keep a garden hose or fire extinguisher handy when cooking outdoors in case of fire.  Teaching Your Child About Fire Safety  One of the best ways to keep your home safe from fire is education. Make sure everyone in your family knows fire safety rules, including children.    Teach your children the dangers of matches, lighters, and other dangerous items.    Teach them to never touch these and other objects that are hot, such as candles.    Have them tell you right away whenever they find matches or lighters. Explain that these items are tools for grown-ups, not toys. And never amuse children with matches or lighters.    Round up all matches and lighters and store them safely. In case you missed some, ask your children to tell you where any are located throughout your home.    Never leave a child alone in a room with a lit candle. Dont allow teens to have candles in their rooms.    Show children what to do in case of fire.    Be sure your kids know what the fire alarm sounds like and what to do if it goes off.    Teach kids  what to do if their clothes catch fire: Stop, Drop to the ground, and Roll until the fire is put out. They should also cover their face with their hands. Practice these steps with your children. Make sure they understand that running will make the fire burn faster.    Show children how to crawl below smoke during a fire.    Make sure kids know at least two escape routes from each room in the home. These escape routes can be windows.    Teach kids to test doors for nearby fire by feeling for heat with the back of their hand. If the door is warm or hot, they should try their second exit.    Explain to children that they cant hide from a fire. Hiding in a closet or under a bed wont make them safe. Instead, they should try to escape the home. And if they cant escape, they should let others know they are trapped. They can do this by shutting the door to the room, opening a window, and turning on the lights.    Talk to your local fire department.    Introduce your children to a . Let them know that firefighters will look different when in full protective gear. Tell them to never hide from firefighters, and to follow all directions from firefighters during a fire.    Find out if the fire department has a fire safety program for kids.      Create a Fire Safety Plan  Create a plan for your family to follow in case of a fire. Try making it a family project. Important steps for the plan include leaving the home right away and having a designated meeting place.    Make sure your child understands to get out and stay out. He or she should get out of the home immediately and not go back in, even if family members or pets are still inside.    Decide on a safe meeting place away from the home for everyone to gather.    Teach children to call 911 or emergency services from a cell phone or neighbors phone. Make sure they know to do this only after they are safely out of the home.    Teach your children the fire safety  plan. Practice it and make sure they understand it.    Have fire drills twice a year to keep your children prepared in case of fire.    Visit the National Fire Protection Association web site at www.nfpa.org for more information.      5219-2723 [employer]. [address] [place] [address], [place] [address]. All rights reserved. This information is not intended as a substitute for professional medical care. Always follow your healthcare professional's instructions.         Patient Education   Activities of Daily Living  Your Health Risk Assessment indicates you have difficulties with activities of daily living such as eating, getting dressed, grooming, bathing, walking, or using the toilet. Please make a follow up appointment for us to address this issue in more detail.     Patient Education   Instrumental Activities of Daily Living  Your Health Risk Assessment indicates you have difficulties with instrumental activities of daily living which include laundry, housekeeping, banking, shopping, using the telephone, food preparation, transportation, or taking your own medications. Please make a follow up appointment for us to address this issue in more detail.    [employer] has resources available on the following website: [email or web address]     Also, here is a local agency that provides help with meals and other assistance:   [facility] Line: [phone]     Patient Education   Signs of Hearing Loss  Hearing loss is a problem shared by many people. In fact, it is one of the most common health conditions, particularly as people age. Most people over age 65 have some hearing loss, and by age 80, almost everyone does. Because hearing loss usually occurs slowly over the years, you may not realize your hearing ability has gotten worse.       Have your hearing checked  Contact your [facility] care provider if you:    Have to strain to hear normal conversation.    Have to watch other peoples faces  very carefully to follow what theyre saying.    Need to ask people to repeat what theyve said.    Often misunderstand what people are saying.    Turn the volume of the television or radio up so high that others complain.    Feel that people are mumbling when theyre talking to you.    Find that the effort to hear leaves you feeling tired and irritated.    Notice, when using the phone, that you hear better with 1 ear than the other.    4573-9534 [employer]. [address] [place] [address]. All rights reserved. This information is not intended as a substitute for professional medical care. Always follow your healthcare professional's instructions.         Patient Education   Your Health Risk Assessment indicates you feel you are not in good emotional health.    Recreation   Recreation is not limited to sports and team events. It includes any activity that provides relaxation, interest, enjoyment, and exercise. Recreation provides an outlet for physical, mental, and social energy. It can give a sense of worth and achievement. It can help you stay healthy.    Mental Exercise and Social Involvement  Mental and emotional health is as important as physical health. Keep in touch with friends and family. Stay as active as possible. Continue to learn and challenge yourself.   Things you can do to stay mentally active are:    Learn something new, like a foreign language or musical instrument.     Play SCRABBLE or do crossword puzzles. If you cannot find people to play these games with you at home, you can play them with others on your computer through the Internet.     Join a games club--anything from card games to chess or checkers or lawn bowling.     Start a new hobby.     Go back to school.     Volunteer.     Read.     Keep up with world events.       Patient Education   Depression and Suicide in Older Adults  Nearly 2 million older Americans have some type of depression. Sadly, some of them even  take their own lives. Yet depression among older adults is often ignored. Learn the warning signs. You may help spare a loved one needless pain. You may also save a life.       What Is Depression?  Depression is a mood disorder that affects the way you think and feel. The most common symptom is a feeling of deep sadness. People who are depressed also may seem tired and listless. And nothing seems to give them pleasure. Its normal to grieve or be sad sometimes. But sadness lessens or passes with time. Depression rarely goes away or improves on its own. Other symptoms of depression are:    Sleeping more or less than normal    Eating more or less than normal    Having headaches, stomachaches, or other pains that dont go away    Feeling nervous, empty, or worthless    Crying a great deal    Thinking or talking about suicide or death    Feeling confused or forgetful  What Causes It?  The causes of depression arent fully known. Certain chemicals in the brain play a role. Depression does run in families. And life stresses can also trigger depression in some people. That may be the case with older adults. They often face great burdens, such as the death of friends or a spouse. They may have failing health. And they are more likely to be alone, lonely, or poor.  How You Can Help  Often, depressed people may not want to ask for help. When they do, they may be ignored. Or, they may receive the wrong treatment. You can help by showing parents and older friends love and support. If they seem depressed, help them find the right treatment. Talk to your doctor. Or contact a local mental health center, social service agency, or hospital. With modern treatment, no one has to suffer from depression.  Resources:    National [place] of Mental Health  284.355.5244  www.nimh.nih.gov    National [place] on Mental Illness  263.893.1186  www.bora.org    Mental Health [facility]  746.375.8507  www.nmha.org    National Suicide  Hotline  191.653.2311 (792-ZGVWOGH)      7714-7985 [employer]. [address] [place] [address]. All rights reserved. This information is not intended as a substitute for professional medical care. Always follow your healthcare professional's instructions.         Patient Education   Preventing Falls in the Home  As you get older, falls are more likely. Thats because your reaction time slows. Your muscles and joints may also get stiffer, making them less flexible. Illness, medications, and vision changes can also affect your balance. A fall could leave you unable to live on your own. To make your home safer, follow these tips:    Floors    Put nonskid pads under area rugs.    Remove throw rugs.    Replace worn floor coverings.    Tack carpets firmly to each step on carpeted stairs. Put nonskid strips on the edges of uncarpeted stairs.    Keep floors and stairs free of clutter and cords.    Arrange furniture so there are clear pathways.    Clean up any spills right away.    Bathrooms    Install grab bars in the tub or shower.    Apply nonskid strips or put a nonskid rubber mat in the tub or shower.    Sit on a bath chair to bathe.    Use bathmats with nonskid backing.    Lighting    Keep a flashlight in each room.    Put a nightlight along the pathway between the bedroom and the bathroom.    9865-3618 [employer]. [address] [place] [address]. All rights reserved. This information is not intended as a substitute for professional medical care. Always follow your healthcare professional's instructions.         Patient Education   Understanding Advance Care Planning  Advance care planning is the process of deciding ones own future medical care. It helps ensure that if you cant speak for yourself, your wishes can still be carried out. The plan is a series of legal documents that note a persons wishes. The documents vary by state. Advance care planning may be done when a  person has a serious illness that is expected to get worse. It may be done before major surgery. And it can help you and your family be prepared in case of a major illness or injury. Advance care planning helps with making decisions at these times.       A health care proxy is a person who acts as the voice of a patient when the patient cant speak for himself or herself. The name of this role varies by state. It may be called a Durable Medical Power of  or Durable Power of  for Healthcare. It may be called an agent, surrogate, or advocate. Or it may be called a representative or decision maker. It is an official duty that is identified by a legal document. The document also varies by state.    Why Is Advance Care Planning Important?  If a person communicates their healthcare wishes:    They will be given medical care that matches their values and goals.    Their family members will not be forced to make decisions in a crisis with no guidance.  Creating a Plan  Making an advance care plan is often done in 3 steps:    Thinking about ones wishes. To create an advance care plan, you should think about what kind of medical treatment you would want if you lose the ability to communicate. Are there any situations in which you would refuse or stop treatment? Are there therapies you would want or not want? And whom do you want to make decisions for you? There are many places to learn more about how to plan for your care. Ask your doctor or  for resources.    Picking a health care proxy. This means choosing a trusted person to speak for you only when you cant speak for yourself. When you cannot make medical decisions, your proxy makes sure the instructions in your advance care plan are followed. A proxy does not make decisions based on his or her own opinions. They must put aside those opinions and values if needed, and carry out your wishes.    Filling out the legal documents. There are several  kinds of legal documents for advance care planning. Each one tells health care providers your wishes. The documents may vary by state. They must be signed and may need to be witnessed or notarized. You can cancel or change them whenever you wish. Depending on your state, the documents may include a Healthcare Proxy form, Living Will, Durable Medical Power of , Advance Directive, or others.  The Familys Role  The best help a family can give is to support their loved ones wishes. Open and honest communication is vital. Family should express any concerns they have about the patients choices while the patient can still make decisions.    8539-8192 [employer]. [address] [place] [address]. All rights reserved. This information is not intended as a substitute for professional medical care. Always follow your healthcare professional's instructions.         Also, [facility] [place] offers a free, downloadable health care directive that allows you to share your treatment choices and personal preferences if you cannot communicate your wishes. It also allows you to appoint another person (called a health care agent) to make health care decisions if you are unable to do so. You can download an advance directive by going here: [email or web address]     Patient Education   Personalized Prevention Plan  You are due for the preventive services outlined below.  Your care team is available to assist you in scheduling these services.  If you have already completed any of these items, please share that information with your care team to update in your medical record.  Health Maintenance   Topic Date Due   ? ASTHMA ACTION PLAN  1965   ? HEPATITIS C SCREENING  1965   ? DIABETIC EYE EXAM  1965   ? HIV SCREENING  08/02/1978   ? COLONOSCOPY  08/02/2013   ? ZOSTER VACCINES (1 of 2) 08/02/2013   ? MEDICARE ANNUAL WELLNESS VISIT  07/19/2019   ? LIPID   07/19/2019   ? INFLUENZA VACCINE RULE BASED (1) 08/01/2019   ? PAP SMEAR  04/22/2020   ? HPV TEST  04/22/2020   ? TD 18+ HE  10/18/2020   ? DEPRESSION FOLLOW UP  11/28/2019   ? A1C  11/28/2019   ? MAMMOGRAM  04/16/2020   ? URINE DRUG SCREEN  05/28/2020   ? BMP  05/28/2020   ? DIABETIC FOOT EXAM  05/28/2020   ? MICROALBUMIN  05/28/2020   ? TREATMENT AGREEMENT FOR CHRONIC PAIN MANAGEMENT  06/05/2020   ? ASTHMA CONTROL TEST  11/12/2020   ? ADVANCE CARE PLANNING  07/19/2023   ? PNEUMOCOCCAL IMMUNIZATION 19-64 MEDIUM RISK  Completed   ? TDAP ADULT ONE TIME DOSE  Completed

## 2021-08-06 NOTE — PATIENT INSTRUCTIONS - HE
Patient Instructions by Aj Conrad MD at 5/24/2021 11:20 AM     Author: Aj Conrad MD Service: -- Author Type: Physician    Filed: 5/24/2021 12:04 PM Encounter Date: 5/24/2021 Status: Signed    : Aj Conrad MD (Physician)         Patient Education     Your Health Risk Assessment indicates you feel you are not in good physical health.    A healthy lifestyle helps keep the body fit and the mind alert. It helps protect you from disease, helps you fight disease, and helps prevent chronic disease (disease that doesn't go away) from getting worse. This is important as you get older and begin to notice twinges in muscles and joints and a decline in the strength and stamina you once took for granted. A healthy lifestyle includes good healthcare, good nutrition, weight control, recreation, and regular exercise. Avoid harmful substances and do what you can to keep safe. Another part of a healthy lifestyle is stay mentally active and socially involved.    Good healthcare     Have a wellness visit every year.     If you have new symptoms, let us know right away. Don't wait until the next checkup.     Take medicines exactly as prescribed and keep your medicines in a safe place. Tell us if your medicine causes problems.   Healthy diet and weight control     Eat 3 or 4 small, nutritious, low-fat, high-fiber meals a day. Include a variety of fruits, vegetables, and whole-grain foods.     Make sure you get enough calcium in your diet. Calcium, vitamin D, and exercise help prevent osteoporosis (bone thinning).     If you live alone, try eating with others when you can. That way you get a good meal and have company while you eat it.     Try to keep a healthy weight. If you eat more calories than your body uses for energy, it will be stored as fat and you will gain weight.     Recreation   Recreation is not limited to sports and team events. It includes any activity that provides relaxation, interest, enjoyment,  and exercise. Recreation provides an outlet for physical, mental, and social energy. It can give a sense of worth and achievement. It can help you stay healthy.       Patient Education   Alcohol Use   Many people can enjoy a glass of wine or beer without any negative consequences to their health. According to the Centers for Disease Control and Prevention (CDC), having one or fewer drinks per day for women and two or fewer per day for men is considered moderate drinking.     When people drink more than moderately, it can become concerning. Excessive drinking is defined as consuming 15 drinks or more per week for men and 8 drinks or more per week for women. There are various health problems associated with excessive drinking, which include:    Damage to vital organs like the heart, brain, liver and pancreas    Harm to the digestive tract    Weaken the immune system    Higher risk for heart disease and cancer       Patient Education    Home Fire Safety  Each year, thousands of people, including children, are injured and killed in home fires. Children are often curious about fire, and may not understand the dangers. This makes home fire safety practices especially important. Three important things you can do to keep your home safe from fire are:    Install smoke alarms in your home and make sure they work properly.    Teach children not to play with matches, lighters, and other materials that can be used to start fires. And keep these materials out of childrens reach.    Teach children what to do in case of fire. Create a fire safety action plan and practice it.  Read on for more details about keeping your family and home safe from fire.        Being Prepared for a Fire  A home fire can happen at any time. The following can help you be prepared:    Install smoke alarms on every level of your home, including the basement and outside all sleeping areas. This simple step cuts your familys risk of dying in a fire nearly in  half.    Test smoke alarms monthly, and change the batteries once a year or when the alarm chirps.    Dont disable smoke alarms, even for a short time.    Ask your local fire department for tips on where to place smoke alarms in your home.    Replace all smoke alarms every 10 years.    Consider using voice smoke alarms. These alarms allow you to substitute your own voice for the alarm sound. They are helpful because many children dont wake up to the sound of a regular smoke alarm.    Install carbon monoxide detectors near sleeping areas.    Be aware that carbon monoxide is a byproduct of smoke that can be deadly. Its a gas that you cant see, smell, or taste.    Consider buying a combination smoke alarm / carbon monoxide detector.    Keep fire extinguishers in the home.    Keep them in accessible locations, especially in the kitchen.    Check usage dates to make sure they are not .    Use fire extinguishers only when the fire is in a contained area and is not spreading. (Otherwise, you should focus on getting out of the home.)    Train adults to use fire extinguishers. (Children should focus on getting out of the home during a fire.)    If you live in an apartment, talk to your landlord about where smoke alarms are and how often they are tested. Also ask about fire extinguisher locations and emergency exit routes.  Indoor Fire Safety  Many things in your home are potential fire hazards. Follow these steps to help keep your home safe.    Be careful in the kitchen.    Never leave food thats cooking unattended.    If a fire breaks out in a cooking pan, put a lid on it to smother it. And never throw water on a grease fire. It will make the fire worse.    Conduct a home safety inspection. Look for anything, such as frayed wires and cords, that can cause a fire. Fix or remove any fire safety hazards you find.    Keep all matches and lighters in a secured drawer or cabinet out of the reach of children. Use childproof  lighters.    Check to make sure all appliances, including the stove, are turned off before leaving the home.    Know where the gas main shut-off is located.    Make sure space heaters are stable and have protective covers. Keep them at least 3 feet from anything that can burn, such as curtains. Dont use space heaters in areas where young kids spend time alone.    Keep flammable liquids such as kerosene and gasoline locked up and safely stored away from kids and heat.    Keep all smoking materials out of reach of children. And never smoke in bed. If possible, smoke outdoors only.  Outdoor Fire Safety  Fire can be a hazard outdoors as well as indoors. When outdoors, be sure to do the following:    Always supervise kids near a barbecue grill, campfire, or portable stove.    Dont use fire pits around children. Kids can fall into them, and pits can be hot even after the fire goes out.    Keep a garden hose or fire extinguisher handy when cooking outdoors in case of fire.  Teaching Your Child About Fire Safety  One of the best ways to keep your home safe from fire is education. Make sure everyone in your family knows fire safety rules, including children.    Teach your children the dangers of matches, lighters, and other dangerous items.    Teach them to never touch these and other objects that are hot, such as candles.    Have them tell you right away whenever they find matches or lighters. Explain that these items are tools for grown-ups, not toys. And never amuse children with matches or lighters.    Round up all matches and lighters and store them safely. In case you missed some, ask your children to tell you where any are located throughout your home.    Never leave a child alone in a room with a lit candle. Dont allow teens to have candles in their rooms.    Show children what to do in case of fire.    Be sure your kids know what the fire alarm sounds like and what to do if it goes off.    Teach kids what to do if  their clothes catch fire: Stop, Drop to the ground, and Roll until the fire is put out. They should also cover their face with their hands. Practice these steps with your children. Make sure they understand that running will make the fire burn faster.    Show children how to crawl below smoke during a fire.    Make sure kids know at least two escape routes from each room in the home. These escape routes can be windows.    Teach kids to test doors for nearby fire by feeling for heat with the back of their hand. If the door is warm or hot, they should try their second exit.    Explain to children that they cant hide from a fire. Hiding in a closet or under a bed wont make them safe. Instead, they should try to escape the home. And if they cant escape, they should let others know they are trapped. They can do this by shutting the door to the room, opening a window, and turning on the lights.    Talk to your local fire department.    Introduce your children to a . Let them know that firefighters will look different when in full protective gear. Tell them to never hide from firefighters, and to follow all directions from firefighters during a fire.    Find out if the fire department has a fire safety program for kids.      Create a Fire Safety Plan  Create a plan for your family to follow in case of a fire. Try making it a family project. Important steps for the plan include leaving the home right away and having a designated meeting place.    Make sure your child understands to get out and stay out. He or she should get out of the home immediately and not go back in, even if family members or pets are still inside.    Decide on a safe meeting place away from the home for everyone to gather.    Teach children to call 911 or emergency services from a cell phone or neighbors phone. Make sure they know to do this only after they are safely out of the home.    Teach your children the fire safety plan. Practice it  and make sure they understand it.    Have fire drills twice a year to keep your children prepared in case of fire.    Visit the National Fire Protection Association web site at www.nfpa.org for more information.      0216-4892 [employer] [address] [place] [address], [place] [address]. All rights reserved. This information is not intended as a substitute for professional medical care. Always follow your healthcare professional's instructions.         Patient Education   Activities of Daily Living  Your Health Risk Assessment indicates you have difficulties with activities of daily living such as eating, getting dressed, grooming, bathing, walking, or using the toilet. Please make a follow up appointment for us to address this issue in more detail.     Patient Education   Instrumental Activities of Daily Living  Your Health Risk Assessment indicates you have difficulties with instrumental activities of daily living which include laundry, housekeeping, banking, shopping, using the telephone, food preparation, transportation, or taking your own medications. Please make a follow up appointment for us to address this issue in more detail.    [employer] has resources available on the following website: [email or web address]     Also, here is a local agency that provides help with meals and other assistance:   [facility] Line: 631.745.5812     Patient Education   Signs of Hearing Loss  Hearing loss is a problem shared by many people. In fact, it is one of the most common health conditions, particularly as people age. Most people over age 65 have some hearing loss, and by age 80, almost everyone does. Because hearing loss usually occurs slowly over the years, you may not realize your hearing ability has gotten worse.       Have your hearing checked  Contact your [facility] care provider if you:    Have to strain to hear normal conversation.    Have to watch other peoples faces very carefully to  follow what theyre saying.    Need to ask people to repeat what theyve said.    Often misunderstand what people are saying.    Turn the volume of the television or radio up so high that others complain.    Feel that people are mumbling when theyre talking to you.    Find that the effort to hear leaves you feeling tired and irritated.    Notice, when using the phone, that you hear better with 1 ear than the other.    6618-5371 [employer]. [address] [place] [address], [place] [address]. All rights reserved. This information is not intended as a substitute for professional medical care. Always follow your healthcare professional's instructions.         Patient Education   Your Health Risk Assessment indicates you feel you are not in good emotional health.    Recreation   Recreation is not limited to sports and team events. It includes any activity that provides relaxation, interest, enjoyment, and exercise. Recreation provides an outlet for physical, mental, and social energy. It can give a sense of worth and achievement. It can help you stay healthy.    Mental Exercise and Social Involvement  Mental and emotional health is as important as physical health. Keep in touch with friends and family. Stay as active as possible. Continue to learn and challenge yourself.   Things you can do to stay mentally active are:    Learn something new, like a foreign language or musical instrument.     Play SCRABBLE or do crossword puzzles. If you cannot find people to play these games with you at home, you can play them with others on your computer through the Internet.     Join a games club--anything from card games to chess or checkers or lawn bowling.     Start a new hobby.     Go back to school.     Volunteer.     Read.     Keep up with world events.       Patient Education   Depression and Suicide in Older Adults  Nearly 2 million older Americans have some type of depression. Sadly, some of them even take their own  lives. Yet depression among older adults is often ignored. Learn the warning signs. You may help spare a loved one needless pain. You may also save a life.       What Is Depression?  Depression is a mood disorder that affects the way you think and feel. The most common symptom is a feeling of deep sadness. People who are depressed also may seem tired and listless. And nothing seems to give them pleasure. Its normal to grieve or be sad sometimes. But sadness lessens or passes with time. Depression rarely goes away or improves on its own. Other symptoms of depression are:    Sleeping more or less than normal    Eating more or less than normal    Having headaches, stomachaches, or other pains that dont go away    Feeling nervous, empty, or worthless    Crying a great deal    Thinking or talking about suicide or death    Feeling confused or forgetful  What Causes It?  The causes of depression arent fully known. Certain chemicals in the brain play a role. Depression does run in families. And life stresses can also trigger depression in some people. That may be the case with older adults. They often face great burdens, such as the death of friends or a spouse. They may have failing health. And they are more likely to be alone, lonely, or poor.  How You Can Help  Often, depressed people may not want to ask for help. When they do, they may be ignored. Or, they may receive the wrong treatment. You can help by showing parents and older friends love and support. If they seem depressed, help them find the right treatment. Talk to your doctor. Or contact a local mental health center, social service agency, or hospital. With modern treatment, no one has to suffer from depression.  Resources:    National [place] of Mental Health  653.860.5671  www.nimh.nih.gov    National [place] on Mental Illness  462.212.8009  www.bora.org    Mental Health [facility]  755.952.9795  www.nmha.org    National Suicide Hotline  958.627.9710  (073-SUICIDE)      6874-8600 [employer]. [address] [place] [address]. All rights reserved. This information is not intended as a substitute for professional medical care. Always follow your healthcare professional's instructions.         Patient Education   Preventing Falls in the Home  As you get older, falls are more likely. Thats because your reaction time slows. Your muscles and joints may also get stiffer, making them less flexible. Illness, medications, and vision changes can also affect your balance. A fall could leave you unable to live on your own. To make your home safer, follow these tips:    Floors    Put nonskid pads under area rugs.    Remove throw rugs.    Replace worn floor coverings.    Tack carpets firmly to each step on carpeted stairs. Put nonskid strips on the edges of uncarpeted stairs.    Keep floors and stairs free of clutter and cords.    Arrange furniture so there are clear pathways.    Clean up any spills right away.    Bathrooms    Install grab bars in the tub or shower.    Apply nonskid strips or put a nonskid rubber mat in the tub or shower.    Sit on a bath chair to bathe.    Use bathmats with nonskid backing.    Lighting    Keep a flashlight in each room.    Put a nightlight along the pathway between the bedroom and the bathroom.    0116-3720 [employer]. [address] [place] [address]. All rights reserved. This information is not intended as a substitute for professional medical care. Always follow your healthcare professional's instructions.         Patient Education   Understanding Advance Care Planning  Advance care planning is the process of deciding ones own future medical care. It helps ensure that if you cant speak for yourself, your wishes can still be carried out. The plan is a series of legal documents that note a persons wishes. The documents vary by state. Advance care planning may be done when a person has a serious  illness that is expected to get worse. It may be done before major surgery. And it can help you and your family be prepared in case of a major illness or injury. Advance care planning helps with making decisions at these times.       A health care proxy is a person who acts as the voice of a patient when the patient cant speak for himself or herself. The name of this role varies by state. It may be called a Durable Medical Power of  or Durable Power of  for Healthcare. It may be called an agent, surrogate, or advocate. Or it may be called a representative or decision maker. It is an official duty that is identified by a legal document. The document also varies by state.    Why Is Advance Care Planning Important?  If a person communicates their healthcare wishes:    They will be given medical care that matches their values and goals.    Their family members will not be forced to make decisions in a crisis with no guidance.  Creating a Plan  Making an advance care plan is often done in 3 steps:    Thinking about ones wishes. To create an advance care plan, you should think about what kind of medical treatment you would want if you lose the ability to communicate. Are there any situations in which you would refuse or stop treatment? Are there therapies you would want or not want? And whom do you want to make decisions for you? There are many places to learn more about how to plan for your care. Ask your doctor or  for resources.    Picking a health care proxy. This means choosing a trusted person to speak for you only when you cant speak for yourself. When you cannot make medical decisions, your proxy makes sure the instructions in your advance care plan are followed. A proxy does not make decisions based on his or her own opinions. They must put aside those opinions and values if needed, and carry out your wishes.    Filling out the legal documents. There are several kinds of legal  documents for advance care planning. Each one tells health care providers your wishes. The documents may vary by state. They must be signed and may need to be witnessed or notarized. You can cancel or change them whenever you wish. Depending on your state, the documents may include a Healthcare Proxy form, Living Will, Durable Medical Power of , Advance Directive, or others.  The Familys Role  The best help a family can give is to support their loved ones wishes. Open and honest communication is vital. Family should express any concerns they have about the patients choices while the patient can still make decisions.    6084-5281 [employer]. [address] [place] [address]. All rights reserved. This information is not intended as a substitute for professional medical care. Always follow your healthcare professional's instructions.         Also, [facility] [place] offers a free, downloadable health care directive that allows you to share your treatment choices and personal preferences if you cannot communicate your wishes. It also allows you to appoint another person (called a health care agent) to make health care decisions if you are unable to do so. You can download an advance directive by going here: [email or web address]     Patient Education   Personalized Prevention Plan  You are due for the preventive services outlined below.  Your care team is available to assist you in scheduling these services.  If you have already completed any of these items, please share that information with your care team to update in your medical record.  Health Maintenance Due   Topic Date Due   ? ASTHMA ACTION PLAN  Never done   ? DEPRESSION ACTION PLAN  Never done   ? DIABETIC EYE EXAM  Never done   ? COVID-19 Vaccine (1) Never done   ? HIV SCREENING  Never done   ? COLORECTAL CANCER SCREENING  Never done   ? HEPATITIS B VACCINES (1 of 3 - Risk 3-dose series) Never  done   ? ZOSTER VACCINES (1 of 2) Never done   ? PAP SMEAR  04/22/2020   ? HPV TEST  04/22/2020   ? A1C  05/05/2021   ? BMP  06/22/2021   ? LIPID  06/22/2021

## 2021-08-19 ENCOUNTER — OFFICE VISIT (OUTPATIENT)
Dept: FAMILY MEDICINE | Facility: CLINIC | Age: 56
End: 2021-08-19
Payer: MEDICARE

## 2021-08-19 VITALS
DIASTOLIC BLOOD PRESSURE: 89 MMHG | WEIGHT: 252 LBS | OXYGEN SATURATION: 97 % | SYSTOLIC BLOOD PRESSURE: 130 MMHG | BODY MASS INDEX: 39.76 KG/M2 | HEART RATE: 106 BPM | TEMPERATURE: 98.3 F

## 2021-08-19 DIAGNOSIS — R80.9 TYPE 2 DIABETES MELLITUS WITH MICROALBUMINURIA, WITHOUT LONG-TERM CURRENT USE OF INSULIN (H): ICD-10-CM

## 2021-08-19 DIAGNOSIS — E11.29 TYPE 2 DIABETES MELLITUS WITH MICROALBUMINURIA, WITHOUT LONG-TERM CURRENT USE OF INSULIN (H): ICD-10-CM

## 2021-08-19 DIAGNOSIS — M79.674 PAIN OF TOE OF RIGHT FOOT: ICD-10-CM

## 2021-08-19 DIAGNOSIS — Z12.4 SCREENING FOR MALIGNANT NEOPLASM OF CERVIX: ICD-10-CM

## 2021-08-19 DIAGNOSIS — E11.9 TYPE 2 DIABETES MELLITUS WITHOUT COMPLICATION, WITHOUT LONG-TERM CURRENT USE OF INSULIN (H): ICD-10-CM

## 2021-08-19 DIAGNOSIS — Z79.899 ENCOUNTER FOR LONG-TERM CURRENT USE OF MEDICATION: Primary | ICD-10-CM

## 2021-08-19 LAB
ANION GAP SERPL CALCULATED.3IONS-SCNC: 12 MMOL/L (ref 5–18)
BUN SERPL-MCNC: 16 MG/DL (ref 8–22)
CALCIUM SERPL-MCNC: 10 MG/DL (ref 8.5–10.5)
CHLORIDE BLD-SCNC: 103 MMOL/L (ref 98–107)
CO2 SERPL-SCNC: 25 MMOL/L (ref 22–31)
CREAT SERPL-MCNC: 0.92 MG/DL (ref 0.6–1.1)
ERYTHROCYTE [DISTWIDTH] IN BLOOD BY AUTOMATED COUNT: 13.1 % (ref 10–15)
GFR SERPL CREATININE-BSD FRML MDRD: 69 ML/MIN/1.73M2
GLUCOSE BLD-MCNC: 188 MG/DL (ref 70–125)
HBA1C MFR BLD: 8 % (ref 0–5.6)
HCT VFR BLD AUTO: 40.3 % (ref 35–47)
HGB BLD-MCNC: 13.3 G/DL (ref 11.7–15.7)
MCH RBC QN AUTO: 30.4 PG (ref 26.5–33)
MCHC RBC AUTO-ENTMCNC: 33 G/DL (ref 31.5–36.5)
MCV RBC AUTO: 92 FL (ref 78–100)
PLATELET # BLD AUTO: 265 10E3/UL (ref 150–450)
POTASSIUM BLD-SCNC: 3.9 MMOL/L (ref 3.5–5)
RBC # BLD AUTO: 4.37 10E6/UL (ref 3.8–5.2)
SODIUM SERPL-SCNC: 140 MMOL/L (ref 136–145)
WBC # BLD AUTO: 11.8 10E3/UL (ref 4–11)

## 2021-08-19 PROCEDURE — 80048 BASIC METABOLIC PNL TOTAL CA: CPT | Performed by: FAMILY MEDICINE

## 2021-08-19 PROCEDURE — 87624 HPV HI-RISK TYP POOLED RSLT: CPT | Performed by: FAMILY MEDICINE

## 2021-08-19 PROCEDURE — 99214 OFFICE O/P EST MOD 30 MIN: CPT | Performed by: FAMILY MEDICINE

## 2021-08-19 PROCEDURE — G0123 SCREEN CERV/VAG THIN LAYER: HCPCS | Performed by: FAMILY MEDICINE

## 2021-08-19 PROCEDURE — 85027 COMPLETE CBC AUTOMATED: CPT | Performed by: FAMILY MEDICINE

## 2021-08-19 PROCEDURE — 36415 COLL VENOUS BLD VENIPUNCTURE: CPT | Performed by: FAMILY MEDICINE

## 2021-08-19 PROCEDURE — 83036 HEMOGLOBIN GLYCOSYLATED A1C: CPT | Performed by: FAMILY MEDICINE

## 2021-08-19 RX ORDER — HYDROCODONE BITARTRATE AND ACETAMINOPHEN 7.5; 325 MG/1; MG/1
1 TABLET ORAL 3 TIMES DAILY PRN
Qty: 90 TABLET | Refills: 0 | Status: SHIPPED | OUTPATIENT
Start: 2021-08-19 | End: 2021-09-15

## 2021-08-19 NOTE — TELEPHONE ENCOUNTER
Routing refill request to provider for review/approval because:  Controlled substance request    Last Written Prescription Date:  7/14/21  Last Fill Quantity: 90,  # refills: 0   Last office visit provider:  5/24/21     Requested Prescriptions   Pending Prescriptions Disp Refills     HYDROcodone-acetaminophen (NORCO) 7.5-325 MG per tablet 90 tablet 0     Sig: [HYDROCODONE-ACETAMINOPHEN (NORCO) 7.5-325 MG PER TABLET] Take 1 tablet by mouth 3 times a day.       There is no refill protocol information for this order          Jarett Connelly RN 08/19/21 12:00 PM

## 2021-08-20 NOTE — PROGRESS NOTES
Subjective:  56 year old female with concerns of follow-up.  She broke her toe and May this year and was evaluated at the [facility] [place] emergency department.  She declined the recommended treatment at that time.  She continues to have pain and wonders what is available to help her now.    Patient also presenting for Pap smear.  Denies any concerns.  Not sexually active.  No concerns of STIs.    Needs diabetic recheck.  Denies excessive thirst or urination.  She does not exercise much.  Mobility is a bit limited with knee pain and now her toe pain.    Outpatient Medications Prior to Visit   Medication Sig Dispense Refill     albuterol (PROVENTIL) 2.5 mg /3 mL (0.083 %) nebulizer solution [ALBUTEROL (PROVENTIL) 2.5 MG /3 ML (0.083 %) NEBULIZER SOLUTION] NEBULIZE 1 VIAL BY MOUTH EVERY 4 HOURS AS NEEDED FOR WHEEZING. 180 mL 0     amLODIPine (NORVASC) 5 MG tablet [AMLODIPINE (NORVASC) 5 MG TABLET] Take 1 tablet (5 mg total) by mouth daily. 30 tablet 11     aspirin 325 MG tablet [ASPIRIN 325 MG TABLET] Take 325 mg by mouth.       atorvastatin (LIPITOR) 40 MG tablet [ATORVASTATIN (LIPITOR) 40 MG TABLET] TAKE ONE TABLET BY MOUTH ONE TIME DAILY  90 tablet 3     cetirizine (ZYRTEC) 10 MG tablet [CETIRIZINE (ZYRTEC) 10 MG TABLET] Take 1 tablet (10 mg total) by mouth daily. 90 tablet 1     DULoxetine (CYMBALTA) 60 MG capsule [DULOXETINE (CYMBALTA) 60 MG CAPSULE] Take 1 capsule by mouth daily. Start after 1 week of decreased venlafaxine dose. 90 capsule 3     fluticasone propion-salmeteroL (ADVAIR) 250-50 mcg/dose DISKUS [FLUTICASONE PROPION-SALMETEROL (ADVAIR) 250-50 MCG/DOSE DISKUS] Inhale 1 puff.       fluticasone propionate (FLONASE) 50 mcg/actuation nasal spray [FLUTICASONE PROPIONATE (FLONASE) 50 MCG/ACTUATION NASAL SPRAY] 2 sprays into each nostril daily. 10 g 3     hydroCHLOROthiazide (HYDRODIURIL) 25 MG tablet [HYDROCHLOROTHIAZIDE (HYDRODIURIL) 25 MG TABLET] Take 1 tablet (25 mg total) by mouth daily. 90 tablet 3      hydrOXYzine HCL (ATARAX) 25 MG tablet [HYDROXYZINE HCL (ATARAX) 25 MG TABLET] Take 1 tablet by mouth every 8 hours as needed for itching. 90 tablet 2     ibuprofen (ADVIL,MOTRIN) 800 MG tablet [IBUPROFEN (ADVIL,MOTRIN) 800 MG TABLET] Take 800 mg by mouth.       losartan (COZAAR) 100 MG tablet [LOSARTAN (COZAAR) 100 MG TABLET] Take 1 tablet (100 mg total) by mouth daily. 90 tablet 3     metFORMIN (GLUCOPHAGE) 500 MG tablet [METFORMIN (GLUCOPHAGE) 500 MG TABLET] TAKE ONE TABLET BY MOUTH TWICE A DAY WITH MEALS  60 tablet 0     metoprolol succinate (TOPROL-XL) 100 MG 24 hr tablet [METOPROLOL SUCCINATE (TOPROL-XL) 100 MG 24 HR TABLET] Take 1 tablet by mouth daily 90 tablet 0     SYMBICORT 160-4.5 mcg/actuation inhaler [SYMBICORT 160-4.5 MCG/ACTUATION INHALER] INHALE 2 PUFFS BY MOUTH 2 TIMES A DAY. 1 Inhaler 0     triamcinolone (KENALOG) 0.1 % cream [TRIAMCINOLONE (KENALOG) 0.1 % CREAM] APPLY TOPICALLY TO RASH ON BOTH LEGS TWO TIMES A DAY FOR 2 TO 3 WEEKS. 80 g 0     VENTOLIN HFA 90 mcg/actuation inhaler [VENTOLIN HFA 90 MCG/ACTUATION INHALER] INHALE 2 PUFFS BY MOUTH  EVERY 4 HOURS AS NEEDED FOR WHEEZING. 18 g 2     HYDROcodone-acetaminophen (NORCO) 7.5-325 MG per tablet [HYDROCODONE-ACETAMINOPHEN (NORCO) 7.5-325 MG PER TABLET] Take 1 tablet by mouth 3 times a day. 90 tablet 0     No facility-administered medications prior to visit.      History   Smoking Status     Never Smoker   Smokeless Tobacco     Never Used     Comment: no second hand smoke exposure      Objective:  /89 (BP Location: Left arm, Patient Position: Sitting, Cuff Size: Adult Regular)   Pulse 106   Temp 98.3  F (36.8  C) (Temporal)   Wt 114.3 kg (252 lb)   SpO2 97%   BMI 39.76 kg/m    GENERAL: alert, not distressed  CHEST: clear, no rales, rhonchi, or wheezes  CARDIAC: regular without murmur, gallop, or rub  ABDOMEN: soft, non tender, non distended, normal bowel sounds    FOOT EXAM:  DP and PT pulses are normal.  Monofilament testing  "normal  Nails normal.  Hair growth absent.  Skin without any significant changes    Pain in the middle phalanx of right 3rd toe  No significant deformity there.  Normal circulatory, order, and sensitivity.    Recent Results (from the past 24 hour(s))   Basic metabolic panel  (Ca, Cl, CO2, Creat, Gluc, K, Na, BUN)    Collection Time: 08/19/21  6:07 PM   Result Value Ref Range    Sodium 140 136 - 145 mmol/L    Potassium 3.9 3.5 - 5.0 mmol/L    Chloride 103 98 - 107 mmol/L    Carbon Dioxide (CO2) 25 22 - 31 mmol/L    Anion Gap 12 5 - 18 mmol/L    Urea Nitrogen 16 8 - 22 mg/dL    Creatinine 0.92 0.60 - 1.10 mg/dL    Calcium 10.0 8.5 - 10.5 mg/dL    Glucose 188 (H) 70 - 125 mg/dL    GFR Estimate 69 >60 mL/min/1.73m2   Hemoglobin A1c    Collection Time: 08/19/21  6:07 PM   Result Value Ref Range    Hemoglobin A1C 8.0 (H) 0.0 - 5.6 %   CBC with platelets    Collection Time: 08/19/21  6:07 PM   Result Value Ref Range    WBC Count 11.8 (H) 4.0 - 11.0 10e3/uL    RBC Count 4.37 3.80 - 5.20 10e6/uL    Hemoglobin 13.3 11.7 - 15.7 g/dL    Hematocrit 40.3 35.0 - 47.0 %    MCV 92 78 - 100 fL    MCH 30.4 26.5 - 33.0 pg    MCHC 33.0 31.5 - 36.5 g/dL    RDW 13.1 10.0 - 15.0 %    Platelet Count 265 150 - 450 10e3/uL      Assessment and Plan:   1. Encounter for long-term current use of medication  Alcohol use discussed.  1-2 drinks per day.  Chart is recommending I add \"alcohol dependence\"   Not sure that is accurate.  It appears to have been out of a [facility] chart.  - OLQ5702 - Urine Drugs of Abuse Panel 13 - (Screening); Future    2. Type 2 diabetes mellitus without complication, without long-term current use of insulin (H)  Will need a step up in therapy.  - Basic metabolic panel  (Ca, Cl, CO2, Creat, Gluc, K, Na, BUN); Future  - Hemoglobin A1c; Future  - CBC with platelets; Future  - Basic metabolic panel  (Ca, Cl, CO2, Creat, Gluc, K, Na, BUN)  - Hemoglobin A1c  - CBC with platelets    3. Pain of toe of right foot  Will need " orthopedic or podiatry referral and intervention.  - Orthopedic  Referral; Future    4. Screening for malignant neoplasm of cervix  Physical exam normal  Pap done today.    - Pap Screen with HPV - recommended age 30 - 65 years; Future  - Pap Screen with HPV - recommended age 30 - 65 years  - HPV High Risk Types DNA Cervical

## 2021-08-23 ENCOUNTER — TELEPHONE (OUTPATIENT)
Dept: FAMILY MEDICINE | Facility: CLINIC | Age: 56
End: 2021-08-23

## 2021-08-23 LAB
HUMAN PAPILLOMA VIRUS 16 DNA: NEGATIVE
HUMAN PAPILLOMA VIRUS 18 DNA: NEGATIVE
HUMAN PAPILLOMA VIRUS FINAL DIAGNOSIS: NORMAL
HUMAN PAPILLOMA VIRUS OTHER HR: NEGATIVE

## 2021-08-26 LAB
BKR LAB AP GYN ADEQUACY: NORMAL
BKR LAB AP GYN INTERPRETATION: NORMAL
BKR LAB AP HPV REFLEX: NORMAL
BKR LAB AP PREVIOUS ABNORMAL: NORMAL
PATH REPORT.COMMENTS IMP SPEC: NORMAL
PATH REPORT.RELEVANT HX SPEC: NORMAL

## 2021-09-15 ENCOUNTER — TELEPHONE (OUTPATIENT)
Dept: FAMILY MEDICINE | Facility: CLINIC | Age: 56
End: 2021-09-15

## 2021-09-15 NOTE — TELEPHONE ENCOUNTER
Due for refill on 9/19  Will refill when able.  Technical issues (computer problems) making refilling controlled substances electronically not possible today.  Hope to be fixed soon.

## 2021-09-15 NOTE — TELEPHONE ENCOUNTER
Reason for Call:  Medication or medication refill:    Do you use a [facility]?  Name of the pharmacy and phone number for the current request:  [facility]    Name of the medication requested: HYDROcodone-acetaminophen (NORCO) 7.5-325 mg per tablet    Other request: Patient called to request a refill. Please send refill to pharmacy.    Can we leave a detailed message on this number? YES    Phone number patient can be reached at: Home number on file [phone] (home)    Best Time: any    Call taken on 9/15/2021 at 12:43 PM by Ivelisse Granados

## 2021-09-20 RX ORDER — HYDROCODONE BITARTRATE AND ACETAMINOPHEN 7.5; 325 MG/1; MG/1
1 TABLET ORAL 3 TIMES DAILY PRN
Qty: 90 TABLET | Refills: 0 | Status: SHIPPED | OUTPATIENT
Start: 2021-09-20 | End: 2021-10-19

## 2021-09-20 NOTE — TELEPHONE ENCOUNTER
Pt calling checking on her RX she needs to ride a bus to pick it up so she is hoping it gets refilled today

## 2021-10-18 ENCOUNTER — TELEPHONE (OUTPATIENT)
Dept: FAMILY MEDICINE | Facility: CLINIC | Age: 56
End: 2021-10-18

## 2021-10-18 DIAGNOSIS — M17.10 UNILATERAL PRIMARY OSTEOARTHRITIS, UNSPECIFIED KNEE: ICD-10-CM

## 2021-10-18 DIAGNOSIS — L30.9 ECZEMA, UNSPECIFIED TYPE: ICD-10-CM

## 2021-10-18 DIAGNOSIS — F11.90 CHRONIC, CONTINUOUS USE OF OPIOIDS: Primary | ICD-10-CM

## 2021-10-19 RX ORDER — TRIAMCINOLONE ACETONIDE 1 MG/G
CREAM TOPICAL 2 TIMES DAILY
Qty: 80 G | Refills: 0 | Status: SHIPPED | OUTPATIENT
Start: 2021-10-19 | End: 2021-10-20

## 2021-10-19 RX ORDER — HYDROCODONE BITARTRATE AND ACETAMINOPHEN 7.5; 325 MG/1; MG/1
1 TABLET ORAL 3 TIMES DAILY PRN
Qty: 90 TABLET | Refills: 0 | Status: SHIPPED | OUTPATIENT
Start: 2021-10-21 | End: 2021-11-19

## 2021-10-19 NOTE — TELEPHONE ENCOUNTER
Routing refill request to provider for review/approval because:  Controlled substance request    Last Written Prescription Date:  9/20/21  Last Fill Quantity: 90,  # refills: 0   Last office visit provider:  8/19/21     Requested Prescriptions   Pending Prescriptions Disp Refills     HYDROcodone-acetaminophen (NORCO) 7.5-325 MG per tablet 90 tablet 0     Sig: Take 1 tablet by mouth 3 times daily as needed for moderate to severe pain       There is no refill protocol information for this order          Jarett Connelly RN 10/19/21 3:20 PM

## 2021-10-19 NOTE — TELEPHONE ENCOUNTER
"Routing refill request to provider for review/approval because:  A break in medication    Last Written Prescription Date:  10/5/2020  Last Fill Quantity: 80g ,  # refills: 0   Last office visit provider:  8/19/2021     Requested Prescriptions   Pending Prescriptions Disp Refills     triamcinolone (KENALOG) 0.1 % external cream 80 g 0       Topical Steroids and Nonsteroidals Protocol Passed - 10/18/2021  3:29 PM        Passed - Patient is age 6 or older        Passed - Authorizing prescriber's most recent note related to this medication read.     If refill request is for ophthalmic use, please forward request to provider for approval.          Passed - High potency steroid not ordered        Passed - Recent (12 mo) or future (30 days) visit within the authorizing provider's specialty     Patient has had an office visit with the authorizing provider or a provider within the authorizing providers department within the previous 12 mos or has a future within next 30 days. See \"Patient Info\" tab in inbasket, or \"Choose Columns\" in Meds & Orders section of the refill encounter.              Passed - Medication is active on med list             Fiordaliza Swan RN 10/19/21 3:39 PM  "

## 2021-10-20 ENCOUNTER — TELEPHONE (OUTPATIENT)
Dept: FAMILY MEDICINE | Facility: CLINIC | Age: 56
End: 2021-10-20

## 2021-10-20 DIAGNOSIS — L30.9 ECZEMA, UNSPECIFIED TYPE: ICD-10-CM

## 2021-10-20 RX ORDER — TRIAMCINOLONE ACETONIDE 1 MG/G
CREAM TOPICAL 2 TIMES DAILY
Qty: 80 G | Refills: 0 | Status: SHIPPED | OUTPATIENT
Start: 2021-10-20 | End: 2021-11-19

## 2021-10-20 NOTE — TELEPHONE ENCOUNTER
Reason for Call:  Medication or medication refill:    Do you use a [facility]?  Name of the pharmacy and phone number for the current request:  [facility] [phone]  Name of the medication requested:triamcinolone cream 0.1% script sent yesterday it is missing what body part for application they need this for insurance purposes     Other request: no  Can we leave a detailed message on this number? YES    Phone number patient can be reached at: Home number on file [phone] (home)    Best Time: anytime    Call taken on 10/20/2021 at 11:09 AM by Celia Stratton

## 2021-11-19 DIAGNOSIS — L30.9 ECZEMA, UNSPECIFIED TYPE: ICD-10-CM

## 2021-11-19 DIAGNOSIS — M17.10 UNILATERAL PRIMARY OSTEOARTHRITIS, UNSPECIFIED KNEE: ICD-10-CM

## 2021-11-19 DIAGNOSIS — F11.90 CHRONIC, CONTINUOUS USE OF OPIOIDS: ICD-10-CM

## 2021-11-19 RX ORDER — TRIAMCINOLONE ACETONIDE 1 MG/G
CREAM TOPICAL 2 TIMES DAILY
Qty: 80 G | Refills: 0 | Status: SHIPPED | OUTPATIENT
Start: 2021-11-19 | End: 2023-03-06

## 2021-11-19 RX ORDER — HYDROCODONE BITARTRATE AND ACETAMINOPHEN 7.5; 325 MG/1; MG/1
1 TABLET ORAL 3 TIMES DAILY PRN
Qty: 90 TABLET | Refills: 0 | Status: SHIPPED | OUTPATIENT
Start: 2021-11-19 | End: 2021-12-21

## 2021-11-19 NOTE — TELEPHONE ENCOUNTER
Reason for Call:  Medication or medication refill:    Do you use a [facility]?  Name of the pharmacy and phone number for the current request:  [facility] [address]    Name of the medication requested: hydrocodone and   triamcinolone  Other request:     Can we leave a detailed message on this number? Not Applicable    Phone number patient can be reached at: Home number on file [phone] (home)    Best Time: asap     Call taken on 11/19/2021 at 1:26 PM by Sally Wheat

## 2021-11-19 NOTE — TELEPHONE ENCOUNTER
Rx refill for controlled substance  Dr Conrad out of clinic. Sent to [facility], Dr Nubia Nunes RN  [facility]

## 2021-11-22 DIAGNOSIS — I10 ESSENTIAL HYPERTENSION: ICD-10-CM

## 2021-11-23 RX ORDER — AMLODIPINE BESYLATE 5 MG/1
5 TABLET ORAL DAILY
Qty: 90 TABLET | Refills: 1 | Status: SHIPPED | OUTPATIENT
Start: 2021-11-23 | End: 2022-05-26

## 2021-11-24 NOTE — TELEPHONE ENCOUNTER
"Last Written Prescription Date:  11/6/20  Last Fill Quantity: 30,  # refills: 11   Last office visit provider:  Aj Conrad     Requested Prescriptions   Pending Prescriptions Disp Refills     amLODIPine (NORVASC) 5 MG tablet 30 tablet 11     Sig: Take 1 tablet (5 mg) by mouth daily       Calcium Channel Blockers Protocol  Passed - 11/22/2021  7:57 AM        Passed - Blood pressure under 140/90 in past 12 months     BP Readings from Last 3 Encounters:   08/19/21 130/89   05/24/21 122/84   11/05/20 (!) 162/100                 Passed - Recent (12 mo) or future (30 days) visit within the authorizing provider's specialty     Patient has had an office visit with the authorizing provider or a provider within the authorizing providers department within the previous 12 mos or has a future within next 30 days. See \"Patient Info\" tab in inbasket, or \"Choose Columns\" in Meds & Orders section of the refill encounter.              Passed - Medication is active on med list        Passed - Patient is age 18 or older        Passed - No active pregnancy on record        Passed - Normal serum creatinine on file in past 12 months     Recent Labs   Lab Test 08/19/21  1807   CR 0.92       Ok to refill medication if creatinine is low          Passed - No positive pregnancy test in past 12 months             Ewelina Rooney [facility] 11/23/21 8:46 PM    "

## 2021-12-20 ENCOUNTER — TELEPHONE (OUTPATIENT)
Dept: FAMILY MEDICINE | Facility: CLINIC | Age: 56
End: 2021-12-20
Payer: MEDICARE

## 2021-12-20 DIAGNOSIS — F11.90 CHRONIC, CONTINUOUS USE OF OPIOIDS: ICD-10-CM

## 2021-12-20 DIAGNOSIS — M17.10 UNILATERAL PRIMARY OSTEOARTHRITIS, UNSPECIFIED KNEE: ICD-10-CM

## 2021-12-20 NOTE — TELEPHONE ENCOUNTER
Patient calling back again today asking for the refill please      Reason for Call:  Medication or medication refill:    Do you use a [facility]?  Name of the pharmacy and phone number for the current request:  [facility]    Name of the medication requested: oxycodone    Other request:     Can we leave a detailed message on this number? Not Applicable    Phone number patient can be reached at: Home number on file [phone] (home)    Best Time: asap    Call taken on 12/20/2021 at 1:14 PM by Sally Wheat

## 2021-12-21 RX ORDER — HYDROCODONE BITARTRATE AND ACETAMINOPHEN 7.5; 325 MG/1; MG/1
1 TABLET ORAL 3 TIMES DAILY PRN
Qty: 90 TABLET | Refills: 0 | Status: SHIPPED | OUTPATIENT
Start: 2021-12-21 | End: 2022-01-24

## 2022-01-13 ENCOUNTER — NURSE TRIAGE (OUTPATIENT)
Dept: FAMILY MEDICINE | Facility: CLINIC | Age: 57
End: 2022-01-13
Payer: MEDICARE

## 2022-01-13 NOTE — TELEPHONE ENCOUNTER
Reason for Call:  Other     Detailed comments:   pt is concerned she lost 4 pounds since Monday and this is making her scared Monday she weighed 240 and today is 237..She has been drinking a lot of water and this makes her pee alot    Phone Number Patient can be reached at: Home number on file [phone] (home)    Best Time: anytime    Can we leave a detailed message on this number? YES    Call taken on 1/13/2022 at 4:35 PM by Celia Stratton

## 2022-01-14 NOTE — TELEPHONE ENCOUNTER
RN called patient regarding her concern of itching all over, drinking lots of water and urinating a lot.     Patient stated she has been itching all over for almost a month. She applied lotion and Vaseline and took prescribed Atarax but did not help. Denied chest pain , difficulty breathing, or fever.      Per protocol, patient needs to be seen today or tomorrow.      No appointment for in person visit today. Patient can come in next Tuesday.       RN assisted patient to make an appointment   Appointments in Next Year    Jan 18, 2022  1:40 PM  (Arrive by 1:20 PM)  Provider Visit with Aileen Blas PA-C  [facility] [phone]              RN advised patient to call back or go to urgent care if she has any questions or symptoms get worse.    Patient verbalized understanding and agreed with the plan.        Jessica Mariscal RN  [facility]      Reason for Disposition    Patient wants to be seen    Additional Information    Negative: Life-threatening reaction (anaphylaxis) in the past to similar substance (e.g., food, insect bite/sting, chemical, etc.) and < 2 hours since exposure    Negative: Difficulty breathing or wheezing    Negative: Difficulty swallowing or slurred speech and sudden onset    Negative: Sounds like a life-threatening emergency to the triager    Negative: Insect bites suspected    Negative: Hives suspected (urticaria, itchy pink bumps with pale centers that come and go over minutes to hours)    Negative: Widespread rash also present    Negative: Itching in just one area or spot    Negative: Patient sounds very sick or weak to the triager    Negative: MODERATE-SEVERE widespread itching (i.e., interferes with sleep, normal activities or school) and pregnant    Negative: MODERATE-SEVERE widespread itching (i.e., interferes with sleep, normal activities or school) and not improved after 24 hours of itching  "Care Advice    Answer Assessment - Initial Assessment Questions  1. DESCRIPTION: \"Describe the itching you are having.\"      All over the body    2. SEVERITY: \"How bad is it?\"     - MILD - doesn't interfere with normal activities    - MODERATE-SEVERE: interferes with work, school, sleep, or other activities   Moderate to severe    3. SCRATCHING: \"Are there any scratch marks? Bleeding?\"  No    4. ONSET: \"When did this begin?\"   Before or around x'mas     5. CAUSE: \"What do you think is causing the itching?\" (ask about swimming pools, pollen, animals, soaps, etc.)  Not sure    6. OTHER SYMPTOMS: \"Do you have any other symptoms?\"   Drinking a lot of water; voiding a lot  No pain; no fever    7. PREGNANCY: \"Is there any chance you are pregnant?\" \"When was your last menstrual period?\"  No    Protocols used: ITCHING - WIDESPREAD-A-OH    "

## 2022-01-24 ENCOUNTER — TELEPHONE (OUTPATIENT)
Dept: FAMILY MEDICINE | Facility: CLINIC | Age: 57
End: 2022-01-24
Payer: MEDICARE

## 2022-01-24 DIAGNOSIS — F11.90 CHRONIC, CONTINUOUS USE OF OPIOIDS: ICD-10-CM

## 2022-01-24 DIAGNOSIS — M17.10 UNILATERAL PRIMARY OSTEOARTHRITIS, UNSPECIFIED KNEE: ICD-10-CM

## 2022-01-24 DIAGNOSIS — J30.9 ALLERGIC RHINITIS, UNSPECIFIED SEASONALITY, UNSPECIFIED TRIGGER: ICD-10-CM

## 2022-01-24 DIAGNOSIS — F41.1 GAD (GENERALIZED ANXIETY DISORDER): ICD-10-CM

## 2022-01-24 RX ORDER — HYDROXYZINE HYDROCHLORIDE 25 MG/1
25 TABLET, FILM COATED ORAL EVERY 8 HOURS PRN
Qty: 90 TABLET | Refills: 2 | Status: SHIPPED | OUTPATIENT
Start: 2022-01-24 | End: 2022-05-26

## 2022-01-24 RX ORDER — HYDROCODONE BITARTRATE AND ACETAMINOPHEN 7.5; 325 MG/1; MG/1
1 TABLET ORAL 3 TIMES DAILY PRN
Qty: 90 TABLET | Refills: 0 | Status: SHIPPED | OUTPATIENT
Start: 2022-01-24 | End: 2022-02-24

## 2022-01-24 NOTE — TELEPHONE ENCOUNTER
Reason for Call:  Medication or medication refill:    Do you use a [facility]?  Name of the pharmacy and phone number for the current request:      Name of the medication requested: hydrocodone and hydroxyzine    Other request:     Can we leave a detailed message on this number? Not Applicable    Phone number patient can be reached at: Home number on file [phone] (home)    Best Time: asap    Call taken on 1/24/2022 at 10:24 AM by Sally Wheat

## 2022-01-25 NOTE — TELEPHONE ENCOUNTER
Patient is calling today asking about her refill.. she did have an appt 1/26 but wasn't able to keep it. I sent her to scheduling to make appt and told her id send a note to dr conrad regarding her refill      Patient has a future F2F appointment on 1/26/22 with Dr. Conrad. Completing task.

## 2022-02-24 ENCOUNTER — TELEPHONE (OUTPATIENT)
Dept: FAMILY MEDICINE | Facility: CLINIC | Age: 57
End: 2022-02-24
Payer: MEDICARE

## 2022-02-24 DIAGNOSIS — M17.10 UNILATERAL PRIMARY OSTEOARTHRITIS, UNSPECIFIED KNEE: ICD-10-CM

## 2022-02-24 DIAGNOSIS — F11.90 CHRONIC, CONTINUOUS USE OF OPIOIDS: ICD-10-CM

## 2022-02-24 RX ORDER — HYDROCODONE BITARTRATE AND ACETAMINOPHEN 7.5; 325 MG/1; MG/1
1 TABLET ORAL 3 TIMES DAILY PRN
Qty: 90 TABLET | Refills: 0 | Status: SHIPPED | OUTPATIENT
Start: 2022-02-24 | End: 2022-03-21

## 2022-02-24 NOTE — TELEPHONE ENCOUNTER
Reason for Call:  Medication or medication refill:    Do you use a [facility]?  Name of the pharmacy and phone number for the current request:  [facility] pharmacy on file    Name of the medication requested: HYDROcodone-acetaminophen (NORCO) 7.5-325 MG per tablet    Other request: Patient called to request a refill of this medication. Please send refill to pharmacy.    Can we leave a detailed message on this number? YES    Phone number patient can be reached at: Home number on file [phone] (home)    Best Time: any    Call taken on 2/24/2022 at 12:01 PM by Ivelisse Granados

## 2022-02-25 NOTE — TELEPHONE ENCOUNTER
I refilled.  We need 4 visit per year minimum for DM and pain issues.  Need to see her in clinic before next refill.

## 2022-03-15 ENCOUNTER — OFFICE VISIT (OUTPATIENT)
Dept: FAMILY MEDICINE | Facility: CLINIC | Age: 57
End: 2022-03-15
Payer: MEDICARE

## 2022-03-15 ENCOUNTER — ANCILLARY PROCEDURE (OUTPATIENT)
Dept: MAMMOGRAPHY | Facility: CLINIC | Age: 57
End: 2022-03-15
Attending: FAMILY MEDICINE
Payer: MEDICARE

## 2022-03-15 VITALS
SYSTOLIC BLOOD PRESSURE: 123 MMHG | DIASTOLIC BLOOD PRESSURE: 80 MMHG | RESPIRATION RATE: 16 BRPM | WEIGHT: 240 LBS | HEART RATE: 91 BPM | BODY MASS INDEX: 37.87 KG/M2

## 2022-03-15 DIAGNOSIS — Z12.31 VISIT FOR SCREENING MAMMOGRAM: ICD-10-CM

## 2022-03-15 DIAGNOSIS — Z79.899 ENCOUNTER FOR LONG-TERM (CURRENT) USE OF MEDICATIONS: ICD-10-CM

## 2022-03-15 DIAGNOSIS — F11.90 CHRONIC, CONTINUOUS USE OF OPIOIDS: Primary | ICD-10-CM

## 2022-03-15 DIAGNOSIS — F41.1 GAD (GENERALIZED ANXIETY DISORDER): ICD-10-CM

## 2022-03-15 DIAGNOSIS — Z79.899 ENCOUNTER FOR LONG-TERM CURRENT USE OF MEDICATION: ICD-10-CM

## 2022-03-15 DIAGNOSIS — Z11.59 NEED FOR HEPATITIS B SCREENING TEST: ICD-10-CM

## 2022-03-15 DIAGNOSIS — E11.9 TYPE 2 DIABETES MELLITUS WITHOUT COMPLICATION, WITHOUT LONG-TERM CURRENT USE OF INSULIN (H): ICD-10-CM

## 2022-03-15 DIAGNOSIS — R06.09 DYSPNEA ON EXERTION: ICD-10-CM

## 2022-03-15 DIAGNOSIS — Z12.31 ENCOUNTER FOR SCREENING MAMMOGRAM FOR MALIGNANT NEOPLASM OF BREAST: ICD-10-CM

## 2022-03-15 DIAGNOSIS — Z12.11 SCREEN FOR COLON CANCER: ICD-10-CM

## 2022-03-15 LAB
CREAT UR-MCNC: 44 MG/DL
CREAT UR-MCNC: 46 MG/DL
HBA1C MFR BLD: 11.8 % (ref 0–5.6)
MICROALBUMIN UR-MCNC: <0.5 MG/DL (ref 0–1.99)
MICROALBUMIN/CREAT UR: NORMAL MG/G{CREAT}

## 2022-03-15 PROCEDURE — 83036 HEMOGLOBIN GLYCOSYLATED A1C: CPT | Performed by: FAMILY MEDICINE

## 2022-03-15 PROCEDURE — 90750 HZV VACC RECOMBINANT IM: CPT | Performed by: FAMILY MEDICINE

## 2022-03-15 PROCEDURE — 91306 COVID-19,PF,MODERNA (18+ YRS BOOSTER .25ML): CPT | Performed by: FAMILY MEDICINE

## 2022-03-15 PROCEDURE — 90471 IMMUNIZATION ADMIN: CPT | Performed by: FAMILY MEDICINE

## 2022-03-15 PROCEDURE — 87340 HEPATITIS B SURFACE AG IA: CPT | Performed by: FAMILY MEDICINE

## 2022-03-15 PROCEDURE — 82043 UR ALBUMIN QUANTITATIVE: CPT | Performed by: FAMILY MEDICINE

## 2022-03-15 PROCEDURE — 36415 COLL VENOUS BLD VENIPUNCTURE: CPT | Performed by: FAMILY MEDICINE

## 2022-03-15 PROCEDURE — 77067 SCR MAMMO BI INCL CAD: CPT | Mod: TC | Performed by: RADIOLOGY

## 2022-03-15 PROCEDURE — 80307 DRUG TEST PRSMV CHEM ANLYZR: CPT | Performed by: FAMILY MEDICINE

## 2022-03-15 PROCEDURE — 0064A COVID-19,PF,MODERNA (18+ YRS BOOSTER .25ML): CPT | Performed by: FAMILY MEDICINE

## 2022-03-15 PROCEDURE — 99214 OFFICE O/P EST MOD 30 MIN: CPT | Mod: 25 | Performed by: FAMILY MEDICINE

## 2022-03-15 PROCEDURE — 86706 HEP B SURFACE ANTIBODY: CPT | Performed by: FAMILY MEDICINE

## 2022-03-15 PROCEDURE — G0008 ADMIN INFLUENZA VIRUS VAC: HCPCS | Mod: 59 | Performed by: FAMILY MEDICINE

## 2022-03-15 PROCEDURE — 90682 RIV4 VACC RECOMBINANT DNA IM: CPT | Performed by: FAMILY MEDICINE

## 2022-03-15 RX ORDER — MIRTAZAPINE 15 MG/1
15 TABLET, FILM COATED ORAL AT BEDTIME
Qty: 90 TABLET | Refills: 0 | Status: SHIPPED | OUTPATIENT
Start: 2022-03-15

## 2022-03-15 RX ORDER — DAPAGLIFLOZIN 10 MG/1
10 TABLET, FILM COATED ORAL DAILY
Qty: 90 TABLET | Refills: 0 | Status: SHIPPED | OUTPATIENT
Start: 2022-03-15 | End: 2022-05-27

## 2022-03-15 ASSESSMENT — ANXIETY QUESTIONNAIRES
1. FEELING NERVOUS, ANXIOUS, OR ON EDGE: NEARLY EVERY DAY
5. BEING SO RESTLESS THAT IT IS HARD TO SIT STILL: NEARLY EVERY DAY
2. NOT BEING ABLE TO STOP OR CONTROL WORRYING: MORE THAN HALF THE DAYS
IF YOU CHECKED OFF ANY PROBLEMS ON THIS QUESTIONNAIRE, HOW DIFFICULT HAVE THESE PROBLEMS MADE IT FOR YOU TO DO YOUR WORK, TAKE CARE OF THINGS AT HOME, OR GET ALONG WITH OTHER PEOPLE: VERY DIFFICULT
GAD7 TOTAL SCORE: 20
7. FEELING AFRAID AS IF SOMETHING AWFUL MIGHT HAPPEN: NEARLY EVERY DAY
3. WORRYING TOO MUCH ABOUT DIFFERENT THINGS: NEARLY EVERY DAY
6. BECOMING EASILY ANNOYED OR IRRITABLE: NEARLY EVERY DAY

## 2022-03-15 ASSESSMENT — PATIENT HEALTH QUESTIONNAIRE - PHQ9
5. POOR APPETITE OR OVEREATING: NEARLY EVERY DAY
SUM OF ALL RESPONSES TO PHQ QUESTIONS 1-9: 24

## 2022-03-15 ASSESSMENT — ASTHMA QUESTIONNAIRES: ACT_TOTALSCORE: 15

## 2022-03-15 NOTE — PATIENT INSTRUCTIONS
Narcotics are not always helpful  Chronic narcotics, such as those which you have been on, are very often not a good option.  You appear to have developed tolerance and have found that your current doses of narcotics do not control your pain.  Current medication is only masking the source of your pain, rather than decreasing inflammation, nerve hyperactivity, and muscle spasm -  We need to get at the root cause of your pain to be successful.  Ultimately, the goal should be to slowly get off these medications.   Our care team will work together with you to make this happen.

## 2022-03-15 NOTE — LETTER
Opioid / Opioid Plus Controlled Substance Agreement    This is an agreement between you and your provider about the safe and appropriate use of controlled substance/opioids prescribed by your care team. Controlled substances are medicines that can cause physical and mental dependence (abuse).    There are strict laws about having and using these medicines. We here at [facility] are committing to working with you in your efforts to get better. To support you in this work, we ll help you schedule regular office appointments for medicine refills. If we must cancel or change your appointment for any reason, we ll make sure you have enough medicine to last until your next appointment.     As a Provider, I will:    Listen carefully to your concerns and treat you with respect.     Recommend a treatment plan that I believe is in your best interest. This plan may involve therapies other than opioid pain medication.     Talk with you often about the possible benefits, and the risk of harm of any medicine that we prescribe for you.     Provide a plan on how to taper (discontinue or go off) using this medicine if the decision is made to stop its use.    As a Patient, I understand that opioid(s):     Are a controlled substance prescribed by my care team to help me function or work and manage my condition(s).     Are strong medicines and can cause serious side effects such as:    Drowsiness, which can seriously affect my driving ability    A lower breathing rate, enough to cause death    Harm to my thinking ability     Depression     Abuse of and addiction to this medicine    Need to be taken exactly as prescribed. Combining opioids with certain medicines or chemicals (such as illegal drugs, sedatives, sleeping pills, and benzodiazepines) can be dangerous or even fatal. If I stop opioids suddenly, I may have severe withdrawal symptoms.    Do not work for all types of pain nor for all patients. If they re not helpful, I may  be asked to stop them.        The risks, benefits and side effects of these medicine(s) were explained to me. I agree that:  1. I will take part in other treatments as advised by my care team. This may be psychiatry or counseling, physical therapy, behavioral therapy, group treatment or a referral to a specialist.     2. I will keep all my appointments. I understand that this is part of the monitoring of opioids. My care team may require an office visit for EVERY opioid/controlled substance refill. If I miss appointments or don t follow instructions, my care team may stop my medicine.    3. I will take my medicines as prescribed. I will not change the dose or schedule unless my care team tells me to. There will be no refills if I run out early.     4. I may be asked to come to the clinic and complete a urine drug test or complete a pill count at any time. If I don t give a urine sample or participate in a pill count, the care team may stop my medicine.    5. I will only receive prescriptions from this clinic for chronic pain. If I am treated by another provider for acute pain issues, I will tell them that I am taking opioid pain medication for chronic pain and that I have a treatment agreement with this provider. I will inform my [facility] care team within one business day if I am given a prescription for any pain medication by another healthcare provider. My [facility] care team can contact other providers and pharmacists about my use of any medicines.    6. It is up to me to make sure that I don t run out of my medicines on weekends or holidays. If my care team is willing to refill my opioid prescription without a visit, I must request refills only during office hours. Refills may take up to 3 business days to process. I will use one pharmacy to fill all my opioid and other controlled substance prescriptions. I will notify the clinic about any changes to my insurance or medication  availability.    7. I am responsible for my prescriptions. If the medicine/prescription is lost, stolen or destroyed, it will not be replaced. I also agree not to share controlled substance medicines with anyone.    8. I am aware I should not use any illegal or recreational drugs. I agree not to drink alcohol unless my care team says I can.       9. If I enroll in the [place] [facility] Cannabis program, I will tell my care team prior to my next refill.     10. I will tell my care team right away if I become pregnant, have a new medical problem treated outside of my regular clinic, or have a change in my medications.    11. I understand that this medicine can affect my thinking, judgment and reaction time. Alcohol and drugs affect the brain and body, which can affect the safety of my driving. Being under the influence of alcohol or drugs can affect my decision-making, behaviors, personal safety, and the safety of others. Driving while impaired (DWI) can occur if a person is driving, operating, or in physical control of a car, motorcycle, boat, snowmobile, ATV, motorbike, off-road vehicle, or any other motor vehicle ([place] Statute 169A.20). I understand the risk if I choose to drive or operate any vehicle or machinery.    I understand that if I do not follow any of the conditions above, my prescriptions or treatment may be stopped or changed.          Opioids  What You Need to Know    What are opioids?   Opioids are pain medicines that must be prescribed by a doctor. They are also known as narcotics.     Examples are:   1. morphine (MS Contin, Yue)  2. oxycodone (Oxycontin)  3. oxycodone and acetaminophen (Percocet)  4. hydrocodone and acetaminophen (Vicodin, Norco)   5. fentanyl patch (Duragesic)   6. hydromorphone (Dilaudid)   7. methadone  8. codeine (Tylenol #3)     What do opioids do well?   Opioids are best for severe short-term pain such as after a surgery or injury. They may work well for cancer pain. They may  help some people with long-lasting (chronic) pain.     What do opioids NOT do well?   Opioids never get rid of pain entirely, and they don t work well for most patients with chronic pain. Opioids don t reduce swelling, one of the causes of pain.                                    Other ways to manage chronic pain and improve function include:       Treat the health problem that may be causing pain    Anti-inflammation medicines, which reduce swelling and tenderness, such as ibuprofen (Advil, Motrin) or naproxen (Aleve)    Acetaminophen (Tylenol)    Antidepressants and anti-seizure medicines, especially for nerve pain    Topical treatments such as patches or creams    Injections or nerve blocks    Chiropractic or osteopathic treatment    Acupuncture, massage, deep breathing, meditation, visual imagery, aromatherapy    Use heat or ice at the pain site    Physical therapy     Exercise    Stop smoking    Take part in therapy       Risks and side effects     Talk to your doctor before you start or decide to keep taking opioids. Possible side effects include:      Lowering your breathing rate enough to cause death    Overdose, including death, especially if taking higher than prescribed doses    Worse depression symptoms; less pleasure in things you usually enjoy    Feeling tired or sluggish    Slower thoughts or cloudy thinking    Being more sensitive to pain over time; pain is harder to control    Trouble sleeping or restless sleep    Changes in hormone levels (for example, less testosterone)    Changes in sex drive or ability to have sex    Constipation    Unsafe driving    Itching and sweating    Dizziness    Nausea, throwing up and dry mouth    What else should I know about opioids?    Opioids may lead to dependence, tolerance, or addiction.      Dependence means that if you stop or reduce the medicine too quickly, you will have withdrawal symptoms. These include loose poop (diarrhea), jitters, flu-like symptoms,  nervousness and tremors. Dependence is not the same as addiction.                       Tolerance means needing higher doses over time to get the same effect. This may increase the chance of serious side effects.      Addiction is when people improperly use a substance that harms their body, their mind or their relations with others. Use of opiates can cause a relapse of addiction if you have a history of drug or alcohol abuse.      People who have used opioids for a long time may have a lower quality of life, worse depression, higher levels of pain and more visits to doctors.    You can overdose on opioids. Take these steps to lower your risk of overdose:    1. Recognize the signs:  Signs of overdose include decrease or loss of consciousness (blackout), slowed breathing, trouble waking up and blue lips. If someone is worried about overdose, they should call 911.    2. Talk to your doctor about Narcan (naloxone).   If you are at risk for overdose, you may be given a prescription for Narcan. This medicine very quickly reverses the effects of opioids.   If you overdose, a friend or family member can give you Narcan while waiting for the ambulance. They need to know the signs of overdose and how to give Narcan.     3. Don't use alcohol or street drugs.   Taking them with opioids can cause death.    4. Do not take any of these medicines unless your doctor says it s OK. Taking these with opioids can cause death:    Benzodiazepines, such as lorazepam (Ativan), alprazolam (Xanax) or diazepam (Valium)    Muscle relaxers, such as cyclobenzaprine (Flexeril)    Sleeping pills like zolpidem (Ambien)     Other opioids      How to keep you and other people safe while taking opioids:    1. Never share your opioids with others.  Opioid medicines are regulated by the Drug Enforcement Agency ([place]). Selling or sharing medications is a criminal act.    2. Be sure to store opioids in a secure place, locked up if possible. Young children  can easily swallow them and overdose.    3. When you are traveling with your medicines, keep them in the original bottles. If you use a pill box, be sure you also carry a copy of your medicine list from your clinic or pharmacy.    4. Safe disposal of opioids    Most pharmacies have places to get rid of medicine, called disposal kiosks. Medicine disposal options are also available in every [facility]. Search your county and  medication disposal  to find more options. You can find more details at:  [email or web address]     I agree that my provider, clinic care team, and pharmacy may work with any city, state or federal law enforcement agency that investigates the misuse, sale, or other diversion of my controlled medicine. I will allow my provider to discuss my care with, or share a copy of, this agreement with any other treating provider, pharmacy or emergency room where I receive care.    I have read this agreement and have asked questions about anything I did not understand.    _______________________________________________________  Patient Signature - Rebecca Smalls _____________________                   Date     _______________________________________________________  Provider Signature - Aj Conrad MD   _____________________                   Date     _______________________________________________________  Witness Signature (required if provider not present while patient signing)   _____________________                   Date

## 2022-03-15 NOTE — PROGRESS NOTES
Subjective:  56 year old female with concerns of follow up.  Has many things to accopmlish.  Has chronic pain.  Needs contract, signed.  Depression as noted.  Not sleeping.  Not taking cymbalta--not sure if she ever did.  Requests handicapped sticker.  Will fill that out.  Uses cane and cannot walk >200ft without stopping.    PHQ-9 SCORE 11/5/2020 5/24/2021 3/15/2022   PHQ-9 Total Score 14 26 24       FOX-7 SCORE 5/1/2020 5/24/2021 3/15/2022   Total Score 20 21 20       PEG Score 1/18/2021 5/24/2021 3/15/2022   PEG Total Score 9 7 7     Worried about diabetes.  Weight declined. Not trying  Thirsty often.  Not checking sugars.    Depression:  Cannot sleep  Low energy  Not suicidal.  Lot's of family.  New grand baby, protective factors.    Outpatient Medications Prior to Visit   Medication Sig Dispense Refill     albuterol (PROVENTIL) 2.5 mg /3 mL (0.083 %) nebulizer solution [ALBUTEROL (PROVENTIL) 2.5 MG /3 ML (0.083 %) NEBULIZER SOLUTION] NEBULIZE 1 VIAL BY MOUTH EVERY 4 HOURS AS NEEDED FOR WHEEZING. 180 mL 0     amLODIPine (NORVASC) 5 MG tablet Take 1 tablet (5 mg) by mouth daily 90 tablet 1     atorvastatin (LIPITOR) 40 MG tablet [ATORVASTATIN (LIPITOR) 40 MG TABLET] TAKE ONE TABLET BY MOUTH ONE TIME DAILY  90 tablet 3     cetirizine (ZYRTEC) 10 MG tablet [CETIRIZINE (ZYRTEC) 10 MG TABLET] Take 1 tablet (10 mg total) by mouth daily. 90 tablet 1     fluticasone propion-salmeteroL (ADVAIR) 250-50 mcg/dose DISKUS [FLUTICASONE PROPION-SALMETEROL (ADVAIR) 250-50 MCG/DOSE DISKUS] Inhale 1 puff.       hydroCHLOROthiazide (HYDRODIURIL) 25 MG tablet [HYDROCHLOROTHIAZIDE (HYDRODIURIL) 25 MG TABLET] Take 1 tablet (25 mg total) by mouth daily. 90 tablet 3     losartan (COZAAR) 100 MG tablet [LOSARTAN (COZAAR) 100 MG TABLET] Take 1 tablet (100 mg total) by mouth daily. 90 tablet 3     metoprolol succinate (TOPROL-XL) 100 MG 24 hr tablet [METOPROLOL SUCCINATE (TOPROL-XL) 100 MG 24 HR TABLET] Take 1 tablet by mouth daily 90  tablet 0     SYMBICORT 160-4.5 mcg/actuation inhaler [SYMBICORT 160-4.5 MCG/ACTUATION INHALER] INHALE 2 PUFFS BY MOUTH 2 TIMES A DAY. 1 Inhaler 0     triamcinolone (KENALOG) 0.1 % external cream Apply topically 2 times daily Thin layer affected areas arms and legs 80 g 0     VENTOLIN HFA 90 mcg/actuation inhaler [VENTOLIN HFA 90 MCG/ACTUATION INHALER] INHALE 2 PUFFS BY MOUTH  EVERY 4 HOURS AS NEEDED FOR WHEEZING. 18 g 2     aspirin 325 MG tablet [ASPIRIN 325 MG TABLET] Take 325 mg by mouth. (Patient not taking: Reported on 3/15/2022)       DULoxetine (CYMBALTA) 60 MG capsule [DULOXETINE (CYMBALTA) 60 MG CAPSULE] Take 1 capsule by mouth daily. Start after 1 week of decreased venlafaxine dose. (Patient not taking: Reported on 3/15/2022) 90 capsule 3     fluticasone propionate (FLONASE) 50 mcg/actuation nasal spray [FLUTICASONE PROPIONATE (FLONASE) 50 MCG/ACTUATION NASAL SPRAY] 2 sprays into each nostril daily. (Patient not taking: Reported on 3/15/2022) 10 g 3     HYDROcodone-acetaminophen (NORCO) 7.5-325 MG per tablet Take 1 tablet by mouth 3 times daily as needed for moderate to severe pain 90 tablet 0     hydrOXYzine (ATARAX) 25 MG tablet Take 1 tablet (25 mg) by mouth every 8 hours as needed for itching or anxiety 90 tablet 2     ibuprofen (ADVIL,MOTRIN) 800 MG tablet [IBUPROFEN (ADVIL,MOTRIN) 800 MG TABLET] Take 800 mg by mouth. (Patient not taking: Reported on 3/15/2022)       metFORMIN (GLUCOPHAGE) 500 MG tablet Take 2 tablets (1,000 mg) by mouth 2 times daily (with meals) 360 tablet 0     No facility-administered medications prior to visit.      History   Smoking Status     Never Smoker   Smokeless Tobacco     Never Used     Comment: no second hand smoke exposure      Objective:  /80 (BP Location: Left arm, Patient Position: Sitting, Cuff Size: Adult Large)   Pulse 91   Resp 16   Wt 108.9 kg (240 lb)   BMI 37.87 kg/m    GENERAL: alert, not distressed  CHEST: clear, no rales, rhonchi, or  wheezes  CARDIAC: regular without murmur, gallop, or rub  ABDOMEN: obese, soft, non tender, non distended, normal bowel sounds    Recent Results (from the past 24 hour(s))   HEMOGLOBIN A1C    Collection Time: 03/15/22  3:07 PM   Result Value Ref Range    Hemoglobin A1C 11.8 (H) 0.0 - 5.6 %      Assessment and Plan:   1. Type 2 diabetes mellitus without complication, without long-term current use of insulin (H)  Quite a bit worse.  Needs to add a med.  Will ask MTM or DM Ed to help with CGM.  Add SGLT2  - Albumin Random Urine Quantitative with Creat Ratio; Future  - HEMOGLOBIN A1C; Future  - COVID-19,PF,MODERNA (18+ YRS BOOSTER .25ML)  - HEMOGLOBIN A1C  - Albumin Random Urine Quantitative with Creat Ratio    2. FOX (generalized anxiety disorder)  Needs to address better.  Refer for counseling.  Add med.  Mirtaz for help with sleeping, which is a big problem.  - mirtazapine (REMERON) 15 MG tablet; Take 1 tablet (15 mg) by mouth At Bedtime  Dispense: 90 tablet; Refill: 0  - Adult Mental Health  Referral; Future    3. Chronic, continuous use of opioids  4. Encounter for long-term (current) use of medications  Signed form CSA.  - [id number] - Urine Drug Confirmation Panel (Comprehensive)    5. Screen for colon cancer  Needs colon screening.  - Adult Gastro Ref - Procedure Only; Future    6. Visit for screening mammogram  mammo done today and normal!  - MA SCREENING DIGITAL BILAT - Future  (s+30); Future    7. Need for hepatitis B screening test   - Hepatitis B surface antigen  - Hepatitis B Surface Antibody    Vaccines today:   Covid, flu, shingrix

## 2022-03-16 LAB
HBV SURFACE AB SERPLBLD QL IA.RAPID: NEGATIVE
HBV SURFACE AG SERPL QL IA: NONREACTIVE

## 2022-03-16 ASSESSMENT — ANXIETY QUESTIONNAIRES: GAD7 TOTAL SCORE: 20

## 2022-03-18 LAB
DHC UR CFM-MCNC: 120 NG/ML
DHC/CREAT UR: 261 NG/MG {CREAT}
HYDROCODONE UR CFM-MCNC: 266 NG/ML
HYDROCODONE/CREAT UR: 578 NG/MG {CREAT}

## 2022-03-21 DIAGNOSIS — M17.10 UNILATERAL PRIMARY OSTEOARTHRITIS, UNSPECIFIED KNEE: ICD-10-CM

## 2022-03-21 DIAGNOSIS — F11.90 CHRONIC, CONTINUOUS USE OF OPIOIDS: ICD-10-CM

## 2022-03-21 RX ORDER — HYDROCODONE BITARTRATE AND ACETAMINOPHEN 7.5; 325 MG/1; MG/1
1 TABLET ORAL 3 TIMES DAILY PRN
Qty: 90 TABLET | Refills: 0 | Status: SHIPPED | OUTPATIENT
Start: 2022-03-23 | End: 2022-04-26

## 2022-03-21 NOTE — TELEPHONE ENCOUNTER
Reason for Call:  Medication or medication refill:    Do you use a [facility]?  Name of the pharmacy and phone number for the current request:  [id number] - [place], [place] - [address]  [phone]    Name of the medication requested: HYDROcodone-acetaminophen (NORCO) 7.5-325 MG per tablet    Other request: Patient is out of medication    Can we leave a detailed message on this number? YES    Phone number patient can be reached at: Cell number on file:    Telephone Information:   Mobile [phone]       Best Time: Any time    Call taken on 3/21/2022 at 12:48 PM by Shirlene Rey

## 2022-03-22 DIAGNOSIS — E11.9 TYPE 2 DIABETES MELLITUS WITHOUT COMPLICATION, WITHOUT LONG-TERM CURRENT USE OF INSULIN (H): ICD-10-CM

## 2022-03-22 RX ORDER — ATORVASTATIN CALCIUM 40 MG/1
TABLET, FILM COATED ORAL
Qty: 90 TABLET | Refills: 3 | Status: CANCELLED | OUTPATIENT
Start: 2022-03-22

## 2022-03-23 RX ORDER — ATORVASTATIN CALCIUM 40 MG/1
TABLET, FILM COATED ORAL
Qty: 90 TABLET | Refills: 0 | Status: SHIPPED | OUTPATIENT
Start: 2022-03-23 | End: 2022-07-01

## 2022-03-23 NOTE — TELEPHONE ENCOUNTER
"Last Written Prescription Date:  3/8/2021  Last Fill Quantity: 90,  # refills: 3   Last office visit provider:  3/15/2022     Requested Prescriptions   Pending Prescriptions Disp Refills     atorvastatin (LIPITOR) 40 MG tablet 90 tablet 3     Sig: [ATORVASTATIN (LIPITOR) 40 MG TABLET] TAKE ONE TABLET BY MOUTH ONE TIME DAILY       Statins Protocol Passed - 3/23/2022  9:35 AM        Passed - LDL on file in past 12 months     Recent Labs   Lab Test 05/24/21  1221   LDL 59             Passed - No abnormal creatine kinase in past 12 months     No lab results found.             Passed - Recent (12 mo) or future (30 days) visit within the authorizing provider's specialty     Patient has had an office visit with the authorizing provider or a provider within the authorizing providers department within the previous 12 mos or has a future within next 30 days. See \"Patient Info\" tab in inbasket, or \"Choose Columns\" in Meds & Orders section of the refill encounter.              Passed - Medication is active on med list        Passed - Patient is age 18 or older        Passed - No active pregnancy on record        Passed - No positive pregnancy test in past 12 months             Noreen Goddard RN 03/23/22 9:35 AM  "

## 2022-04-25 DIAGNOSIS — J45.30 MILD PERSISTENT ASTHMA WITHOUT COMPLICATION: ICD-10-CM

## 2022-04-26 ENCOUNTER — TELEPHONE (OUTPATIENT)
Dept: FAMILY MEDICINE | Facility: CLINIC | Age: 57
End: 2022-04-26
Payer: MEDICARE

## 2022-04-26 DIAGNOSIS — F11.90 CHRONIC, CONTINUOUS USE OF OPIOIDS: ICD-10-CM

## 2022-04-26 DIAGNOSIS — M17.10 UNILATERAL PRIMARY OSTEOARTHRITIS, UNSPECIFIED KNEE: ICD-10-CM

## 2022-04-26 RX ORDER — HYDROCODONE BITARTRATE AND ACETAMINOPHEN 7.5; 325 MG/1; MG/1
1 TABLET ORAL 3 TIMES DAILY PRN
Qty: 90 TABLET | Refills: 0 | Status: SHIPPED | OUTPATIENT
Start: 2022-04-26 | End: 2022-05-26

## 2022-04-26 NOTE — TELEPHONE ENCOUNTER
Reason for Call:  Medication or medication refill:    Do you use a [facility]?  Name of the pharmacy and phone number for the current request:      Name of the medication requested: hydrocodone    Other request:     Can we leave a detailed message on this number?     Phone number patient can be reached at: Home number on file [phone] (home)    Best Time: asap    Call taken on 4/26/2022 at 11:13 AM by Sally Wheat

## 2022-04-27 NOTE — TELEPHONE ENCOUNTER
Routing refill request to provider for review/approval because:  Labs out of range:  ACT < 20.            --Last visit:  3/15/2022     --Future Visit:   Next 5 appointments (look out 90 days)    Apr 27, 2022  2:00 PM  (Arrive by 1:40 PM)  Provider Visit with Aj Conrad MD  [facility] ([facility] ) [address] [place] [address] [phone]        ACT Total Scores 1/18/2021 5/24/2021 3/15/2022   ACT TOTAL SCORE (Goal Greater than or Equal to 20) 12 13 15   In the past 12 months, how many times did you visit the emergency room for your asthma without being admitted to the hospital? 0 0 0   In the past 12 months, how many times were you hospitalized overnight because of your asthma? 0 0 0           Medication  VENTOLIN HFA 90 mcg/actuation inhaler [[id number]]    Outpatient Medication Detail     Disp Refills Start End HELGA   VENTOLIN HFA 90 mcg/actuation inhaler 18 g 2 5/15/2021  No   Sig: INHALE 2 PUFFS BY MOUTH  EVERY 4 HOURS AS NEEDED FOR WHEEZING.   Sent to pharmacy as: Ventolin HFA 90 mcg/actuation aerosol inhaler (albuterol)   E-Prescribing Status: Receipt confirmed by pharmacy (5/15/2021  2:46 PM CDT)       VENTOLIN HFA 90 mcg/actuation inhaler [[id number]]    Electronically signed by: Becka Ha RN on 05/15/21 1446 Status: Active   Ordering user: Becka Ha RN 05/15/21 1446 Ordering provider: Aj Conrad MD   Authorized by: Aj Conrad MD   Frequency:  05/15/21 - Until Discontinued Released by: Becka Ha RN 05/15/21 1446   Diagnoses  Mild persistent asthma without complication [J45.30]

## 2022-04-29 RX ORDER — ALBUTEROL SULFATE 90 UG/1
AEROSOL, METERED RESPIRATORY (INHALATION)
Qty: 18 G | Refills: 2 | Status: SHIPPED | OUTPATIENT
Start: 2022-04-29 | End: 2023-07-21

## 2022-05-02 DIAGNOSIS — R06.00 DYSPNEA: Primary | ICD-10-CM

## 2022-05-25 DIAGNOSIS — J30.9 ALLERGIC RHINITIS, UNSPECIFIED SEASONALITY, UNSPECIFIED TRIGGER: ICD-10-CM

## 2022-05-25 DIAGNOSIS — E11.9 TYPE 2 DIABETES MELLITUS WITHOUT COMPLICATION, WITHOUT LONG-TERM CURRENT USE OF INSULIN (H): ICD-10-CM

## 2022-05-25 DIAGNOSIS — F41.1 GAD (GENERALIZED ANXIETY DISORDER): ICD-10-CM

## 2022-05-25 DIAGNOSIS — I10 ESSENTIAL HYPERTENSION: ICD-10-CM

## 2022-05-25 DIAGNOSIS — M17.10 UNILATERAL PRIMARY OSTEOARTHRITIS, UNSPECIFIED KNEE: ICD-10-CM

## 2022-05-25 DIAGNOSIS — F11.90 CHRONIC, CONTINUOUS USE OF OPIOIDS: ICD-10-CM

## 2022-05-25 RX ORDER — DAPAGLIFLOZIN 10 MG/1
10 TABLET, FILM COATED ORAL DAILY
Qty: 90 TABLET | Refills: 0 | Status: CANCELLED | OUTPATIENT
Start: 2022-05-25

## 2022-05-25 NOTE — TELEPHONE ENCOUNTER
Reason for Call:  Medication or medication refill:    Do you use a [facility]?  Name of the pharmacy and phone number for the current request:  [id number] - [place], [place] - [address]     Name of the medication requested:   HYDROcodone-acetaminophen (NORCO) 7.5-325 MG per tablet  amLODIPine (NORVASC) 5 MG tablet  dapagliflozin (FARXIGA) 10 MG TABS tablet  hydrOXYzine (ATARAX) 25 MG tablet    Other request: Patient already contacted pharmacy and was told no more refills. Need to call provider.    Can we leave a detailed message on this number? YES    Phone number patient can be reached at: Cell number on file:    Telephone Information:   Mobile [phone]       Best Time: any    Call taken on 5/25/2022 at 2:02 PM by Mary Cao

## 2022-05-26 DIAGNOSIS — E11.9 TYPE 2 DIABETES MELLITUS WITHOUT COMPLICATION, WITHOUT LONG-TERM CURRENT USE OF INSULIN (H): ICD-10-CM

## 2022-05-26 RX ORDER — HYDROXYZINE HYDROCHLORIDE 25 MG/1
25 TABLET, FILM COATED ORAL EVERY 8 HOURS PRN
Qty: 90 TABLET | Refills: 2 | Status: SHIPPED | OUTPATIENT
Start: 2022-05-26 | End: 2023-06-19

## 2022-05-26 RX ORDER — HYDROCODONE BITARTRATE AND ACETAMINOPHEN 7.5; 325 MG/1; MG/1
1 TABLET ORAL 3 TIMES DAILY PRN
Qty: 90 TABLET | Refills: 0 | Status: SHIPPED | OUTPATIENT
Start: 2022-05-26 | End: 2022-06-28

## 2022-05-26 RX ORDER — AMLODIPINE BESYLATE 5 MG/1
5 TABLET ORAL DAILY
Qty: 90 TABLET | Refills: 1 | Status: SHIPPED | OUTPATIENT
Start: 2022-05-26 | End: 2022-11-06

## 2022-05-27 RX ORDER — DAPAGLIFLOZIN 10 MG/1
10 TABLET, FILM COATED ORAL DAILY
Qty: 90 TABLET | Refills: 0 | Status: SHIPPED | OUTPATIENT
Start: 2022-05-27 | End: 2022-09-29

## 2022-05-27 NOTE — TELEPHONE ENCOUNTER
"Last Written Prescription Date:  3/15/22  Last Fill Quantity: 90,  # refills: 0   Last office visit provider:  3/15/22     Requested Prescriptions   Pending Prescriptions Disp Refills     dapagliflozin (FARXIGA) 10 MG TABS tablet 90 tablet 0     Sig: Take 1 tablet (10 mg) by mouth daily       Sodium Glucose Co-Transport Inhibitor Agents Passed - 5/26/2022  8:31 AM        Passed - Patient has documented A1c within the specified period of time.     If HgbA1C is 8 or greater, it needs to be on file within the past 3 months.  If less than 8, must be on file within the past 6 months.     Recent Labs   Lab Test 03/15/22  1507   A1C 11.8*             Passed - No creatinine >1.4 or GFR <45 within the past 12 mos     Recent Labs   Lab Test 08/19/21  1807 05/24/21  1221   GFRESTIMATED 69 >60   GFRESTBLACK  --  >60       Recent Labs   Lab Test 08/19/21  1807   CR 0.92             Passed - Medication is active on med list        Passed - Patient is age 18 or older        Passed - Patient is not pregnant        Passed - Patient has documented normal Potassium within the last 12 mos.     Recent Labs   Lab Test 08/19/21  1807   POTASSIUM 3.9             Passed - Patient has no positive pregnancy test within the past 12 mos.        Passed - Recent (6 mo) or future (30 days) visit within the authorizing provider's specialty     Patient had office visit in the last 6 months or has a visit in the next 30 days with authorizing provider or within the authorizing provider's specialty.  See \"Patient Info\" tab in inbasket, or \"Choose Columns\" in Meds & Orders section of the refill encounter.                 Leann Nguyen, RN 05/27/22 2:24 PM  "

## 2022-06-27 DIAGNOSIS — F11.90 CHRONIC, CONTINUOUS USE OF OPIOIDS: ICD-10-CM

## 2022-06-27 DIAGNOSIS — M17.10 UNILATERAL PRIMARY OSTEOARTHRITIS, UNSPECIFIED KNEE: ICD-10-CM

## 2022-06-27 DIAGNOSIS — F32.1 MODERATE SINGLE CURRENT EPISODE OF MAJOR DEPRESSIVE DISORDER (H): ICD-10-CM

## 2022-06-27 DIAGNOSIS — F41.1 GAD (GENERALIZED ANXIETY DISORDER): Primary | ICD-10-CM

## 2022-06-27 RX ORDER — DULOXETIN HYDROCHLORIDE 60 MG/1
CAPSULE, DELAYED RELEASE ORAL
COMMUNITY
Start: 2022-03-25 | End: 2022-06-27

## 2022-06-28 RX ORDER — HYDROCODONE BITARTRATE AND ACETAMINOPHEN 7.5; 325 MG/1; MG/1
1 TABLET ORAL 3 TIMES DAILY PRN
Qty: 90 TABLET | Refills: 0 | Status: SHIPPED | OUTPATIENT
Start: 2022-06-28 | End: 2022-08-05

## 2022-06-28 RX ORDER — DULOXETIN HYDROCHLORIDE 60 MG/1
CAPSULE, DELAYED RELEASE ORAL
Qty: 90 CAPSULE | Refills: 2 | Status: SHIPPED | OUTPATIENT
Start: 2022-06-28 | End: 2023-04-09

## 2022-06-28 NOTE — TELEPHONE ENCOUNTER
"    Outpatient Medication Detail     Disp Refills Start End HELGA   DULoxetine (CYMBALTA) 60 MG capsule 90 capsule 3 6/14/2021  No   Sig: Take 1 capsule by mouth daily. Start after 1 week of decreased venlafaxine dose.   Sent to pharmacy as: DULoxetine 60 mg capsule,delayed release (CYMBALTA)   E-Prescribing Status: Receipt confirmed by pharmacy (6/14/2021  9:13 AM CDT)       DULoxetine (CYMBALTA) 60 MG capsule [[id number]]    Electronically signed by: Jarett Connelly RN on 06/14/21 0913 Status: Active   Ordering user: Jarett Connelly RN 06/14/21 0913 Ordering provider: Aj Conrad MD   Authorized by: Aj Conrad MD   Frequency:  06/14/21 - Until Discontinued Released by: Jarett Connelly RN 06/14/21 0913   Diagnoses  FOX (generalized anxiety disorder) [F41.1]  Moderate single current episode of major depressive disorder (H) [F32.1]     Routing refill request to provider for review/approval because:  Drug interaction warning    Last office visit provider:  3/15/22     Requested Prescriptions   Pending Prescriptions Disp Refills     DULoxetine (CYMBALTA) 60 MG capsule         Serotonin-Norepinephrine Reuptake Inhibitors  Passed - 6/28/2022 12:07 PM        Passed - Blood pressure under 140/90 in past 12 months     BP Readings from Last 3 Encounters:   03/15/22 123/80   08/19/21 130/89   05/24/21 122/84                 Passed - Recent (12 mo) or future (30 days) visit within the authorizing provider's specialty     Patient has had an office visit with the authorizing provider or a provider within the authorizing providers department within the previous 12 mos or has a future within next 30 days. See \"Patient Info\" tab in inbasket, or \"Choose Columns\" in Meds & Orders section of the refill encounter.              Passed - Medication is active on med list        Passed - Patient is age 18 or older        Passed - No active pregnancy on record        Passed - No positive pregnancy test in past 12 months           " HYDROcodone-acetaminophen (NORCO) 7.5-325 MG per tablet 90 tablet 0     Sig: Take 1 tablet by mouth 3 times daily as needed for moderate to severe pain       There is no refill protocol information for this order      Signed Prescriptions Disp Refills    DULoxetine (CYMBALTA) 60 MG capsule       Sig: Take 1 capsule by mouth daily. Start after 1 week of decreased venlafaxine dose.       There is no refill protocol information for this order          Jarett Connelly RN 06/28/22 3:13 PM

## 2022-06-28 NOTE — TELEPHONE ENCOUNTER
Patient called to check on the status of this refill request. She is out of medications. Please send refills to pharmacy.

## 2022-06-29 DIAGNOSIS — E11.9 TYPE 2 DIABETES MELLITUS WITHOUT COMPLICATION, WITHOUT LONG-TERM CURRENT USE OF INSULIN (H): ICD-10-CM

## 2022-06-29 DIAGNOSIS — F41.1 GAD (GENERALIZED ANXIETY DISORDER): ICD-10-CM

## 2022-06-29 DIAGNOSIS — F32.1 MODERATE SINGLE CURRENT EPISODE OF MAJOR DEPRESSIVE DISORDER (H): ICD-10-CM

## 2022-06-29 DIAGNOSIS — Z12.31 VISIT FOR SCREENING MAMMOGRAM: ICD-10-CM

## 2022-06-29 DIAGNOSIS — Z12.11 COLON CANCER SCREENING: ICD-10-CM

## 2022-06-29 DIAGNOSIS — E78.00 PURE HYPERCHOLESTEROLEMIA: Primary | ICD-10-CM

## 2022-06-30 RX ORDER — DULOXETIN HYDROCHLORIDE 60 MG/1
CAPSULE, DELAYED RELEASE ORAL
Qty: 90 CAPSULE | Refills: 2 | OUTPATIENT
Start: 2022-06-30

## 2022-06-30 NOTE — TELEPHONE ENCOUNTER
"Routing refill request to provider for review/approval because:  Labs not current:  LDL    Last Written Prescription Date:  3/23/22  Last Fill Quantity: 90,  # refills: 0   Last office visit provider:  3/15/22     Requested Prescriptions   Pending Prescriptions Disp Refills     atorvastatin (LIPITOR) 40 MG tablet 90 tablet 0     Sig: [ATORVASTATIN (LIPITOR) 40 MG TABLET] TAKE ONE TABLET BY MOUTH ONE TIME DAILY       Statins Protocol Failed - 6/30/2022  7:12 AM        Failed - LDL on file in past 12 months     Recent Labs   Lab Test 05/24/21  1221   LDL 59             Passed - No abnormal creatine kinase in past 12 months     No lab results found.             Passed - Recent (12 mo) or future (30 days) visit within the authorizing provider's specialty     Patient has had an office visit with the authorizing provider or a provider within the authorizing providers department within the previous 12 mos or has a future within next 30 days. See \"Patient Info\" tab in inbasket, or \"Choose Columns\" in Meds & Orders section of the refill encounter.              Passed - Medication is active on med list        Passed - Patient is age 18 or older        Passed - No active pregnancy on record        Passed - No positive pregnancy test in past 12 months         Refused Prescriptions Disp Refills     DULoxetine (CYMBALTA) 60 MG capsule 90 capsule 2     Sig: Take 1 capsule by mouth daily.       Serotonin-Norepinephrine Reuptake Inhibitors  Failed - 6/30/2022  7:12 AM        Failed - PHQ-9 score of less than 5 in past 6 months     Please review last PHQ-9 score.           Passed - Blood pressure under 140/90 in past 12 months     BP Readings from Last 3 Encounters:   03/15/22 123/80   08/19/21 130/89   05/24/21 122/84                 Passed - Medication is active on med list        Passed - Patient is age 18 or older        Passed - No active pregnancy on record        Passed - No positive pregnancy test in past 12 months        " "Passed - Recent (6 mo) or future (30 days) visit within the authorizing provider's specialty     Patient had office visit in the last 6 months or has a visit in the next 30 days with authorizing provider or within the authorizing provider's specialty.  See \"Patient Info\" tab in inbasket, or \"Choose Columns\" in Meds & Orders section of the refill encounter.                 Jarett Connelly RN 06/30/22 7:14 AM  "

## 2022-07-01 RX ORDER — ATORVASTATIN CALCIUM 40 MG/1
TABLET, FILM COATED ORAL
Qty: 90 TABLET | Refills: 4 | Status: SHIPPED | OUTPATIENT
Start: 2022-07-01 | End: 2023-07-10

## 2022-07-01 NOTE — TELEPHONE ENCOUNTER
Due for colonoscopy and lab. Please schedule.    Future Appointments   Date Time Provider Department Center   7/15/2022  8:00 AM [facility] PFT RM 1 [facility]   7/15/2022  9:30 AM Ana [facility]jivar MD [facility]      Health Maintenance Due   Topic Date Due     DIABETIC FOOT EXAM  Never done     ASTHMA ACTION PLAN  Never done     DEPRESSION ACTION PLAN  Never done     EYE EXAM  Never done     COLORECTAL CANCER SCREENING  Never done     HEPATITIS B IMMUNIZATION (1 of 3 - Risk 3-dose series) Never done     Pneumococcal Vaccine: Pediatrics (0 to 5 Years) and At-Risk Patients (6 to 64 Years) (2 - PCV) 10/18/2011     MEDICARE ANNUAL WELLNESS VISIT  05/24/2022     LIPID  05/24/2022     ZOSTER IMMUNIZATION (2 of 2) 05/10/2022     BP Readings from Last 3 Encounters:   03/15/22 123/80   08/19/21 130/89   05/24/21 122/84     Rebecca was seen today for refill request.    Diagnoses and all orders for this visit:    Hypercholesterolemia  -     Lipid panel reflex to direct LDL Fasting; Future    FOX (generalized anxiety disorder)    Moderate single current episode of major depressive disorder (H)    Type 2 diabetes mellitus without complication, without long-term current use of insulin (H)  -     atorvastatin (LIPITOR) 40 MG tablet; [ATORVASTATIN (LIPITOR) 40 MG TABLET] TAKE ONE TABLET BY MOUTH ONE TIME DAILY  -     Hemoglobin A1c; Future  -     Comprehensive metabolic panel (BMP + Alb, Alk Phos, ALT, AST, Total. Bili, TP); Future  -     TSH with free T4 reflex; Future  -     Adult Eye Referral; Future    Colon cancer screening  -     Colonscopy Screening  Referral; Future    Visit for screening mammogram    Other orders  -     ZOSTER VACCINE RECOMBINANT ADJUVANTED IM NJX; Future

## 2022-08-04 ENCOUNTER — TELEPHONE (OUTPATIENT)
Dept: FAMILY MEDICINE | Facility: CLINIC | Age: 57
End: 2022-08-04

## 2022-08-04 DIAGNOSIS — M17.10 UNILATERAL PRIMARY OSTEOARTHRITIS, UNSPECIFIED KNEE: ICD-10-CM

## 2022-08-04 DIAGNOSIS — F11.90 CHRONIC, CONTINUOUS USE OF OPIOIDS: ICD-10-CM

## 2022-08-04 NOTE — TELEPHONE ENCOUNTER
Reason for Call:  Medication or medication refill:    Do you use a [facility]?  Name of the pharmacy and phone number for the current request:  [facility] pharmacy     Name of the medication requested: HYDROcodone-acetaminophen (NORCO) 7.5-325 MG per tablet    Other request: Pt requesting opioid refill, please look into request and order med if applicable. Thank you.     Can we leave a detailed message on this number? YES    Phone number patient can be reached at: Home number on file [phone] (home)    Best Time: anytime     Call taken on 8/4/2022 at 1:00 PM by Carlitos Vizcarra

## 2022-08-05 RX ORDER — HYDROCODONE BITARTRATE AND ACETAMINOPHEN 7.5; 325 MG/1; MG/1
1 TABLET ORAL 3 TIMES DAILY PRN
Qty: 90 TABLET | Refills: 0 | Status: SHIPPED | OUTPATIENT
Start: 2022-08-05 | End: 2022-08-31

## 2022-08-05 NOTE — TELEPHONE ENCOUNTER
Pt calling back to check the status of refill. Pt is completely out of it, please send Rx to pharmacy on file. Thank you!

## 2022-08-30 DIAGNOSIS — M17.10 UNILATERAL PRIMARY OSTEOARTHRITIS, UNSPECIFIED KNEE: ICD-10-CM

## 2022-08-30 DIAGNOSIS — F11.90 CHRONIC, CONTINUOUS USE OF OPIOIDS: ICD-10-CM

## 2022-08-31 RX ORDER — HYDROCODONE BITARTRATE AND ACETAMINOPHEN 7.5; 325 MG/1; MG/1
1 TABLET ORAL 3 TIMES DAILY PRN
Qty: 90 TABLET | Refills: 0 | Status: SHIPPED | OUTPATIENT
Start: 2022-08-31 | End: 2022-10-07

## 2022-09-01 NOTE — TELEPHONE ENCOUNTER
We saw you in March.  Since then, 8 no shows.  Need to come to clinic.    I will refill now, but your diabetes is not controlled and we cannot continue this way.

## 2022-09-01 NOTE — TELEPHONE ENCOUNTER
Called and left message#1 for patient to return call to clinic for test results. Okay to relay message

## 2022-09-27 DIAGNOSIS — E11.9 TYPE 2 DIABETES MELLITUS WITHOUT COMPLICATION, WITHOUT LONG-TERM CURRENT USE OF INSULIN (H): ICD-10-CM

## 2022-09-27 RX ORDER — DAPAGLIFLOZIN 10 MG/1
10 TABLET, FILM COATED ORAL DAILY
Qty: 90 TABLET | Refills: 0 | Status: CANCELLED | OUTPATIENT
Start: 2022-09-27

## 2022-09-28 DIAGNOSIS — E11.9 TYPE 2 DIABETES MELLITUS WITHOUT COMPLICATION, WITHOUT LONG-TERM CURRENT USE OF INSULIN (H): ICD-10-CM

## 2022-09-28 NOTE — TELEPHONE ENCOUNTER
"Routing refill request to provider for review/approval because:  Labs not current:  a1c  cr    Last Written Prescription Date:  5/27/22  Last Fill Quantity: 90,  # refills: 0   Last office visit provider:  3/15/22     Requested Prescriptions   Pending Prescriptions Disp Refills     dapagliflozin (FARXIGA) 10 MG TABS tablet 90 tablet 0     Sig: Take 1 tablet (10 mg) by mouth daily       Sodium Glucose Co-Transport Inhibitor Agents Failed - 9/27/2022  4:07 PM        Failed - Patient has documented A1c within the specified period of time.     If HgbA1C is 8 or greater, it needs to be on file within the past 3 months.  If less than 8, must be on file within the past 6 months.     Recent Labs   Lab Test 03/15/22  1507   A1C 11.8*             Failed - No creatinine >1.4 or GFR <45 within the past 12 mos     Recent Labs   Lab Test 08/19/21  1807 05/24/21  1221   GFRESTIMATED 69 >60   GFRESTBLACK  --  >60       Recent Labs   Lab Test 08/19/21  1807   CR 0.92             Failed - Patient has documented normal Potassium within the last 12 mos.     Recent Labs   Lab Test 08/19/21  1807   POTASSIUM 3.9             Passed - Medication is active on med list        Passed - Patient is age 18 or older        Passed - Patient is not pregnant        Passed - Patient has no positive pregnancy test within the past 12 mos.        Passed - Recent (6 mo) or future (30 days) visit within the authorizing provider's specialty     Patient had office visit in the last 6 months or has a visit in the next 30 days with authorizing provider or within the authorizing provider's specialty.  See \"Patient Info\" tab in inbasket, or \"Choose Columns\" in Meds & Orders section of the refill encounter.                 Leann Nguyen RN 09/28/22 10:29 AM  "

## 2022-09-29 RX ORDER — DAPAGLIFLOZIN 10 MG/1
10 TABLET, FILM COATED ORAL DAILY
Qty: 90 TABLET | Refills: 0 | Status: SHIPPED | OUTPATIENT
Start: 2022-09-29 | End: 2023-01-03

## 2022-09-29 NOTE — TELEPHONE ENCOUNTER
"Routing refill request to provider for review/approval because:  Labs not current:  Multiple  Patient needs to be seen because it has been more than 6 months since last office visit.    Last Written Prescription Date:  5/27/22  Last Fill Quantity: 90,  # refills: 0   Last office visit provider:  3/15/22     Requested Prescriptions   Pending Prescriptions Disp Refills     dapagliflozin (FARXIGA) 10 MG TABS tablet 90 tablet 0     Sig: Take 1 tablet (10 mg) by mouth daily       Sodium Glucose Co-Transport Inhibitor Agents Failed - 9/29/2022  7:51 AM        Failed - Patient has documented A1c within the specified period of time.     If HgbA1C is 8 or greater, it needs to be on file within the past 3 months.  If less than 8, must be on file within the past 6 months.     Recent Labs   Lab Test 03/15/22  1507   A1C 11.8*             Failed - No creatinine >1.4 or GFR <45 within the past 12 mos     Recent Labs   Lab Test 08/19/21  1807 05/24/21  1221   GFRESTIMATED 69 >60   GFRESTBLACK  --  >60       Recent Labs   Lab Test 08/19/21  1807   CR 0.92             Failed - Patient has documented normal Potassium within the last 12 mos.     Recent Labs   Lab Test 08/19/21  1807   POTASSIUM 3.9             Passed - Medication is active on med list        Passed - Patient is age 18 or older        Passed - Patient is not pregnant        Passed - Patient has no positive pregnancy test within the past 12 mos.        Passed - Recent (6 mo) or future (30 days) visit within the authorizing provider's specialty     Patient had office visit in the last 6 months or has a visit in the next 30 days with authorizing provider or within the authorizing provider's specialty.  See \"Patient Info\" tab in inbasket, or \"Choose Columns\" in Meds & Orders section of the refill encounter.                 Jarett Connelly RN 09/29/22 7:51 AM  "

## 2022-10-05 DIAGNOSIS — M17.10 UNILATERAL PRIMARY OSTEOARTHRITIS, UNSPECIFIED KNEE: ICD-10-CM

## 2022-10-05 DIAGNOSIS — F11.90 CHRONIC, CONTINUOUS USE OF OPIOIDS: ICD-10-CM

## 2022-10-07 ENCOUNTER — TRANSFERRED RECORDS (OUTPATIENT)
Dept: HEALTH INFORMATION MANAGEMENT | Facility: CLINIC | Age: 57
End: 2022-10-07

## 2022-10-07 LAB — RETINOPATHY: NEGATIVE

## 2022-10-07 RX ORDER — HYDROCODONE BITARTRATE AND ACETAMINOPHEN 7.5; 325 MG/1; MG/1
1 TABLET ORAL 3 TIMES DAILY PRN
Qty: 90 TABLET | Refills: 0 | Status: SHIPPED | OUTPATIENT
Start: 2022-10-07 | End: 2022-11-03

## 2022-10-07 NOTE — TELEPHONE ENCOUNTER
Pt called back to follow up on HYDROcodone-acetaminophen (NORCO) 7.5-325 MG per tablet refill. Please look int refill request and order med if applicable. Thank you.

## 2022-10-26 ENCOUNTER — OFFICE VISIT (OUTPATIENT)
Dept: FAMILY MEDICINE | Facility: CLINIC | Age: 57
End: 2022-10-26
Payer: MEDICARE

## 2022-10-26 VITALS
WEIGHT: 226 LBS | DIASTOLIC BLOOD PRESSURE: 83 MMHG | BODY MASS INDEX: 35.66 KG/M2 | TEMPERATURE: 97.7 F | SYSTOLIC BLOOD PRESSURE: 127 MMHG | RESPIRATION RATE: 16 BRPM

## 2022-10-26 DIAGNOSIS — Z23 NEED FOR COVID-19 VACCINE: ICD-10-CM

## 2022-10-26 DIAGNOSIS — M79.645 THUMB PAIN, LEFT: ICD-10-CM

## 2022-10-26 DIAGNOSIS — R80.9 TYPE 2 DIABETES MELLITUS WITH MICROALBUMINURIA, WITHOUT LONG-TERM CURRENT USE OF INSULIN (H): Primary | ICD-10-CM

## 2022-10-26 DIAGNOSIS — J45.40 MODERATE PERSISTENT ASTHMA WITHOUT COMPLICATION: ICD-10-CM

## 2022-10-26 DIAGNOSIS — F42.9 OBSESSIVE-COMPULSIVE DISORDER, UNSPECIFIED TYPE: ICD-10-CM

## 2022-10-26 DIAGNOSIS — F32.1 MODERATE SINGLE CURRENT EPISODE OF MAJOR DEPRESSIVE DISORDER (H): ICD-10-CM

## 2022-10-26 DIAGNOSIS — R05.3 CHRONIC COUGH: ICD-10-CM

## 2022-10-26 DIAGNOSIS — F33.2 SEVERE EPISODE OF RECURRENT MAJOR DEPRESSIVE DISORDER, WITHOUT PSYCHOTIC FEATURES (H): ICD-10-CM

## 2022-10-26 DIAGNOSIS — Z12.11 COLON CANCER SCREENING: ICD-10-CM

## 2022-10-26 DIAGNOSIS — E11.29 TYPE 2 DIABETES MELLITUS WITH MICROALBUMINURIA, WITHOUT LONG-TERM CURRENT USE OF INSULIN (H): Primary | ICD-10-CM

## 2022-10-26 DIAGNOSIS — F43.10 POST TRAUMATIC STRESS DISORDER (PTSD): ICD-10-CM

## 2022-10-26 DIAGNOSIS — E78.00 PURE HYPERCHOLESTEROLEMIA: ICD-10-CM

## 2022-10-26 DIAGNOSIS — F41.0 PANIC DISORDER WITHOUT AGORAPHOBIA: ICD-10-CM

## 2022-10-26 DIAGNOSIS — E66.01 MORBID OBESITY (H): ICD-10-CM

## 2022-10-26 PROBLEM — F10.21 ALCOHOL DEPENDENCE IN REMISSION (H): Status: ACTIVE | Noted: 2022-10-26

## 2022-10-26 LAB
ALBUMIN SERPL BCG-MCNC: 4.3 G/DL (ref 3.5–5.2)
ALP SERPL-CCNC: 102 U/L (ref 35–104)
ALT SERPL W P-5'-P-CCNC: 16 U/L (ref 10–35)
ANION GAP SERPL CALCULATED.3IONS-SCNC: 12 MMOL/L (ref 7–15)
AST SERPL W P-5'-P-CCNC: 25 U/L (ref 10–35)
BILIRUB SERPL-MCNC: 0.7 MG/DL
BUN SERPL-MCNC: 19 MG/DL (ref 8–23)
CALCIUM SERPL-MCNC: 10.1 MG/DL (ref 8.6–10)
CHLORIDE SERPL-SCNC: 101 MMOL/L (ref 98–107)
CHOLEST SERPL-MCNC: 157 MG/DL
CREAT SERPL-MCNC: 0.69 MG/DL (ref 0.51–0.95)
DEPRECATED HCO3 PLAS-SCNC: 25 MMOL/L (ref 22–29)
GFR SERPL CREATININE-BSD FRML MDRD: >90 ML/MIN/1.73M2
GLUCOSE SERPL-MCNC: 166 MG/DL (ref 70–99)
HBA1C MFR BLD: 7.8 % (ref 0–5.6)
HDLC SERPL-MCNC: 48 MG/DL
LDLC SERPL CALC-MCNC: 73 MG/DL
NONHDLC SERPL-MCNC: 109 MG/DL
POTASSIUM SERPL-SCNC: 4.4 MMOL/L (ref 3.4–5.3)
PROT SERPL-MCNC: 7.4 G/DL (ref 6.4–8.3)
SODIUM SERPL-SCNC: 138 MMOL/L (ref 136–145)
TRIGL SERPL-MCNC: 182 MG/DL
TSH SERPL DL<=0.005 MIU/L-ACNC: 0.76 UIU/ML (ref 0.3–4.2)

## 2022-10-26 PROCEDURE — 80053 COMPREHEN METABOLIC PANEL: CPT | Performed by: FAMILY MEDICINE

## 2022-10-26 PROCEDURE — G0008 ADMIN INFLUENZA VIRUS VAC: HCPCS | Performed by: FAMILY MEDICINE

## 2022-10-26 PROCEDURE — 91313 COVID-19,PF,MODERNA BIVALENT: CPT | Performed by: FAMILY MEDICINE

## 2022-10-26 PROCEDURE — 84443 ASSAY THYROID STIM HORMONE: CPT | Performed by: FAMILY MEDICINE

## 2022-10-26 PROCEDURE — 83036 HEMOGLOBIN GLYCOSYLATED A1C: CPT | Performed by: FAMILY MEDICINE

## 2022-10-26 PROCEDURE — 36415 COLL VENOUS BLD VENIPUNCTURE: CPT | Performed by: FAMILY MEDICINE

## 2022-10-26 PROCEDURE — 96127 BRIEF EMOTIONAL/BEHAV ASSMT: CPT | Performed by: FAMILY MEDICINE

## 2022-10-26 PROCEDURE — 0134A COVID-19,PF,MODERNA BIVALENT: CPT | Performed by: FAMILY MEDICINE

## 2022-10-26 PROCEDURE — 90682 RIV4 VACC RECOMBINANT DNA IM: CPT | Performed by: FAMILY MEDICINE

## 2022-10-26 PROCEDURE — 99214 OFFICE O/P EST MOD 30 MIN: CPT | Mod: 25 | Performed by: FAMILY MEDICINE

## 2022-10-26 PROCEDURE — 80061 LIPID PANEL: CPT | Performed by: FAMILY MEDICINE

## 2022-10-26 RX ORDER — METHYLPREDNISOLONE ACETATE 80 MG/ML
80 INJECTION, SUSPENSION INTRA-ARTICULAR; INTRALESIONAL; INTRAMUSCULAR; SOFT TISSUE ONCE
Status: CANCELLED | OUTPATIENT
Start: 2022-10-26 | End: 2022-10-26

## 2022-10-26 ASSESSMENT — ASTHMA QUESTIONNAIRES
ACT_TOTALSCORE: 18
QUESTION_5 LAST FOUR WEEKS HOW WOULD YOU RATE YOUR ASTHMA CONTROL: POORLY CONTROLLED
ACT_TOTALSCORE: 15
QUESTION_2 LAST FOUR WEEKS HOW OFTEN HAVE YOU HAD SHORTNESS OF BREATH: ONCE OR TWICE A WEEK
QUESTION_5 LAST FOUR WEEKS HOW WOULD YOU RATE YOUR ASTHMA CONTROL: POORLY CONTROLLED
QUESTION_4 LAST FOUR WEEKS HOW OFTEN HAVE YOU USED YOUR RESCUE INHALER OR NEBULIZER MEDICATION (SUCH AS ALBUTEROL): TWO OR THREE TIMES PER WEEK
QUESTION_3 LAST FOUR WEEKS HOW OFTEN DID YOUR ASTHMA SYMPTOMS (WHEEZING, COUGHING, SHORTNESS OF BREATH, CHEST TIGHTNESS OR PAIN) WAKE YOU UP AT NIGHT OR EARLIER THAN USUAL IN THE MORNING: ONCE OR TWICE
QUESTION_1 LAST FOUR WEEKS HOW MUCH OF THE TIME DID YOUR ASTHMA KEEP YOU FROM GETTING AS MUCH DONE AT WORK, SCHOOL OR AT HOME: SOME OF THE TIME
QUESTION_1 LAST FOUR WEEKS HOW MUCH OF THE TIME DID YOUR ASTHMA KEEP YOU FROM GETTING AS MUCH DONE AT WORK, SCHOOL OR AT HOME: A LITTLE OF THE TIME
ACT_TOTALSCORE: 18
QUESTION_3 LAST FOUR WEEKS HOW OFTEN DID YOUR ASTHMA SYMPTOMS (WHEEZING, COUGHING, SHORTNESS OF BREATH, CHEST TIGHTNESS OR PAIN) WAKE YOU UP AT NIGHT OR EARLIER THAN USUAL IN THE MORNING: ONCE A WEEK
QUESTION_2 LAST FOUR WEEKS HOW OFTEN HAVE YOU HAD SHORTNESS OF BREATH: ONCE OR TWICE A WEEK
QUESTION_4 LAST FOUR WEEKS HOW OFTEN HAVE YOU USED YOUR RESCUE INHALER OR NEBULIZER MEDICATION (SUCH AS ALBUTEROL): ONCE A WEEK OR LESS

## 2022-10-26 ASSESSMENT — ANXIETY QUESTIONNAIRES
6. BECOMING EASILY ANNOYED OR IRRITABLE: NEARLY EVERY DAY
8. IF YOU CHECKED OFF ANY PROBLEMS, HOW DIFFICULT HAVE THESE MADE IT FOR YOU TO DO YOUR WORK, TAKE CARE OF THINGS AT HOME, OR GET ALONG WITH OTHER PEOPLE?: EXTREMELY DIFFICULT
5. BEING SO RESTLESS THAT IT IS HARD TO SIT STILL: NEARLY EVERY DAY
1. FEELING NERVOUS, ANXIOUS, OR ON EDGE: NEARLY EVERY DAY
7. FEELING AFRAID AS IF SOMETHING AWFUL MIGHT HAPPEN: NEARLY EVERY DAY
GAD7 TOTAL SCORE: 21
2. NOT BEING ABLE TO STOP OR CONTROL WORRYING: NEARLY EVERY DAY
IF YOU CHECKED OFF ANY PROBLEMS ON THIS QUESTIONNAIRE, HOW DIFFICULT HAVE THESE PROBLEMS MADE IT FOR YOU TO DO YOUR WORK, TAKE CARE OF THINGS AT HOME, OR GET ALONG WITH OTHER PEOPLE: EXTREMELY DIFFICULT
7. FEELING AFRAID AS IF SOMETHING AWFUL MIGHT HAPPEN: NEARLY EVERY DAY
GAD7 TOTAL SCORE: 21
7. FEELING AFRAID AS IF SOMETHING AWFUL MIGHT HAPPEN: NEARLY EVERY DAY
6. BECOMING EASILY ANNOYED OR IRRITABLE: NEARLY EVERY DAY
GAD7 TOTAL SCORE: 21
4. TROUBLE RELAXING: NEARLY EVERY DAY
IF YOU CHECKED OFF ANY PROBLEMS ON THIS QUESTIONNAIRE, HOW DIFFICULT HAVE THESE PROBLEMS MADE IT FOR YOU TO DO YOUR WORK, TAKE CARE OF THINGS AT HOME, OR GET ALONG WITH OTHER PEOPLE: EXTREMELY DIFFICULT
GAD7 TOTAL SCORE: 21
1. FEELING NERVOUS, ANXIOUS, OR ON EDGE: NEARLY EVERY DAY
3. WORRYING TOO MUCH ABOUT DIFFERENT THINGS: NEARLY EVERY DAY
3. WORRYING TOO MUCH ABOUT DIFFERENT THINGS: NEARLY EVERY DAY
5. BEING SO RESTLESS THAT IT IS HARD TO SIT STILL: NEARLY EVERY DAY
2. NOT BEING ABLE TO STOP OR CONTROL WORRYING: NEARLY EVERY DAY
4. TROUBLE RELAXING: NEARLY EVERY DAY

## 2022-10-26 ASSESSMENT — PATIENT HEALTH QUESTIONNAIRE - PHQ9
SUM OF ALL RESPONSES TO PHQ QUESTIONS 1-9: 26
SUM OF ALL RESPONSES TO PHQ QUESTIONS 1-9: 26
10. IF YOU CHECKED OFF ANY PROBLEMS, HOW DIFFICULT HAVE THESE PROBLEMS MADE IT FOR YOU TO DO YOUR WORK, TAKE CARE OF THINGS AT HOME, OR GET ALONG WITH OTHER PEOPLE: EXTREMELY DIFFICULT

## 2022-10-26 NOTE — PROGRESS NOTES
Assessment & Plan     Type 2 diabetes mellitus with microalbuminuria, without long-term current use of insulin (H)  Morbid obesity (H)  Hypercholesterolemia  Nice improvement in A1c.  We will try to continue his with her current medications, which are Farxiga, metformin.  On statin.  On aspirin.    Severe episode of recurrent major depressive disorder, without psychotic features (H)  Moderate single current episode of major depressive disorder (H)  Obsessive-compulsive disorder, unspecified type  Post traumatic stress disorder (PTSD)  Obsessive-compulsive disorder, unspecified type  Panic disorder without agoraphobia  Carrying many diagnoses.  Current medicine is mirtazapine, duloxetine.  Discussed her screening questionnaires in depth.  She does feel quite down but does not have active suicidal plans.  She is feeling particularly down due to just having to attend a  of one of her best friends.  Per our discussion I do not believe that she is a an imminent danger of self-harm.  Will continue her current medications and reach out to mental health care specialist to help get her not connected with specialty care.  This will be the fifth referral to mental health care specialist in the last 2 years.  Unfortunately I do not think she is connected with them.  - Adult Mental Health  Referral    Colon cancer screening  Discussed options.  Patient wanted to proceed with Cologuard rather than a colonoscopy.  - COLOGUARD([employer])    Moderate persistent asthma without complication  Chronic cough  Had a pulmonology appointment in July but she no showed that.  We will continue her medications.  I will recommend that she get some spirometry.  Perhaps this is COPD.  She is not currently smoking.  COVID-vaccine as below.  She is on metoprolol for blood pressure control.  - Adult Pulmonary Medicine Referral  - [facility] SPIROMETRY PRE/POST BRONCHOD    Need for COVID-19 vaccine  - COVID-19,PF,MODERNA BIVALENT  (18+YRS)    Left thumb pain for months.  Were not able to do an x-ray during this clinic visit but will have her follow-up for that.    Depression Screening Follow Up    PHQ 10/26/2022   PHQ-9 Total Score 26   Q9: Thoughts of better off dead/self-harm past 2 weeks Nearly every day   F/U: Thoughts of suicide or self-harm Yes   F/U: Self harm-plan Yes   F/U: Self-harm action Yes   F/U: Safety concerns Yes     Last PHQ-9 10/26/2022   1.  Little interest or pleasure in doing things 2   2.  Feeling down, depressed, or hopeless 3   3.  Trouble falling or staying asleep, or sleeping too much 3   4.  Feeling tired or having little energy 3   5.  Poor appetite or overeating 3   6.  Feeling bad about yourself 3   7.  Trouble concentrating 3   8.  Moving slowly or restless 3   Q9: Thoughts of better off dead/self-harm past 2 weeks 3   PHQ-9 Total Score 26   Difficulty at work, home, or with people -   In the past two weeks have you had thoughts of suicide or self harm? Yes   Do you have concerns about your personal safety or the safety of others? Yes   In the past 2 weeks have you thought about a plan or had intention to harm yourself? Yes   In the past 2 weeks have you acted on these thoughts in any way? Yes       Return in about 6 weeks (around 12/7/2022) for Follow up.    Aj Conrad MD  [facility]    Aditya Fernandez is a 57 year old, presenting for the following health issues:  Follow Up (diabetes) and Thumb Discomfort (Wants x-ray)    ACT Total Scores 3/15/2022 10/26/2022 10/26/2022   ACT TOTAL SCORE (Goal Greater than or Equal to 20) 15 15 18   In the past 12 months, how many times did you visit the emergency room for your asthma without being admitted to the hospital? 0 0 0   In the past 12 months, how many times were you hospitalized overnight because of your asthma? 0 0 0       History of Present Illness       Diabetes:   She presents for follow up of diabetes.  She is not checking  blood glucose. She is concerned about frequent infections, blood sugar frequently over 200 and low blood sugar, several less than 70 in the past few weeks. She is having redness, sores, or blisters on feet and blurry vision. The patient has had a diabetic eye exam in the last 12 months. Eye exam performed on 10/03/2022. Location of last eye exam [facility] eye [facility].        She eats 2-3 servings of fruits and vegetables daily.She consumes 2 sweetened beverage(s) daily.She exercises with enough effort to increase her heart rate 20 to 29 minutes per day.  She exercises with enough effort to increase her heart rate 4 days per week.   She is taking medications regularly.    Today's PHQ-9         PHQ-9 Total Score: 26    PHQ-9 Q9 Thoughts of better off dead/self-harm past 2 weeks :   Nearly every day  Thoughts of suicide or self harm: (P) Yes  Self-harm Plan:   (P) Yes  Self-harm Action:     (P) Yes  Safety concerns for self or others: (P) Yes    How difficult have these problems made it for you to do your work, take care of things at home, or get along with other people: Extremely difficult  Today's FOX-7 Score: 21     Patient also wanted help with a housing form.  I needed to sign off that I thought she was incapacitated or disabled.  I think that is true both from her mental health conditions and from musculoskeletal pain.  The form was completed and will be scanned to the chart.      Objective    /83 (BP Location: Left arm, Patient Position: Sitting, Cuff Size: Adult Large)   Temp 97.7  F (36.5  C) (Temporal)   Resp 16   Wt 102.5 kg (226 lb)   BMI 35.66 kg/m    Body mass index is 35.66 kg/m .  Physical Exam   GENERAL: alert, not distressed  CHEST: clear, no rales, rhonchi, or wheezes  CARDIAC: regular without murmur, gallop, or rub  MS: Restricted movement at left thumb MCP.  No triggering.  Circulatory, motor, and sensation all intact.  Tenderness around the MCP.    Results for orders placed or  performed in visit on 10/26/22   Hemoglobin A1c     Status: Abnormal   Result Value Ref Range    Hemoglobin A1C 7.8 (H) 0.0 - 5.6 %   Lipid panel reflex to direct LDL Fasting     Status: Abnormal   Result Value Ref Range    Cholesterol 157 <200 mg/dL    Triglycerides 182 (H) <150 mg/dL    Direct Measure HDL 48 (L) >=50 mg/dL    LDL Cholesterol Calculated 73 <=100 mg/dL    Non HDL Cholesterol 109 <130 mg/dL    Narrative    Cholesterol  Desirable:  <200 mg/dL    Triglycerides  Normal:  Less than 150 mg/dL  Borderline High:  150-199 mg/dL  High:  200-499 mg/dL  Very High:  Greater than or equal to 500 mg/dL    Direct Measure HDL  Female:  Greater than or equal to 50 mg/dL   Male:  Greater than or equal to 40 mg/dL    LDL Cholesterol  Desirable:  <100mg/dL  Above Desirable:  100-129 mg/dL   Borderline High:  130-159 mg/dL   High:  160-189 mg/dL   Very High:  >= 190 mg/dL    Non HDL Cholesterol  Desirable:  130 mg/dL  Above Desirable:  130-159 mg/dL  Borderline High:  160-189 mg/dL  High:  190-219 mg/dL  Very High:  Greater than or equal to 220 mg/dL   Comprehensive metabolic panel (BMP + Alb, Alk Phos, ALT, AST, Total. Bili, TP)     Status: Abnormal   Result Value Ref Range    Sodium 138 136 - 145 mmol/L    Potassium 4.4 3.4 - 5.3 mmol/L    Chloride 101 98 - 107 mmol/L    Carbon Dioxide (CO2) 25 22 - 29 mmol/L    Anion Gap 12 7 - 15 mmol/L    Urea Nitrogen 19.0 8.0 - 23.0 mg/dL    Creatinine 0.69 0.51 - 0.95 mg/dL    Calcium 10.1 (H) 8.6 - 10.0 mg/dL    Glucose 166 (H) 70 - 99 mg/dL    Alkaline Phosphatase 102 35 - 104 U/L    AST 25 10 - 35 U/L    ALT 16 10 - 35 U/L    Protein Total 7.4 6.4 - 8.3 g/dL    Albumin 4.3 3.5 - 5.2 g/dL    Bilirubin Total 0.7 <=1.2 mg/dL    GFR Estimate >90 >60 mL/min/1.73m2   TSH with free T4 reflex     Status: Normal   Result Value Ref Range    TSH 0.76 0.30 - 4.20 uIU/mL

## 2022-10-26 NOTE — LETTER
October 30, 2022      Rebecca P Smalls  [address]  [place] [address]        Dear ,    We are writing to inform you of your test results.    Results look ok.  A1c improved, so your diabetes control is better.  Let's add glimepiride to help get the A1c less than 7.  I will send a prescription.    Resulted Orders   Hemoglobin A1c   Result Value Ref Range    Hemoglobin A1C 7.8 (H) 0.0 - 5.6 %      Comment:      Normal <5.7%   Prediabetes 5.7-6.4%    Diabetes 6.5% or higher     Note: Adopted from ADA consensus guidelines.   Lipid panel reflex to direct LDL Fasting   Result Value Ref Range    Cholesterol 157 <200 mg/dL    Triglycerides 182 (H) <150 mg/dL    Direct Measure HDL 48 (L) >=50 mg/dL    LDL Cholesterol Calculated 73 <=100 mg/dL    Non HDL Cholesterol 109 <130 mg/dL    Narrative    Cholesterol  Desirable:  <200 mg/dL    Triglycerides  Normal:  Less than 150 mg/dL  Borderline High:  150-199 mg/dL  High:  200-499 mg/dL  Very High:  Greater than or equal to 500 mg/dL    Direct Measure HDL  Female:  Greater than or equal to 50 mg/dL   Male:  Greater than or equal to 40 mg/dL    LDL Cholesterol  Desirable:  <100mg/dL  Above Desirable:  100-129 mg/dL   Borderline High:  130-159 mg/dL   High:  160-189 mg/dL   Very High:  >= 190 mg/dL    Non HDL Cholesterol  Desirable:  130 mg/dL  Above Desirable:  130-159 mg/dL  Borderline High:  160-189 mg/dL  High:  190-219 mg/dL  Very High:  Greater than or equal to 220 mg/dL   Comprehensive metabolic panel (BMP + Alb, Alk Phos, ALT, AST, Total. Bili, TP)   Result Value Ref Range    Sodium 138 136 - 145 mmol/L    Potassium 4.4 3.4 - 5.3 mmol/L    Chloride 101 98 - 107 mmol/L    Carbon Dioxide (CO2) 25 22 - 29 mmol/L    Anion Gap 12 7 - 15 mmol/L    Urea Nitrogen 19.0 8.0 - 23.0 mg/dL    Creatinine 0.69 0.51 - 0.95 mg/dL    Calcium 10.1 (H) 8.6 - 10.0 mg/dL    Glucose 166 (H) 70 - 99 mg/dL    Alkaline Phosphatase 102 35 - 104 U/L    AST 25 10 - 35 U/L    ALT 16  10 - 35 U/L    Protein Total 7.4 6.4 - 8.3 g/dL    Albumin 4.3 3.5 - 5.2 g/dL    Bilirubin Total 0.7 <=1.2 mg/dL    GFR Estimate >90 >60 mL/min/1.73m2      Comment:      Effective December 21, 2021 eGFRcr in adults is calculated using the 2021 CKD-EPI creatinine equation which includes age and gender (Regino et al., NEJM, DOI: 10.1056/SNMFwt4737173)   TSH with free T4 reflex   Result Value Ref Range    TSH 0.76 0.30 - 4.20 uIU/mL       If you have any questions or concerns, please call the clinic at the number listed above.       Sincerely,      Michelle Martínez MD

## 2022-10-30 RX ORDER — GLIMEPIRIDE 2 MG/1
2 TABLET ORAL
Qty: 90 TABLET | Refills: 0 | Status: SHIPPED | OUTPATIENT
Start: 2022-10-30 | End: 2023-02-10

## 2022-11-02 ENCOUNTER — TELEPHONE (OUTPATIENT)
Dept: FAMILY MEDICINE | Facility: CLINIC | Age: 57
End: 2022-11-02

## 2022-11-02 DIAGNOSIS — M17.10 UNILATERAL PRIMARY OSTEOARTHRITIS, UNSPECIFIED KNEE: ICD-10-CM

## 2022-11-02 DIAGNOSIS — F11.90 CHRONIC, CONTINUOUS USE OF OPIOIDS: ICD-10-CM

## 2022-11-02 NOTE — TELEPHONE ENCOUNTER
----- Message from Aj Conrad MD sent at 10/30/2022  7:38 AM CDT -----  Call:  Results look ok.  A1c improved, so your diabetes control is better.  Let's add glimepiride to help get the A1c less than 7.  I will send a prescription.

## 2022-11-02 NOTE — TELEPHONE ENCOUNTER
Medication Question or Refill    Contacts       Type Contact Phone/Fax    11/02/2022 01:56 PM CDT Phone (Incoming) Slim Rebecca BLAKE (Self) [phone] (M)          What medication are you calling about (include dose and sig)?: HYDROcodone-acetaminophen (NORCO) 7.5-325 MG per tablet    Controlled Substance Agreement on file:   CSA -- Patient Level:     [Media Unavailable] Controlled Substance Agreement - Opioid - Scan on 3/15/2022  5:09 PM   [Media Unavailable] Controlled Substance Agreement - Opioid - Scan on 11/6/2020   [Media Unavailable] Controlled Substance Agreement - Non - Opioid - Scan on 6/11/2019   [Media Unavailable] Controlled Substance Agreement - Opioid - Scan on 6/5/2019       Who prescribed the medication?: valentin    Do you need a refill? Yes: has 3 pills left    When did you use the medication last? 11/02/22    Patient offered an appointment? No    Do you have any questions or concerns?  No    Preferred Pharmacy:   [id number] - [place], [place] - [address] [place] [address]  Phone: [phone] Fax: [phone]      Okay to leave a detailed message?: Yes at Cell number on file:    Telephone Information:   Mobile [phone]

## 2022-11-03 RX ORDER — HYDROCODONE BITARTRATE AND ACETAMINOPHEN 7.5; 325 MG/1; MG/1
1 TABLET ORAL 3 TIMES DAILY PRN
Qty: 90 TABLET | Refills: 0 | Status: SHIPPED | OUTPATIENT
Start: 2022-11-03 | End: 2022-12-09

## 2022-11-03 NOTE — TELEPHONE ENCOUNTER
Pt calling back to check on  The status of this refill. Pt has 2 pills left. Please send RX to pharmacy on file. Thanks!

## 2022-11-04 DIAGNOSIS — I10 ESSENTIAL HYPERTENSION: ICD-10-CM

## 2022-11-06 RX ORDER — AMLODIPINE BESYLATE 5 MG/1
5 TABLET ORAL DAILY
Qty: 90 TABLET | Refills: 3 | Status: SHIPPED | OUTPATIENT
Start: 2022-11-06 | End: 2023-12-29

## 2022-11-06 NOTE — TELEPHONE ENCOUNTER
"Last Written Prescription Date:  05/26/2022  Last Fill Quantity: 90,  # refills: 1   Last office visit provider:   10/26/2022    Requested Prescriptions   Pending Prescriptions Disp Refills     amLODIPine (NORVASC) 5 MG tablet 90 tablet 1     Sig: Take 1 tablet (5 mg) by mouth daily       Calcium Channel Blockers Protocol  Passed - 11/4/2022  1:34 PM        Passed - Blood pressure under 140/90 in past 12 months     BP Readings from Last 3 Encounters:   10/26/22 127/83   03/15/22 123/80   08/19/21 130/89                 Passed - Recent (12 mo) or future (30 days) visit within the authorizing provider's specialty     Patient has had an office visit with the authorizing provider or a provider within the authorizing providers department within the previous 12 mos or has a future within next 30 days. See \"Patient Info\" tab in inbasket, or \"Choose Columns\" in Meds & Orders section of the refill encounter.              Passed - Medication is active on med list        Passed - Patient is age 18 or older        Passed - No active pregnancy on record        Passed - Normal serum creatinine on file in past 12 months     Recent Labs   Lab Test 10/26/22  1614   CR 0.69       Ok to refill medication if creatinine is low          Passed - No positive pregnancy test in past 12 months             Che Flores 11/06/22 2:49 PM  "

## 2022-12-13 NOTE — TELEPHONE ENCOUNTER
Please advise   What Type Of Note Output Would You Prefer (Optional)?: Standard Output How Severe Is Your Skin Lesion?: mild Has Your Skin Lesion Been Treated?: not been treated Is This A New Presentation, Or A Follow-Up?: Skin Lesions

## 2023-01-03 DIAGNOSIS — E11.9 TYPE 2 DIABETES MELLITUS WITHOUT COMPLICATION, WITHOUT LONG-TERM CURRENT USE OF INSULIN (H): ICD-10-CM

## 2023-01-03 DIAGNOSIS — F11.90 CHRONIC, CONTINUOUS USE OF OPIOIDS: ICD-10-CM

## 2023-01-03 DIAGNOSIS — M17.10 UNILATERAL PRIMARY OSTEOARTHRITIS, UNSPECIFIED KNEE: ICD-10-CM

## 2023-01-03 RX ORDER — DAPAGLIFLOZIN 10 MG/1
TABLET, FILM COATED ORAL
Qty: 90 TABLET | Refills: 0 | Status: SHIPPED | OUTPATIENT
Start: 2023-01-03 | End: 2023-04-09

## 2023-01-03 RX ORDER — HYDROCODONE BITARTRATE AND ACETAMINOPHEN 7.5; 325 MG/1; MG/1
TABLET ORAL
Qty: 90 TABLET | Refills: 0 | Status: SHIPPED | OUTPATIENT
Start: 2023-01-06 | End: 2023-01-13

## 2023-01-09 NOTE — TELEPHONE ENCOUNTER
Pt is scheduled on 2/06/2023, can shew get a refill now as she is out? I do see a refill sent to pharmacy on 1/06 but there is a error so not sure if it actually got sent or not. Please call and advise, thanks.

## 2023-01-10 NOTE — TELEPHONE ENCOUNTER
Pt called back asking for the status of this refill. I called [facility] and they said pt has a new health insurance this year of the month, which is [employer]. [employer] will not cover for the full prescription of 90 days, they will only cover for 7 days until PCP writes a PA. Please write a PA to the pharmacy, thanks!

## 2023-01-10 NOTE — TELEPHONE ENCOUNTER
Pt is calling back.  New insurance will not pay for 30 days supply only 7 days.      Needs a PA - does PCP agree can send PA?  Pharmacist tried to override it but no can do.  Thanks    Call taken on 1/10/23 at 451 pm by Jerri Diaz CMA TC

## 2023-01-11 ENCOUNTER — TELEPHONE (OUTPATIENT)
Dept: FAMILY MEDICINE | Facility: CLINIC | Age: 58
End: 2023-01-11

## 2023-01-11 NOTE — TELEPHONE ENCOUNTER
Pt calling back to check on the status of this refill as she is experiencing a lot of pain on her legs and need the medicine. Please fax PA to pharmacy, thanks!

## 2023-01-12 RX ORDER — HYDROCODONE BITARTRATE AND ACETAMINOPHEN 7.5; 325 MG/1; MG/1
3 TABLET ORAL 3 TIMES DAILY PRN
Qty: 21 TABLET | Refills: 0 | OUTPATIENT
Start: 2023-01-12 | End: 2023-01-19

## 2023-01-13 RX ORDER — HYDROCODONE BITARTRATE AND ACETAMINOPHEN 7.5; 325 MG/1; MG/1
1 TABLET ORAL 3 TIMES DAILY PRN
Qty: 21 TABLET | Refills: 0 | Status: SHIPPED | OUTPATIENT
Start: 2023-01-13 | End: 2023-02-09

## 2023-01-16 NOTE — TELEPHONE ENCOUNTER
Central Prior Authorization Team  Phone: [phone]    Prior Authorization Not Needed per Insurance    Medication: HYDROcodone-acetaminophen (NORCO) 7.5-325 MG per tablet  Insurance Company: [employer] - Phone [phone] Fax [phone]  Expected CoPay:      Pharmacy Filling the Rx: [id number] - [place], [place] - [address]  Pharmacy Notified: Yes  Patient Notified:      WILL UPDATE PHARMACY IN THE MORNING

## 2023-02-07 DIAGNOSIS — M17.10 UNILATERAL PRIMARY OSTEOARTHRITIS, UNSPECIFIED KNEE: ICD-10-CM

## 2023-02-07 DIAGNOSIS — F11.90 CHRONIC, CONTINUOUS USE OF OPIOIDS: ICD-10-CM

## 2023-02-07 NOTE — TELEPHONE ENCOUNTER
Medication Question or Refill        What medication are you calling about (include dose and sig)?: HYDROcodone-acetaminophen (NORCO) 7.5-325 MG per tablet    Preferred Pharmacy:   [id number] - [place], [place] - [address] [place] [address]  Phone: [phone] Fax: [phone]      Controlled Substance Agreement on file:   CSA -- Patient Level:     [Media Unavailable] Controlled Substance Agreement - Opioid - Scan on 3/15/2022  5:09 PM   [Media Unavailable] Controlled Substance Agreement - Opioid - Scan on 11/6/2020   [Media Unavailable] Controlled Substance Agreement - Non - Opioid - Scan on 6/11/2019   [Media Unavailable] Controlled Substance Agreement - Opioid - Scan on 6/5/2019       Who prescribed the medication?: PCP    Do you need a refill? Yes, pt called in to request a refill on her medication. Please look into this request and advise, send over to pharmacy if applicable.  Thanks!    When did you use the medication last? N/A    Patient offered an appointment? No, pt already has an appt scheduled on 3/6/23 with pcp    Do you have any questions or concerns?  No      Okay to leave a detailed message?: Yes at Home number on file [phone] (home)

## 2023-02-09 DIAGNOSIS — E11.29 TYPE 2 DIABETES MELLITUS WITH MICROALBUMINURIA, WITHOUT LONG-TERM CURRENT USE OF INSULIN (H): ICD-10-CM

## 2023-02-09 DIAGNOSIS — R80.9 TYPE 2 DIABETES MELLITUS WITH MICROALBUMINURIA, WITHOUT LONG-TERM CURRENT USE OF INSULIN (H): ICD-10-CM

## 2023-02-09 RX ORDER — HYDROCODONE BITARTRATE AND ACETAMINOPHEN 7.5; 325 MG/1; MG/1
1 TABLET ORAL 3 TIMES DAILY PRN
Qty: 21 TABLET | Refills: 0 | Status: CANCELLED | OUTPATIENT
Start: 2023-02-09

## 2023-02-09 RX ORDER — HYDROCODONE BITARTRATE AND ACETAMINOPHEN 7.5; 325 MG/1; MG/1
1 TABLET ORAL 3 TIMES DAILY PRN
Qty: 21 TABLET | Refills: 0 | Status: SHIPPED | OUTPATIENT
Start: 2023-02-09 | End: 2023-03-06

## 2023-02-09 NOTE — TELEPHONE ENCOUNTER
Pt calling back to follow up on refill request on med. Per pt, she is out of med and in need of it. Please look into request and order med if applicable. If unable to order med, please follow back up with pt to advise. Thank you.

## 2023-02-10 RX ORDER — GLIMEPIRIDE 2 MG/1
TABLET ORAL
Qty: 90 TABLET | Refills: 0 | Status: SHIPPED | OUTPATIENT
Start: 2023-02-10 | End: 2023-05-15

## 2023-02-10 NOTE — TELEPHONE ENCOUNTER
"Last Written Prescription Date:  10/30/22  Last Fill Quantity: 90,  # refills: 0   Last office visit provider:  10/26/22     Requested Prescriptions   Pending Prescriptions Disp Refills     glimepiride (AMARYL) 2 MG tablet [Pharmacy Med Name: Glimepiride Oral Tablet 2 MG] 90 tablet 0     Sig: Take 1 tablet by mouth every morning (before breakfast)       Sulfonylurea Agents Passed - 2/9/2023  2:43 PM        Passed - Patient has documented A1c within the specified period of time.     If HgbA1C is 8 or greater, it needs to be on file within the past 3 months.  If less than 8, must be on file within the past 6 months.     Recent Labs   Lab Test 10/26/22  1614   A1C 7.8*             Passed - Medication is active on med list        Passed - Patient is age 18 or older        Passed - No active pregnancy on record        Passed - Patient has a recent creatinine (normal) within the past 12 mos.     Recent Labs   Lab Test 10/26/22  1614   CR 0.69       Ok to refill medication if creatinine is low          Passed - Patient has not had a positive pregnancy test within the past 12 mos.        Passed - Recent (6 mo) or future (30 days) visit within the authorizing provider's specialty     Patient had office visit in the last 6 months or has a visit in the next 30 days with authorizing provider or within the authorizing provider's specialty.  See \"Patient Info\" tab in inbasket, or \"Choose Columns\" in Meds & Orders section of the refill encounter.                 Leann Nguyen RN 02/10/23 2:14 PM  "

## 2023-03-06 ENCOUNTER — OFFICE VISIT (OUTPATIENT)
Dept: FAMILY MEDICINE | Facility: CLINIC | Age: 58
End: 2023-03-06
Payer: COMMERCIAL

## 2023-03-06 ENCOUNTER — HOSPITAL ENCOUNTER (OUTPATIENT)
Facility: CLINIC | Age: 58
Discharge: HOME | End: 2023-03-06
Attending: FAMILY MEDICINE | Admitting: FAMILY MEDICINE
Payer: COMMERCIAL

## 2023-03-06 VITALS
BODY MASS INDEX: 38.97 KG/M2 | HEIGHT: 66 IN | HEART RATE: 100 BPM | TEMPERATURE: 97.2 F | DIASTOLIC BLOOD PRESSURE: 80 MMHG | OXYGEN SATURATION: 97 % | SYSTOLIC BLOOD PRESSURE: 124 MMHG | WEIGHT: 242.5 LBS

## 2023-03-06 DIAGNOSIS — Z12.31 VISIT FOR SCREENING MAMMOGRAM: ICD-10-CM

## 2023-03-06 DIAGNOSIS — F43.10 POST TRAUMATIC STRESS DISORDER (PTSD): ICD-10-CM

## 2023-03-06 DIAGNOSIS — F11.90 CHRONIC, CONTINUOUS USE OF OPIOIDS: ICD-10-CM

## 2023-03-06 DIAGNOSIS — F33.2 SEVERE EPISODE OF RECURRENT MAJOR DEPRESSIVE DISORDER, WITHOUT PSYCHOTIC FEATURES (H): ICD-10-CM

## 2023-03-06 DIAGNOSIS — M17.10 UNILATERAL PRIMARY OSTEOARTHRITIS, UNSPECIFIED KNEE: ICD-10-CM

## 2023-03-06 DIAGNOSIS — F42.9 OBSESSIVE-COMPULSIVE DISORDER, UNSPECIFIED TYPE: ICD-10-CM

## 2023-03-06 DIAGNOSIS — Z12.11 SCREEN FOR COLON CANCER: ICD-10-CM

## 2023-03-06 DIAGNOSIS — E11.9 TYPE 2 DIABETES MELLITUS WITHOUT COMPLICATION, WITHOUT LONG-TERM CURRENT USE OF INSULIN (H): Primary | ICD-10-CM

## 2023-03-06 DIAGNOSIS — L30.9 ECZEMA, UNSPECIFIED TYPE: ICD-10-CM

## 2023-03-06 DIAGNOSIS — F10.11 ALCOHOL ABUSE, IN REMISSION: ICD-10-CM

## 2023-03-06 DIAGNOSIS — Z12.11 COLON CANCER SCREENING: ICD-10-CM

## 2023-03-06 LAB
ANION GAP SERPL CALCULATED.3IONS-SCNC: 14 MMOL/L (ref 7–15)
BUN SERPL-MCNC: 14.1 MG/DL (ref 8–23)
CALCIUM SERPL-MCNC: 9.2 MG/DL (ref 8.6–10)
CHLORIDE SERPL-SCNC: 104 MMOL/L (ref 98–107)
CREAT SERPL-MCNC: 0.66 MG/DL (ref 0.51–0.95)
DEPRECATED HCO3 PLAS-SCNC: 22 MMOL/L (ref 22–29)
GFR SERPL CREATININE-BSD FRML MDRD: >90 ML/MIN/1.73M2
GLUCOSE SERPL-MCNC: 136 MG/DL (ref 70–99)
POTASSIUM SERPL-SCNC: 4.1 MMOL/L (ref 3.4–5.3)
SODIUM SERPL-SCNC: 140 MMOL/L (ref 136–145)

## 2023-03-06 PROCEDURE — 99214 OFFICE O/P EST MOD 30 MIN: CPT | Mod: 25 | Performed by: FAMILY MEDICINE

## 2023-03-06 PROCEDURE — 90750 HZV VACC RECOMBINANT IM: CPT | Performed by: FAMILY MEDICINE

## 2023-03-06 PROCEDURE — 36415 COLL VENOUS BLD VENIPUNCTURE: CPT | Performed by: FAMILY MEDICINE

## 2023-03-06 PROCEDURE — 82043 UR ALBUMIN QUANTITATIVE: CPT

## 2023-03-06 PROCEDURE — 90677 PCV20 VACCINE IM: CPT | Performed by: FAMILY MEDICINE

## 2023-03-06 PROCEDURE — G0009 ADMIN PNEUMOCOCCAL VACCINE: HCPCS | Performed by: FAMILY MEDICINE

## 2023-03-06 PROCEDURE — 82570 ASSAY OF URINE CREATININE: CPT

## 2023-03-06 PROCEDURE — 80048 BASIC METABOLIC PNL TOTAL CA: CPT

## 2023-03-06 PROCEDURE — 90471 IMMUNIZATION ADMIN: CPT | Performed by: FAMILY MEDICINE

## 2023-03-06 RX ORDER — TRIAMCINOLONE ACETONIDE 1 MG/G
CREAM TOPICAL 2 TIMES DAILY
Qty: 80 G | Refills: 0 | Status: SHIPPED | OUTPATIENT
Start: 2023-03-06 | End: 2023-04-14

## 2023-03-06 RX ORDER — HYDROCODONE BITARTRATE AND ACETAMINOPHEN 7.5; 325 MG/1; MG/1
1 TABLET ORAL 3 TIMES DAILY PRN
Qty: 90 TABLET | Refills: 0 | Status: SHIPPED | OUTPATIENT
Start: 2023-03-06 | End: 2023-04-09

## 2023-03-06 ASSESSMENT — ASTHMA QUESTIONNAIRES
ACT_TOTALSCORE: 24
QUESTION_4 LAST FOUR WEEKS HOW OFTEN HAVE YOU USED YOUR RESCUE INHALER OR NEBULIZER MEDICATION (SUCH AS ALBUTEROL): NOT AT ALL
QUESTION_5 LAST FOUR WEEKS HOW WOULD YOU RATE YOUR ASTHMA CONTROL: WELL CONTROLLED
ACT_TOTALSCORE: 24
HOSPITALIZATION_OVERNIGHT_LAST_YEAR_TOTAL: ONE
QUESTION_2 LAST FOUR WEEKS HOW OFTEN HAVE YOU HAD SHORTNESS OF BREATH: NOT AT ALL
EMERGENCY_ROOM_LAST_YEAR_TOTAL: ONE
QUESTION_3 LAST FOUR WEEKS HOW OFTEN DID YOUR ASTHMA SYMPTOMS (WHEEZING, COUGHING, SHORTNESS OF BREATH, CHEST TIGHTNESS OR PAIN) WAKE YOU UP AT NIGHT OR EARLIER THAN USUAL IN THE MORNING: NOT AT ALL
QUESTION_1 LAST FOUR WEEKS HOW MUCH OF THE TIME DID YOUR ASTHMA KEEP YOU FROM GETTING AS MUCH DONE AT WORK, SCHOOL OR AT HOME: NONE OF THE TIME

## 2023-03-06 ASSESSMENT — ANXIETY QUESTIONNAIRES
4. TROUBLE RELAXING: SEVERAL DAYS
6. BECOMING EASILY ANNOYED OR IRRITABLE: MORE THAN HALF THE DAYS
8. IF YOU CHECKED OFF ANY PROBLEMS, HOW DIFFICULT HAVE THESE MADE IT FOR YOU TO DO YOUR WORK, TAKE CARE OF THINGS AT HOME, OR GET ALONG WITH OTHER PEOPLE?: VERY DIFFICULT
1. FEELING NERVOUS, ANXIOUS, OR ON EDGE: MORE THAN HALF THE DAYS
GAD7 TOTAL SCORE: 10
GAD7 TOTAL SCORE: 10
7. FEELING AFRAID AS IF SOMETHING AWFUL MIGHT HAPPEN: MORE THAN HALF THE DAYS
5. BEING SO RESTLESS THAT IT IS HARD TO SIT STILL: SEVERAL DAYS
GAD7 TOTAL SCORE: 10
2. NOT BEING ABLE TO STOP OR CONTROL WORRYING: SEVERAL DAYS
3. WORRYING TOO MUCH ABOUT DIFFERENT THINGS: SEVERAL DAYS
IF YOU CHECKED OFF ANY PROBLEMS ON THIS QUESTIONNAIRE, HOW DIFFICULT HAVE THESE PROBLEMS MADE IT FOR YOU TO DO YOUR WORK, TAKE CARE OF THINGS AT HOME, OR GET ALONG WITH OTHER PEOPLE: VERY DIFFICULT
7. FEELING AFRAID AS IF SOMETHING AWFUL MIGHT HAPPEN: MORE THAN HALF THE DAYS

## 2023-03-06 ASSESSMENT — PATIENT HEALTH QUESTIONNAIRE - PHQ9
10. IF YOU CHECKED OFF ANY PROBLEMS, HOW DIFFICULT HAVE THESE PROBLEMS MADE IT FOR YOU TO DO YOUR WORK, TAKE CARE OF THINGS AT HOME, OR GET ALONG WITH OTHER PEOPLE: VERY DIFFICULT
SUM OF ALL RESPONSES TO PHQ QUESTIONS 1-9: 12
SUM OF ALL RESPONSES TO PHQ QUESTIONS 1-9: 12

## 2023-03-06 NOTE — PROGRESS NOTES
"  Assessment & Plan     Type 2 diabetes mellitus without complication, without long-term current use of insulin (H)  Visible control recently.  I intended to get an A1c however did not have the order placed at the time of blood draw.  Okay for recheck in 2 to 3 months.  - Albumin Random Urine Quantitative with Creat Ratio  - Basic metabolic panel  (Ca, Cl, CO2, Creat, Gluc, K, Na, BUN)    Osteoarthritis Of The Knee  Chronic, continuous use of opioids  Due to insurance restrictions, receiving only 21 tablets of medicine per prescription refill.  We will work on PA.  Previously, was taking medications responsibly and this allows her to achieve activities of daily living with reasonable pain management.  Patient signed controlled substance agreement again today.  - HYDROcodone-acetaminophen (NORCO) 7.5-325 MG per tablet  Dispense: 90 tablet; Refill: 0  - Compliance Drug Analysis Urine    Alcohol abuse, in remission  Continues to avoid alcohol.    Eczema, unspecified type  Refill prescription  - triamcinolone (KENALOG) 0.1 % external cream  Dispense: 80 g; Refill: 0    Screen for colon cancer  Colon cancer screening  Has a Cologuard kit at home.  She intends to do this eventually but has not done it yet.    Visit for screening mammogram  - MA SCREENING DIGITAL BILAT - Future  (s+30)    Still has concerns with depression and anxiety.  Has not changed much recently.  No significant suicidal thoughts.  I do not believe she is in danger of harming herself at this time..  She reports that [facility] and she was told to call back in August.  I will replace a new referral.    BMI:   Estimated body mass index is 38.74 kg/m  as calculated from the following:    Height as of this encounter: 1.685 m (5' 6.34\").    Weight as of this encounter: 110 kg (242 lb 8 oz).     Depression Screening Follow Up    PHQ 3/6/2023   PHQ-9 Total Score 12   Q9: Thoughts of better off " dead/self-harm past 2 weeks More than half the days   F/U: Thoughts of suicide or self-harm No   F/U: Self harm-plan -   F/U: Self-harm action -   F/U: Safety concerns No     Last PHQ-9 3/6/2023   1.  Little interest or pleasure in doing things 1   2.  Feeling down, depressed, or hopeless 1   3.  Trouble falling or staying asleep, or sleeping too much 1   4.  Feeling tired or having little energy 1   5.  Poor appetite or overeating 2   6.  Feeling bad about yourself 1   7.  Trouble concentrating 1   8.  Moving slowly or restless 2   Q9: Thoughts of better off dead/self-harm past 2 weeks 2   PHQ-9 Total Score 12   Difficulty at work, home, or with people -   In the past two weeks have you had thoughts of suicide or self harm? No   Do you have concerns about your personal safety or the safety of others? No   In the past 2 weeks have you thought about a plan or had intention to harm yourself? -   In the past 2 weeks have you acted on these thoughts in any way? -     Follow Up      Follow Up Actions Taken  Crisis resource information provided in the After Visit Summary  Mental Health Referral placed    No follow-ups on file.    jA Conrad MD  [facility]    Aditya Fernandez is a 57 year old accompanied by her son, presenting for the following health issues:  Hypertension, Recheck Medication, and Diabetes      History of Present Illness       Mental Health Follow-up:  Patient presents to follow-up on Depression & Anxiety.Patient's depression since last visit has been:  Medium  The patient is not having other symptoms associated with depression.  Patient's anxiety since last visit has been:  Medium  The patient is not having other symptoms associated with anxiety.  Any significant life events: other  Patient is feeling anxious or having panic attacks.  Patient has no concerns about alcohol or drug use.    Diabetes:   She presents for follow up of diabetes.  She is checking home blood glucose  "a few times a month. She checks blood glucose at bedtime.  Blood glucose is never over 200 and never under 70. She is aware of hypoglycemia symptoms including other. She is concerned about frequent infections. She is not experiencing numbness or burning in feet, excessive thirst, blurry vision, weight changes or redness, sores or blisters on feet.         Hypertension: She presents for follow up of hypertension.  She does not check blood pressure  regularly outside of the clinic. Outpatient blood pressures have not been over 140/90. She does not follow a low salt diet.     She eats 2-3 servings of fruits and vegetables daily.She consumes 2 sweetened beverage(s) daily.She exercises with enough effort to increase her heart rate 10 to 19 minutes per day.  She exercises with enough effort to increase her heart rate 4 days per week.   She is taking medications regularly.    Today's PHQ-9         PHQ-9 Total Score: 12    PHQ-9 Q9 Thoughts of better off dead/self-harm past 2 weeks :   More than half the days  Thoughts of suicide or self harm: (P) No  Self-harm Plan:     Self-harm Action:       Safety concerns for self or others: (P) No    How difficult have these problems made it for you to do your work, take care of things at home, or get along with other people: Very difficult  Today's FOX-7 Score: 10         Objective    /80 (BP Location: Left arm, Patient Position: Sitting, Cuff Size: Adult Large)   Pulse 100   Temp 97.2  F (36.2  C) (Temporal)   Ht 1.685 m (5' 6.34\")   Wt 110 kg (242 lb 8 oz)   SpO2 97%   BMI 38.74 kg/m    Body mass index is 38.74 kg/m .  Physical Exam   GENERAL: alert, not distressed  CHEST: clear, no rales, rhonchi, or wheezes  CARDIAC: regular without murmur, gallop, or rub  ABDOMEN: soft, non tender, non distended, normal bowel sounds                "

## 2023-03-06 NOTE — LETTER
Opioid / Opioid Plus Controlled Substance Agreement    This is an agreement between you and your provider about the safe and appropriate use of controlled substance/opioids prescribed by your care team. Controlled substances are medicines that can cause physical and mental dependence (abuse).    There are strict laws about having and using these medicines. We here at [facility] are committing to working with you in your efforts to get better. To support you in this work, we ll help you schedule regular office appointments for medicine refills. If we must cancel or change your appointment for any reason, we ll make sure you have enough medicine to last until your next appointment.     As a Provider, I will:    Listen carefully to your concerns and treat you with respect.     Recommend a treatment plan that I believe is in your best interest. This plan may involve therapies other than opioid pain medication.     Talk with you often about the possible benefits, and the risk of harm of any medicine that we prescribe for you.     Provide a plan on how to taper (discontinue or go off) using this medicine if the decision is made to stop its use.    As a Patient, I understand that opioid(s):     Are a controlled substance prescribed by my care team to help me function or work and manage my condition(s).     Are strong medicines and can cause serious side effects such as:    Drowsiness, which can seriously affect my driving ability    A lower breathing rate, enough to cause death    Harm to my thinking ability     Depression     Abuse of and addiction to this medicine    Need to be taken exactly as prescribed. Combining opioids with certain medicines or chemicals (such as illegal drugs, sedatives, sleeping pills, and benzodiazepines) can be dangerous or even fatal. If I stop opioids suddenly, I may have severe withdrawal symptoms.    Do not work for all types of pain nor for all patients. If they re not helpful, I may  be asked to stop them.        The risks, benefits and side effects of these medicine(s) were explained to me. I agree that:  1. I will take part in other treatments as advised by my care team. This may be psychiatry or counseling, physical therapy, behavioral therapy, group treatment or a referral to a specialist.     2. I will keep all my appointments. I understand that this is part of the monitoring of opioids. My care team may require an office visit for EVERY opioid/controlled substance refill. If I miss appointments or don t follow instructions, my care team may stop my medicine.    3. I will take my medicines as prescribed. I will not change the dose or schedule unless my care team tells me to. There will be no refills if I run out early.     4. I may be asked to come to the clinic and complete a urine drug test or complete a pill count at any time. If I don t give a urine sample or participate in a pill count, the care team may stop my medicine.    5. I will only receive prescriptions from this clinic for chronic pain. If I am treated by another provider for acute pain issues, I will tell them that I am taking opioid pain medication for chronic pain and that I have a treatment agreement with this provider. I will inform my [facility] care team within one business day if I am given a prescription for any pain medication by another healthcare provider. My [facility] care team can contact other providers and pharmacists about my use of any medicines.    6. It is up to me to make sure that I don t run out of my medicines on weekends or holidays. If my care team is willing to refill my opioid prescription without a visit, I must request refills only during office hours. Refills may take up to 3 business days to process. I will use one pharmacy to fill all my opioid and other controlled substance prescriptions. I will notify the clinic about any changes to my insurance or medication  availability.    7. I am responsible for my prescriptions. If the medicine/prescription is lost, stolen or destroyed, it will not be replaced. I also agree not to share controlled substance medicines with anyone.    8. I am aware I should not use any illegal or recreational drugs. I agree not to drink alcohol unless my care team says I can.       9. If I enroll in the [place] Medical Cannabis program, I will tell my care team prior to my next refill.     10. I will tell my care team right away if I become pregnant, have a new medical problem treated outside of my regular clinic, or have a change in my medications.    11. I understand that this medicine can affect my thinking, judgment and reaction time. Alcohol and drugs affect the brain and body, which can affect the safety of my driving. Being under the influence of alcohol or drugs can affect my decision-making, behaviors, personal safety, and the safety of others. Driving while impaired (DWI) can occur if a person is driving, operating, or in physical control of a car, motorcycle, boat, snowmobile, ATV, motorbike, off-road vehicle, or any other motor vehicle ([place] Statute 169A.20). I understand the risk if I choose to drive or operate any vehicle or machinery.    I understand that if I do not follow any of the conditions above, my prescriptions or treatment may be stopped or changed.          Opioids  What You Need to Know    What are opioids?   Opioids are pain medicines that must be prescribed by a doctor. They are also known as narcotics.     Examples are:   1. morphine (MS Contin, Yue)  2. oxycodone (Oxycontin)  3. oxycodone and acetaminophen (Percocet)  4. hydrocodone and acetaminophen (Vicodin, Norco)   5. fentanyl patch (Duragesic)   6. hydromorphone (Dilaudid)   7. methadone  8. codeine (Tylenol #3)     What do opioids do well?   Opioids are best for severe short-term pain such as after a surgery or injury. They may work well for cancer pain. They may  help some people with long-lasting (chronic) pain.     What do opioids NOT do well?   Opioids never get rid of pain entirely, and they don t work well for most patients with chronic pain. Opioids don t reduce swelling, one of the causes of pain.                                    Other ways to manage chronic pain and improve function include:       Treat the health problem that may be causing pain    Anti-inflammation medicines, which reduce swelling and tenderness, such as ibuprofen (Advil, Motrin) or naproxen (Aleve)    Acetaminophen (Tylenol)    Antidepressants and anti-seizure medicines, especially for nerve pain    Topical treatments such as patches or creams    Injections or nerve blocks    Chiropractic or osteopathic treatment    Acupuncture, massage, deep breathing, meditation, visual imagery, aromatherapy    Use heat or ice at the pain site    Physical therapy     Exercise    Stop smoking    Take part in therapy       Risks and side effects     Talk to your doctor before you start or decide to keep taking opioids. Possible side effects include:      Lowering your breathing rate enough to cause death    Overdose, including death, especially if taking higher than prescribed doses    Worse depression symptoms; less pleasure in things you usually enjoy    Feeling tired or sluggish    Slower thoughts or cloudy thinking    Being more sensitive to pain over time; pain is harder to control    Trouble sleeping or restless sleep    Changes in hormone levels (for example, less testosterone)    Changes in sex drive or ability to have sex    Constipation    Unsafe driving    Itching and sweating    Dizziness    Nausea, throwing up and dry mouth    What else should I know about opioids?    Opioids may lead to dependence, tolerance, or addiction.      Dependence means that if you stop or reduce the medicine too quickly, you will have withdrawal symptoms. These include loose poop (diarrhea), jitters, flu-like symptoms,  nervousness and tremors. Dependence is not the same as addiction.                       Tolerance means needing higher doses over time to get the same effect. This may increase the chance of serious side effects.      Addiction is when people improperly use a substance that harms their body, their mind or their relations with others. Use of opiates can cause a relapse of addiction if you have a history of drug or alcohol abuse.      People who have used opioids for a long time may have a lower quality of life, worse depression, higher levels of pain and more visits to doctors.    You can overdose on opioids. Take these steps to lower your risk of overdose:    1. Recognize the signs:  Signs of overdose include decrease or loss of consciousness (blackout), slowed breathing, trouble waking up and blue lips. If someone is worried about overdose, they should call 911.    2. Talk to your doctor about Narcan (naloxone).   If you are at risk for overdose, you may be given a prescription for Narcan. This medicine very quickly reverses the effects of opioids.   If you overdose, a friend or family member can give you Narcan while waiting for the ambulance. They need to know the signs of overdose and how to give Narcan.     3. Don't use alcohol or street drugs.   Taking them with opioids can cause death.    4. Do not take any of these medicines unless your doctor says it s OK. Taking these with opioids can cause death:    Benzodiazepines, such as lorazepam (Ativan), alprazolam (Xanax) or diazepam (Valium)    Muscle relaxers, such as cyclobenzaprine (Flexeril)    Sleeping pills like zolpidem (Ambien)     Other opioids      How to keep you and other people safe while taking opioids:    1. Never share your opioids with others.  Opioid medicines are regulated by the Drug Enforcement Agency ([place]). Selling or sharing medications is a criminal act.    2. Be sure to store opioids in a secure place, locked up if possible. Young children  can easily swallow them and overdose.    3. When you are traveling with your medicines, keep them in the original bottles. If you use a pill box, be sure you also carry a copy of your medicine list from your clinic or pharmacy.    4. Safe disposal of opioids    Most pharmacies have places to get rid of medicine, called disposal kiosks. Medicine disposal options are also available in every [facility]. Search your county and  medication disposal  to find more options. You can find more details at:  [email or web address].[email or web address]./living-green/managing-unwanted-medications     I agree that my provider, clinic care team, and pharmacy may work with any city, state or federal law enforcement agency that investigates the misuse, sale, or other diversion of my controlled medicine. I will allow my provider to discuss my care with, or share a copy of, this agreement with any other treating provider, pharmacy or emergency room where I receive care.    I have read this agreement and have asked questions about anything I did not understand.    _______________________________________________________  Patient Signature - Rebecca Smalls _____________________                   Date     _______________________________________________________  Provider Signature - Aj Conrad MD   _____________________                   Date     _______________________________________________________  Witness Signature (required if provider not present while patient signing)   _____________________                   Date

## 2023-03-06 NOTE — LETTER
March 6, 2023      Rebecca Smalls  [address]  [place] [address]        To Whom It May Concern:    Please excuse Chad Smalls as he accomanied his mother Rebecca Smalls to clinic on 3/6/2023.  He can return to work.        Sincerely,        Aj Conrad MD

## 2023-03-07 LAB
CREAT UR-MCNC: 148 MG/DL
MICROALBUMIN UR-MCNC: 62.2 MG/L
MICROALBUMIN/CREAT UR: 42.03 MG/G CR (ref 0–25)

## 2023-03-12 LAB — DRUGS UR: NORMAL

## 2023-04-08 DIAGNOSIS — F11.90 CHRONIC, CONTINUOUS USE OF OPIOIDS: ICD-10-CM

## 2023-04-08 DIAGNOSIS — E11.9 TYPE 2 DIABETES MELLITUS WITHOUT COMPLICATION, WITHOUT LONG-TERM CURRENT USE OF INSULIN (H): ICD-10-CM

## 2023-04-08 DIAGNOSIS — F32.1 MODERATE SINGLE CURRENT EPISODE OF MAJOR DEPRESSIVE DISORDER (H): ICD-10-CM

## 2023-04-08 DIAGNOSIS — M17.10 UNILATERAL PRIMARY OSTEOARTHRITIS, UNSPECIFIED KNEE: ICD-10-CM

## 2023-04-08 DIAGNOSIS — F41.1 GAD (GENERALIZED ANXIETY DISORDER): ICD-10-CM

## 2023-04-09 RX ORDER — MIRTAZAPINE 15 MG/1
TABLET, FILM COATED ORAL
Qty: 90 TABLET | Refills: 0 | OUTPATIENT
Start: 2023-04-09

## 2023-04-09 RX ORDER — DULOXETIN HYDROCHLORIDE 60 MG/1
CAPSULE, DELAYED RELEASE ORAL
Qty: 90 CAPSULE | Refills: 0 | Status: SHIPPED | OUTPATIENT
Start: 2023-04-09 | End: 2023-07-15

## 2023-04-09 RX ORDER — DAPAGLIFLOZIN 10 MG/1
TABLET, FILM COATED ORAL
Qty: 90 TABLET | Refills: 0 | Status: SHIPPED | OUTPATIENT
Start: 2023-04-09 | End: 2023-07-12

## 2023-04-09 RX ORDER — HYDROCODONE BITARTRATE AND ACETAMINOPHEN 7.5; 325 MG/1; MG/1
1 TABLET ORAL 3 TIMES DAILY PRN
Qty: 90 TABLET | Refills: 0 | Status: SHIPPED | OUTPATIENT
Start: 2023-04-09 | End: 2023-05-15

## 2023-04-13 DIAGNOSIS — L30.9 ECZEMA, UNSPECIFIED TYPE: ICD-10-CM

## 2023-04-14 RX ORDER — TRIAMCINOLONE ACETONIDE 1 MG/G
CREAM TOPICAL
Qty: 80 G | Refills: 1 | Status: SHIPPED | OUTPATIENT
Start: 2023-04-14 | End: 2024-08-30

## 2023-04-14 NOTE — TELEPHONE ENCOUNTER
"    Last Written Prescription Date:  3/6/2023  Last Fill Quantity: 80g,  # refills: 0   Last office visit provider:  3/6/2023     Requested Prescriptions   Pending Prescriptions Disp Refills     triamcinolone (KENALOG) 0.1 % external cream [Pharmacy Med Name: Triamcinolone Acetonide External Cream 0.1 %] 80 g 0     Sig: Apply A THIN LAYER topically TO AFFECTED AREAS OF ARMS AND LEGS TWICE DAILY       Topical Steroids and Nonsteroidals Protocol Passed - 4/13/2023  5:04 PM        Passed - Patient is age 6 or older        Passed - Authorizing prescriber's most recent note related to this medication read.     If refill request is for ophthalmic use, please forward request to provider for approval.          Passed - High potency steroid not ordered        Passed - Recent (12 mo) or future (30 days) visit within the authorizing provider's specialty     Patient has had an office visit with the authorizing provider or a provider within the authorizing providers department within the previous 12 mos or has a future within next 30 days. See \"Patient Info\" tab in inbasket, or \"Choose Columns\" in Meds & Orders section of the refill encounter.              Passed - Medication is active on med list             Tran Skinner RN 04/14/23 3:21 PM  "

## 2023-05-12 DIAGNOSIS — M17.10 UNILATERAL PRIMARY OSTEOARTHRITIS, UNSPECIFIED KNEE: ICD-10-CM

## 2023-05-12 DIAGNOSIS — F11.90 CHRONIC, CONTINUOUS USE OF OPIOIDS: ICD-10-CM

## 2023-05-12 DIAGNOSIS — E11.29 TYPE 2 DIABETES MELLITUS WITH MICROALBUMINURIA, WITHOUT LONG-TERM CURRENT USE OF INSULIN (H): ICD-10-CM

## 2023-05-12 DIAGNOSIS — R80.9 TYPE 2 DIABETES MELLITUS WITH MICROALBUMINURIA, WITHOUT LONG-TERM CURRENT USE OF INSULIN (H): ICD-10-CM

## 2023-05-15 ENCOUNTER — VIRTUAL VISIT (OUTPATIENT)
Dept: PSYCHOLOGY | Facility: CLINIC | Age: 58
End: 2023-05-15
Attending: FAMILY MEDICINE
Payer: MEDICAID

## 2023-05-15 DIAGNOSIS — F42.9 OBSESSIVE-COMPULSIVE DISORDER, UNSPECIFIED TYPE: ICD-10-CM

## 2023-05-15 DIAGNOSIS — F43.10 POST TRAUMATIC STRESS DISORDER (PTSD): ICD-10-CM

## 2023-05-15 DIAGNOSIS — F33.2 SEVERE EPISODE OF RECURRENT MAJOR DEPRESSIVE DISORDER, WITHOUT PSYCHOTIC FEATURES (H): Primary | ICD-10-CM

## 2023-05-15 PROCEDURE — 90791 PSYCH DIAGNOSTIC EVALUATION: CPT | Mod: VID | Performed by: MARRIAGE & FAMILY THERAPIST

## 2023-05-15 RX ORDER — GLIMEPIRIDE 2 MG/1
TABLET ORAL
Qty: 90 TABLET | Refills: 0 | Status: SHIPPED | OUTPATIENT
Start: 2023-05-15 | End: 2023-09-22

## 2023-05-15 RX ORDER — HYDROCODONE BITARTRATE AND ACETAMINOPHEN 7.5; 325 MG/1; MG/1
1 TABLET ORAL 3 TIMES DAILY PRN
Qty: 90 TABLET | Refills: 0 | Status: SHIPPED | OUTPATIENT
Start: 2023-05-15 | End: 2023-06-19

## 2023-05-15 ASSESSMENT — COLUMBIA-SUICIDE SEVERITY RATING SCALE - C-SSRS
2. HAVE YOU ACTUALLY HAD ANY THOUGHTS OF KILLING YOURSELF?: NO
5. HAVE YOU STARTED TO WORK OUT OR WORKED OUT THE DETAILS OF HOW TO KILL YOURSELF? DO YOU INTEND TO CARRY OUT THIS PLAN?: NO
ATTEMPT PAST THREE MONTHS: NO
TOTAL  NUMBER OF ACTUAL ATTEMPTS LIFETIME: 2
LETHALITY/MEDICAL DAMAGE CODE MOST LETHAL ACTUAL ATTEMPT: MODERATE PHYSICAL DAMAGE, MEDICAL ATTENTION NEEDED
LETHALITY/MEDICAL DAMAGE CODE MOST RECENT POTENTIAL ATTEMPT: BEHAVIOR LIKELY TO RESULT IN INJURY BUT NOT LIKELY TO CAUSE DEATH
ATTEMPT LIFETIME: YES
TOTAL  NUMBER OF INTERRUPTED ATTEMPTS LIFETIME: NO
3. HAVE YOU BEEN THINKING ABOUT HOW YOU MIGHT KILL YOURSELF?: NO
6. HAVE YOU EVER DONE ANYTHING, STARTED TO DO ANYTHING, OR PREPARED TO DO ANYTHING TO END YOUR LIFE?: NO
2. HAVE YOU ACTUALLY HAD ANY THOUGHTS OF KILLING YOURSELF?: YES
4. HAVE YOU HAD THESE THOUGHTS AND HAD SOME INTENTION OF ACTING ON THEM?: NO
1. IN THE PAST MONTH, HAVE YOU WISHED YOU WERE DEAD OR WISHED YOU COULD GO TO SLEEP AND NOT WAKE UP?: NO
1. HAVE YOU WISHED YOU WERE DEAD OR WISHED YOU COULD GO TO SLEEP AND NOT WAKE UP?: YES
TOTAL  NUMBER OF ABORTED OR SELF INTERRUPTED ATTEMPTS LIFETIME: NO
1. IN YOUR LIFETIME, HAVE YOU WISHED YOU WERE DEAD OR WISHED YOU COULD GO TO SLEEP AND NOT WAKE UP?: PASSIVE THOUGHTS
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
## 2023-05-16 ENCOUNTER — DOCUMENTATION ONLY (OUTPATIENT)
Dept: LAB | Facility: CLINIC | Age: 58
End: 2023-05-16
Payer: MEDICAID

## 2023-05-16 DIAGNOSIS — E11.9 TYPE 2 DIABETES MELLITUS WITHOUT COMPLICATION, WITHOUT LONG-TERM CURRENT USE OF INSULIN (H): Primary | ICD-10-CM

## 2023-05-16 NOTE — PROGRESS NOTES
Rebecca Smalls has an upcoming lab appointment:    Future Appointments   Date Time Provider Department Center   5/18/2023 10:15 AM [facility] LAB [facility]     Patient is scheduled for the following lab(s): a1c    There is no order available. Please review and place either future orders or HMPO (Review of Health Maintenance Protocol Orders), as appropriate.    Health Maintenance Due   Topic     ANNUAL REVIEW OF HM ORDERS      A1C      Ramakrishna Cao

## 2023-06-19 DIAGNOSIS — F11.90 CHRONIC, CONTINUOUS USE OF OPIOIDS: ICD-10-CM

## 2023-06-19 DIAGNOSIS — F41.1 GAD (GENERALIZED ANXIETY DISORDER): ICD-10-CM

## 2023-06-19 DIAGNOSIS — M17.10 UNILATERAL PRIMARY OSTEOARTHRITIS, UNSPECIFIED KNEE: ICD-10-CM

## 2023-06-19 DIAGNOSIS — J30.9 ALLERGIC RHINITIS, UNSPECIFIED SEASONALITY, UNSPECIFIED TRIGGER: ICD-10-CM

## 2023-06-19 RX ORDER — HYDROXYZINE HYDROCHLORIDE 25 MG/1
TABLET, FILM COATED ORAL
Qty: 90 TABLET | Refills: 0 | Status: SHIPPED | OUTPATIENT
Start: 2023-06-19 | End: 2023-10-18

## 2023-06-19 RX ORDER — HYDROCODONE BITARTRATE AND ACETAMINOPHEN 7.5; 325 MG/1; MG/1
TABLET ORAL
Qty: 90 TABLET | Refills: 0 | Status: SHIPPED | OUTPATIENT
Start: 2023-06-19 | End: 2023-07-21

## 2023-07-10 DIAGNOSIS — E11.9 TYPE 2 DIABETES MELLITUS WITHOUT COMPLICATION, WITHOUT LONG-TERM CURRENT USE OF INSULIN (H): ICD-10-CM

## 2023-07-10 RX ORDER — ATORVASTATIN CALCIUM 40 MG/1
TABLET, FILM COATED ORAL
Qty: 90 TABLET | Refills: 1 | Status: SHIPPED | OUTPATIENT
Start: 2023-07-10

## 2023-07-11 DIAGNOSIS — E11.9 TYPE 2 DIABETES MELLITUS WITHOUT COMPLICATION, WITHOUT LONG-TERM CURRENT USE OF INSULIN (H): ICD-10-CM

## 2023-07-11 NOTE — TELEPHONE ENCOUNTER
"Last Written Prescription Date:  7/1/2022  Last Fill Quantity: 90,  # refills: 4   Last office visit provider:  3/6/2023     Requested Prescriptions   Pending Prescriptions Disp Refills     atorvastatin (LIPITOR) 40 MG tablet [Pharmacy Med Name: Atorvastatin Calcium Oral Tablet 40 MG] 90 tablet 0     Sig: TAKE ONE TABLET BY MOUTH ONE TIME DAILY       Statins Protocol Passed - 7/10/2023  4:57 PM        Passed - LDL on file in past 12 months     Recent Labs   Lab Test 10/26/22  1614   LDL 73             Passed - No abnormal creatine kinase in past 12 months     No lab results found.             Passed - Recent (12 mo) or future (30 days) visit within the authorizing provider's specialty     Patient has had an office visit with the authorizing provider or a provider within the authorizing providers department within the previous 12 mos or has a future within next 30 days. See \"Patient Info\" tab in inbasket, or \"Choose Columns\" in Meds & Orders section of the refill encounter.              Passed - Medication is active on med list        Passed - Patient is age 18 or older        Passed - No active pregnancy on record        Passed - No positive pregnancy test in past 12 months             Fiordaliza Swan RN 07/10/23 9:48 PM  "

## 2023-07-12 RX ORDER — DAPAGLIFLOZIN 10 MG/1
TABLET, FILM COATED ORAL
Qty: 90 TABLET | Refills: 0 | Status: SHIPPED | OUTPATIENT
Start: 2023-07-12 | End: 2023-10-18

## 2023-07-15 DIAGNOSIS — F41.1 GAD (GENERALIZED ANXIETY DISORDER): ICD-10-CM

## 2023-07-15 DIAGNOSIS — F32.1 MODERATE SINGLE CURRENT EPISODE OF MAJOR DEPRESSIVE DISORDER (H): ICD-10-CM

## 2023-07-15 RX ORDER — DULOXETIN HYDROCHLORIDE 60 MG/1
CAPSULE, DELAYED RELEASE ORAL
Qty: 90 CAPSULE | Refills: 0 | Status: SHIPPED | OUTPATIENT
Start: 2023-07-15 | End: 2023-10-18

## 2023-07-15 NOTE — LETTER
July 18, 2023      Rebecca Smalls  [address]  [place] [address]        Dear Rebecca,    As a valued [facility] patient, your healthcare needs are our priority.    Your health care team has determined that you are due for an appointment for a medication check.   We encourage you to call or schedule an appointment with your primary care provider to discuss your overdue screening and schedule an appointment.     If you already have had your screening performed at another health care facility, please ask that practice to send your results to [facility] [phone] and we will update your health records. This will ensure you receive the best possible care from our providers.      If you have any questions or need help with scheduling, please call the [facility] at [phone].        Yours in health,       Your care team at [facility]

## 2023-07-15 NOTE — TELEPHONE ENCOUNTER
"Routing refill request to provider for review/approval because:  Serotonin-Norepinephrine Reuptake Inhibitors  Failed - 7/15/2023  4:16     Last Written Prescription Date:  4/9/23  Last Fill Quantity: 90,  # refills: 0   Last office visit provider:  5/15/23 HALIE Bautista  and  3/6/23 Dr. Conrad (PCP)     Requested Prescriptions   Pending Prescriptions Disp Refills    DULoxetine (CYMBALTA) 60 MG capsule [Pharmacy Med Name: DULoxetine HCl Oral Capsule Delayed Release Particles 60 MG] 90 capsule 0     Sig: Take 1 capsule by mouth daily.       Serotonin-Norepinephrine Reuptake Inhibitors  Failed - 7/15/2023  4:16 PM        Failed - PHQ-9 score of less than 5 in past 6 months     Please review last PHQ-9 score.           Passed - Blood pressure under 140/90 in past 12 months     BP Readings from Last 3 Encounters:   03/06/23 124/80   10/26/22 127/83   03/15/22 123/80                 Passed - Medication is active on med list        Passed - Patient is age 18 or older        Passed - No active pregnancy on record        Passed - No positive pregnancy test in past 12 months        Passed - Recent (6 mo) or future (30 days) visit within the authorizing provider's specialty     Patient had office visit in the last 6 months or has a visit in the next 30 days with authorizing provider or within the authorizing provider's specialty.  See \"Patient Info\" tab in inbasket, or \"Choose Columns\" in Meds & Orders section of the refill encounter.                 Joanne Rose RN 07/15/23 4:21 PM  "

## 2023-07-17 ENCOUNTER — NURSE TRIAGE (OUTPATIENT)
Dept: NURSING | Facility: CLINIC | Age: 58
End: 2023-07-17

## 2023-07-17 NOTE — TELEPHONE ENCOUNTER
Nurse Triage SBAR    Is this a 2nd Level Triage? YES, LICENSED PRACTITIONER REVIEW IS REQUIRED    Situation: Patient returning call from clinic staff.    Background: Patient stated that message was related to insurance and her upcoming appointments. Patient states that she no longer has [employer] coverage. Patient states her only insurance is Medicaid [place] (which is currently listed as her secondary). We should drop [employer] from her coverage options.    Assessment: Info call.    Protocol Recommended Disposition:   Discuss With PCP And Callback By Nurse Today    Recommendation: Advised patient that messaging to be sent to PCP/Care team for disposition input.     Routed to provider    Does the patient meet one of the following criteria for ADS visit consideration? 16+ years old, with an [facility] PCP     TIP  Providers, please consider if this condition is appropriate for management at one of our Acute and Diagnostic Services sites.     If patient is a good candidate, please use dotphrase <dot>triageresponse and select Refer to ADS to document.     Reason for Disposition    Nursing judgment    Additional Information    Negative: Nursing judgment    Negative: Nursing judgment    Negative: Nursing judgment    Protocols used: INFORMATION ONLY CALL - NO TRIAGE-A-OH    Jarett Connelly RN, BSN, MSN  FNA Triage 1:14 PM

## 2023-07-19 DIAGNOSIS — M17.10 UNILATERAL PRIMARY OSTEOARTHRITIS, UNSPECIFIED KNEE: ICD-10-CM

## 2023-07-19 DIAGNOSIS — F11.90 CHRONIC, CONTINUOUS USE OF OPIOIDS: ICD-10-CM

## 2023-07-19 DIAGNOSIS — J45.30 MILD PERSISTENT ASTHMA WITHOUT COMPLICATION: ICD-10-CM

## 2023-07-20 NOTE — TELEPHONE ENCOUNTER
"Both refills routed to PCP since one is a controlled substance.    (Ventolin would have passed RN refill protocols):  Last Written VENTOLIN Date:  4/29/2022  Last Fill Quantity: 18 g,  # refills: 2     Last office visit provider:  3/6/2023     Requested Prescriptions   Pending Prescriptions Disp Refills     HYDROcodone-acetaminophen (NORCO) 7.5-325 MG per tablet [Pharmacy Med Name: HYDROcodone-Acetaminophen Oral Tablet 7.5-325 MG] 90 tablet 0     Sig: Take 1 tablet by mouth 3 times daily as needed for severe pain       There is no refill protocol information for this order        albuterol (VENTOLIN HFA) 108 (90 Base) MCG/ACT inhaler [Pharmacy Med Name: Ventolin HFA Inhalation Aerosol Solution 108 (90 Base) MCG/ACT] 18 g 0     Sig: INHALE 2 PUFFS BY MOUTH EVERY 4 HOURS AS NEEDED FOR WHEEZING       Asthma Maintenance Inhalers - Anticholinergics Passed - 7/19/2023  2:42 PM        Passed - Patient is age 12 years or older        Passed - Asthma control assessment score within normal limits in last 6 months     Please review ACT score.           Passed - Medication is active on med list        Passed - Recent (6 mo) or future (30 days) visit within the authorizing provider's specialty     Patient had office visit in the last 6 months or has a visit in the next 30 days with authorizing provider or within the authorizing provider's specialty.  See \"Patient Info\" tab in inbasket, or \"Choose Columns\" in Meds & Orders section of the refill encounter.           Short-Acting Beta Agonist Inhalers Protocol  Passed - 7/19/2023  2:42 PM        Passed - Patient is age 12 or older        Passed - Asthma control assessment score within normal limits in last 6 months     Please review ACT score.           Passed - Medication is active on med list        Passed - Recent (6 mo) or future (30 days) visit within the authorizing provider's specialty     Patient had office visit in the last 6 months or has a visit in the next 30 days with " "authorizing provider or within the authorizing provider's specialty.  See \"Patient Info\" tab in inbasket, or \"Choose Columns\" in Meds & Orders section of the refill encounter.                 Roxanne Krueger RN 07/20/23 10:16 AM  "

## 2023-07-21 RX ORDER — ALBUTEROL SULFATE 90 UG/1
AEROSOL, METERED RESPIRATORY (INHALATION)
Qty: 18 G | Refills: 0 | Status: SHIPPED | OUTPATIENT
Start: 2023-07-21 | End: 2023-09-22

## 2023-07-21 RX ORDER — HYDROCODONE BITARTRATE AND ACETAMINOPHEN 7.5; 325 MG/1; MG/1
TABLET ORAL
Qty: 90 TABLET | Refills: 0 | Status: SHIPPED | OUTPATIENT
Start: 2023-07-21 | End: 2023-08-23

## 2023-07-28 ENCOUNTER — TRANSFERRED RECORDS (OUTPATIENT)
Dept: HEALTH INFORMATION MANAGEMENT | Facility: CLINIC | Age: 58
End: 2023-07-28
Payer: MEDICARE

## 2023-07-28 LAB — RETINOPATHY: NEGATIVE

## 2023-08-22 DIAGNOSIS — M17.10 UNILATERAL PRIMARY OSTEOARTHRITIS, UNSPECIFIED KNEE: ICD-10-CM

## 2023-08-22 DIAGNOSIS — F11.90 CHRONIC, CONTINUOUS USE OF OPIOIDS: ICD-10-CM

## 2023-08-23 RX ORDER — HYDROCODONE BITARTRATE AND ACETAMINOPHEN 7.5; 325 MG/1; MG/1
TABLET ORAL
Qty: 90 TABLET | Refills: 0 | Status: SHIPPED | OUTPATIENT
Start: 2023-08-23 | End: 2023-09-22

## 2023-09-22 DIAGNOSIS — M17.10 UNILATERAL PRIMARY OSTEOARTHRITIS, UNSPECIFIED KNEE: ICD-10-CM

## 2023-09-22 DIAGNOSIS — E11.29 TYPE 2 DIABETES MELLITUS WITH MICROALBUMINURIA, WITHOUT LONG-TERM CURRENT USE OF INSULIN (H): ICD-10-CM

## 2023-09-22 DIAGNOSIS — R80.9 TYPE 2 DIABETES MELLITUS WITH MICROALBUMINURIA, WITHOUT LONG-TERM CURRENT USE OF INSULIN (H): ICD-10-CM

## 2023-09-22 DIAGNOSIS — J45.30 MILD PERSISTENT ASTHMA WITHOUT COMPLICATION: ICD-10-CM

## 2023-09-22 DIAGNOSIS — F11.90 CHRONIC, CONTINUOUS USE OF OPIOIDS: ICD-10-CM

## 2023-09-22 RX ORDER — GLIMEPIRIDE 2 MG/1
TABLET ORAL
Qty: 90 TABLET | Refills: 0 | Status: SHIPPED | OUTPATIENT
Start: 2023-09-22

## 2023-09-22 RX ORDER — HYDROCODONE BITARTRATE AND ACETAMINOPHEN 7.5; 325 MG/1; MG/1
TABLET ORAL
Qty: 90 TABLET | Refills: 0 | Status: SHIPPED | OUTPATIENT
Start: 2023-09-22 | End: 2023-10-18

## 2023-09-22 RX ORDER — ALBUTEROL SULFATE 90 UG/1
AEROSOL, METERED RESPIRATORY (INHALATION)
Qty: 18 G | Refills: 0 | Status: SHIPPED | OUTPATIENT
Start: 2023-09-22

## 2023-09-23 DIAGNOSIS — J45.30 MILD PERSISTENT ASTHMA WITHOUT COMPLICATION: ICD-10-CM

## 2023-09-25 RX ORDER — BUDESONIDE AND FORMOTEROL FUMARATE DIHYDRATE 160; 4.5 UG/1; UG/1
AEROSOL RESPIRATORY (INHALATION)
Qty: 10.2 G | Refills: 0 | Status: SHIPPED | OUTPATIENT
Start: 2023-09-25 | End: 2023-10-24

## 2023-10-16 DIAGNOSIS — J30.9 ALLERGIC RHINITIS, UNSPECIFIED SEASONALITY, UNSPECIFIED TRIGGER: ICD-10-CM

## 2023-10-16 DIAGNOSIS — M17.10 UNILATERAL PRIMARY OSTEOARTHRITIS, UNSPECIFIED KNEE: ICD-10-CM

## 2023-10-16 DIAGNOSIS — E11.9 TYPE 2 DIABETES MELLITUS WITHOUT COMPLICATION, WITHOUT LONG-TERM CURRENT USE OF INSULIN (H): ICD-10-CM

## 2023-10-16 DIAGNOSIS — F11.90 CHRONIC, CONTINUOUS USE OF OPIOIDS: ICD-10-CM

## 2023-10-16 DIAGNOSIS — F32.1 MODERATE SINGLE CURRENT EPISODE OF MAJOR DEPRESSIVE DISORDER (H): ICD-10-CM

## 2023-10-16 DIAGNOSIS — F41.1 GAD (GENERALIZED ANXIETY DISORDER): ICD-10-CM

## 2023-10-18 RX ORDER — DULOXETIN HYDROCHLORIDE 60 MG/1
CAPSULE, DELAYED RELEASE ORAL
Qty: 90 CAPSULE | Refills: 0 | Status: SHIPPED | OUTPATIENT
Start: 2023-10-18

## 2023-10-18 RX ORDER — DAPAGLIFLOZIN 10 MG/1
TABLET, FILM COATED ORAL
Qty: 90 TABLET | Refills: 0 | Status: SHIPPED | OUTPATIENT
Start: 2023-10-18

## 2023-10-18 RX ORDER — HYDROCODONE BITARTRATE AND ACETAMINOPHEN 7.5; 325 MG/1; MG/1
1 TABLET ORAL 3 TIMES DAILY PRN
Qty: 90 TABLET | Refills: 0 | Status: SHIPPED | OUTPATIENT
Start: 2023-10-20 | End: 2023-11-27

## 2023-10-18 RX ORDER — HYDROXYZINE HYDROCHLORIDE 25 MG/1
TABLET, FILM COATED ORAL
Qty: 90 TABLET | Refills: 0 | Status: SHIPPED | OUTPATIENT
Start: 2023-10-18

## 2023-10-18 NOTE — TELEPHONE ENCOUNTER
Due for appt.  Future Appointments   Date Time Provider Department Center   11/22/2023  1:00 PM Aj Conrad MD [facility]

## 2023-10-23 ENCOUNTER — NURSE TRIAGE (OUTPATIENT)
Dept: NURSING | Facility: CLINIC | Age: 58
End: 2023-10-23
Payer: MEDICARE

## 2023-10-23 NOTE — TELEPHONE ENCOUNTER
HPI: Hailey García is a 48 year old female referred to see me by Tammy Kebede for evaluation of gastroesophageal reflux disease.  She notes  a several year history of gastroesophageal reflux disease described as esophageal burning, spontaneous regurgitation when laying flat at night, coughing at night, and waterbrash.  She has had an endoscopy in 2017 with a subsequent endoscopy recently that did not demonstrate any significant esophageal changes.  Her GERD HR Q OL is 7.  However, she states that the numbers would be larger if she stopped taking her PPIs.    Allergies:Patient has no known allergies.    No past medical history on file.    Past Surgical History:   Procedure Laterality Date    HYSTEROSCOPY, ABLATE ENDOMETRIUM HYDROTHERMAL, COMBINED N/A 5/8/2019    Procedure: HYSTEROSCOPY, DILATION AND CURETTAGE NOVASURE ABLATION;  Surgeon: Tammy Kebede MD;  Location: [facility];  Service: Gynecology    REFRACTIVE SURGERY         CURRENT MEDS:    Current Outpatient Medications:     azelastine (ASTEPRO) 0.15 % (205.5 mcg) Spry nasal spray, [AZELASTINE (ASTEPRO) 0.15 % (205.5 MCG) SPRY NASAL SPRAY] Apply 205.5 mcg into each nostril 2 (two) times a day., Disp: , Rfl:     fluticasone propionate (FLONASE ALLERGY RELIEF) 50 mcg/actuation nasal spray, [FLUTICASONE PROPIONATE (FLONASE ALLERGY RELIEF) 50 MCG/ACTUATION NASAL SPRAY] Apply 2 sprays into each nostril every evening.       , Disp: , Rfl:     naproxen (NAPROSYN) 500 MG tablet, Take 1 tablet (500 mg) by mouth 2 times daily as needed for moderate pain, Disp: 20 tablet, Rfl: 1    omeprazole (PRILOSEC) 20 MG DR capsule, Take 20 mg by mouth 2 times daily, Disp: , Rfl:     calcium carbonate (TUMS ORAL), [CALCIUM CARBONATE (TUMS ORAL)] Take by mouth daily as needed. (Patient not taking: Reported on 9/8/2023), Disp: , Rfl:     ketotifen (ZADITOR/ZYRTEC ITCHY EYES) 0.025 % (0.035 %) ophthalmic solution, [KETOTIFEN (ZADITOR/ZYRTEC ITCHY EYES) 0.025 %  Nurse Triage SBAR    Is this a 2nd Level Triage? YES, LICENSED PRACTITIONER REVIEW IS REQUIRED    Situation:   Spoke with 58 yr old Rebecca who c/o:  Worsening asthma symptoms over the past 2 days.  Coughing  Short of breath even at rest and with walking from room to room  Symptoms began about 2.5 months.  Worsened in August.  Denies wheezing.  Using albuterol every 4 hours as ordered. States she has also used her albuterol 20-30 minutes apart.  Relief lasts for 30 to 45 minutes.    Consent: not needed    Background:   Hx of asthma  Hx of diabetes.    Assessment: evaluation needed      Protocol Recommended Disposition:   Go to ED/UCC now or to office with PCP approval.    Recommendation:   Will consult with PCP for level of care per second level triage: ED/UCC now or to office with PCP approval.  Please advise.  Advised patient to use quick relief/albuterol now since it has been 3 hours since last albuterol treatment.  Advised to drink plenty of liquids to stay hydrated.  Advised to call 911 if having severe difficulty breathing    Routed to provider    Does the patient meet one of the following criteria for ADS visit consideration? 16+ years old, with an [facility] PCP     TIP  Providers, please consider if this condition is appropriate for management at one of our Acute and Diagnostic Services sites.     If patient is a good candidate, please use dotphrase <dot>triageresponse and select Refer to ADS to document.    Che Singh RN  [facility]    Che Singh RN 8:53 AM 10/23/2023  Reason for Disposition   MODERATE asthma attack (e.g., SOB at rest, speaks in phrases, audible wheezes) and not resolved after 2 or 3 inhaler or nebulizer treatments given 20 minutes apart    Additional Information   Negative: SEVERE asthma attack (e.g., struggling for each breath, speaks in single words, loud wheezes, pulse >120) (RED Zone)   Negative: Bluish (or gray) lips or face   Negative: Difficult to awaken or acting  (0.035 %) OPHTHALMIC SOLUTION] Administer 1 drop to both eyes 2 (two) times a day as needed. (Patient not taking: Reported on 9/8/2023), Disp: , Rfl:     oxyCODONE (ROXICODONE) 5 MG immediate release tablet, [OXYCODONE (ROXICODONE) 5 MG IMMEDIATE RELEASE TABLET] Take 1 tablet (5 mg total) by mouth every 4 (four) hours as needed for pain. (Patient not taking: Reported on 9/8/2023), Disp: 5 tablet, Rfl: 0    Family History   Problem Relation Age of Onset    Breast Cancer Paternal Grandmother 40.00        reports that she has never smoked. She has never used smokeless tobacco. She reports current alcohol use. She reports that she does not use drugs.    Review of Systems:  The 12 system review is within normal limits except for as mentioned above.  General ROS: No complaints or constitutional symptoms  Ophthalmic ROS: No complaints of visual changes  Skin: No complaints or symptoms   Endocrine: No complaints or symptoms  Hematologic/Lymphatic: No symptoms or complaints  Psychiatric: No symptoms or complaints  Respiratory ROS: no cough, shortness of breath, or wheezing  Cardiovascular ROS: no chest pain or dyspnea on exertion  Gastrointestinal ROS: As per HPI  Genito-Urinary ROS: no dysuria, trouble voiding, or hematuria  Musculoskeletal ROS: no joint or muscle pain  Neurological ROS: no TIA or stroke symptoms      EXAM:  There were no vitals taken for this visit.  GENERAL: Well developed female, No acute distress, pleasant and conversant   EYES: Pupils equal, round and reactive, no scleral icterus  ABDOMEN: Soft, non-tender, no masses, non-distended  SKIN: Pink, warm and dry, no obvious rashes or lesions   NEURO:No focal deficits, ambulatory  MUSCULOSKELETAL:No obvious deformities, no swelling, normal appearing      LABS:  Lab Results   Component Value Date    WBC 5.9 09/08/2023    HGB 13.8 09/08/2023    HCT 41.3 09/08/2023    MCV 94 09/08/2023     09/08/2023     INR/Prothrombin  confused (e.g., disoriented, slurred speech)   Negative: Passed out (i.e., fainted, collapsed and was not responding)   Negative: Wheezing started suddenly after medicine, an allergic food, or bee sting   Negative: Sounds like a life-threatening emergency to the triager   Negative: MODERATE asthma attack (e.g., SOB at rest, speaks in phrases, audible wheezes) AND doesn't have neb or inhaler available   Negative: Peak flow rate less than 50% of baseline level (RED Zone)   Negative: Hospitalized before with asthma; now feels same   Negative: Oxygen level (e.g., pulse oximetry) 90% or lower     Doesn't own.    Protocols used: Asthma Attack-A-OH     Time  @LABRCNTIP(NA,K,CL,co2,bun,creatinine,labglom,glucose,calcium)@  No results found for: ALT, AST, GGT, ALKPHOS, BILITOT    IMAGES:   Relevant images were reviewed and discussed with the patient.  Notable findings were: CT images reviewed demonstrates hiatal hernia    Assessment/Plan:   Hailey García is a 48 year old female with signs and symptoms consistent with gastroesophageal reflux disease secondary to a hiatal hernia.  I have explained the pathophysiology of GERD in detail as well as the surgical versus non-operative management strategies.      At this point we will proceed with continued initial work-up.  This will include an esophagram.  I will have her follow-up with me in person to go over the results.  Additionally, I discussed the findings of the CT scan on the left lower quadrant of her abdomen which are likely epiploic appendagitis.  Her pain has resolved and her questions have been answered in terms of this.  She had a recent colonoscopy which was unremarkable.    Pk Negron, DO FACS  [phone]  [facility] Department of Surgery

## 2023-10-23 NOTE — TELEPHONE ENCOUNTER
Pt called back, states she is still waiting for call back.     Per chart review, Dr. Conrad is full for today. Scheduled pt for tomorrow with Dr. Dietz. Also advised pt to go to UC or ED if respiratory symptoms worsened before then appt. Pt verbalized understand and agree with plan.    Medina BROWN RN  [facility]

## 2023-10-24 ENCOUNTER — OFFICE VISIT (OUTPATIENT)
Dept: FAMILY MEDICINE | Facility: CLINIC | Age: 58
End: 2023-10-24
Payer: MEDICARE

## 2023-10-24 ENCOUNTER — ANCILLARY PROCEDURE (OUTPATIENT)
Dept: GENERAL RADIOLOGY | Facility: CLINIC | Age: 58
End: 2023-10-24
Attending: FAMILY MEDICINE
Payer: MEDICARE

## 2023-10-24 ENCOUNTER — TELEPHONE (OUTPATIENT)
Dept: FAMILY MEDICINE | Facility: CLINIC | Age: 58
End: 2023-10-24

## 2023-10-24 VITALS
OXYGEN SATURATION: 95 % | SYSTOLIC BLOOD PRESSURE: 109 MMHG | WEIGHT: 240 LBS | DIASTOLIC BLOOD PRESSURE: 74 MMHG | RESPIRATION RATE: 18 BRPM | HEART RATE: 98 BPM | HEIGHT: 67 IN | BODY MASS INDEX: 37.67 KG/M2 | TEMPERATURE: 97.1 F

## 2023-10-24 DIAGNOSIS — Z23 NEED FOR IMMUNIZATION AGAINST INFLUENZA: ICD-10-CM

## 2023-10-24 DIAGNOSIS — R05.3 CHRONIC COUGH: ICD-10-CM

## 2023-10-24 DIAGNOSIS — I10 ESSENTIAL HYPERTENSION: ICD-10-CM

## 2023-10-24 DIAGNOSIS — Z12.11 SCREEN FOR COLON CANCER: Primary | ICD-10-CM

## 2023-10-24 DIAGNOSIS — Z23 NEED FOR COVID-19 VACCINE: ICD-10-CM

## 2023-10-24 DIAGNOSIS — J45.30 MILD PERSISTENT ASTHMA WITHOUT COMPLICATION: ICD-10-CM

## 2023-10-24 DIAGNOSIS — J45.31 MILD PERSISTENT ASTHMA WITH EXACERBATION: ICD-10-CM

## 2023-10-24 DIAGNOSIS — F33.2 SEVERE EPISODE OF RECURRENT MAJOR DEPRESSIVE DISORDER, WITHOUT PSYCHOTIC FEATURES (H): ICD-10-CM

## 2023-10-24 DIAGNOSIS — J45.21 MILD INTERMITTENT ASTHMA WITH EXACERBATION: ICD-10-CM

## 2023-10-24 DIAGNOSIS — F41.1 GAD (GENERALIZED ANXIETY DISORDER): ICD-10-CM

## 2023-10-24 DIAGNOSIS — E11.9 TYPE 2 DIABETES MELLITUS WITHOUT COMPLICATION, WITHOUT LONG-TERM CURRENT USE OF INSULIN (H): ICD-10-CM

## 2023-10-24 LAB — HBA1C MFR BLD: 6.2 % (ref 0–5.6)

## 2023-10-24 PROCEDURE — 36415 COLL VENOUS BLD VENIPUNCTURE: CPT | Performed by: FAMILY MEDICINE

## 2023-10-24 PROCEDURE — 90682 RIV4 VACC RECOMBINANT DNA IM: CPT | Performed by: FAMILY MEDICINE

## 2023-10-24 PROCEDURE — 99214 OFFICE O/P EST MOD 30 MIN: CPT | Mod: 25 | Performed by: FAMILY MEDICINE

## 2023-10-24 PROCEDURE — G0008 ADMIN INFLUENZA VIRUS VAC: HCPCS | Performed by: FAMILY MEDICINE

## 2023-10-24 PROCEDURE — 90480 ADMN SARSCOV2 VAC 1/ONLY CMP: CPT | Performed by: FAMILY MEDICINE

## 2023-10-24 PROCEDURE — 91320 SARSCV2 VAC 30MCG TRS-SUC IM: CPT | Performed by: FAMILY MEDICINE

## 2023-10-24 PROCEDURE — 83036 HEMOGLOBIN GLYCOSYLATED A1C: CPT | Performed by: FAMILY MEDICINE

## 2023-10-24 PROCEDURE — 80061 LIPID PANEL: CPT | Performed by: FAMILY MEDICINE

## 2023-10-24 PROCEDURE — 71046 X-RAY EXAM CHEST 2 VIEWS: CPT | Mod: TC | Performed by: RADIOLOGY

## 2023-10-24 PROCEDURE — 80053 COMPREHEN METABOLIC PANEL: CPT | Performed by: FAMILY MEDICINE

## 2023-10-24 RX ORDER — BUDESONIDE AND FORMOTEROL FUMARATE DIHYDRATE 160; 4.5 UG/1; UG/1
AEROSOL RESPIRATORY (INHALATION)
Qty: 10.2 G | Refills: 1 | Status: SHIPPED | OUTPATIENT
Start: 2023-10-24

## 2023-10-24 RX ORDER — PREDNISONE 20 MG/1
40 TABLET ORAL DAILY
Qty: 10 TABLET | Refills: 0 | Status: SHIPPED | OUTPATIENT
Start: 2023-10-24 | End: 2023-10-29

## 2023-10-24 RX ORDER — BENZONATATE 200 MG/1
200 CAPSULE ORAL 3 TIMES DAILY PRN
Qty: 30 CAPSULE | Refills: 0 | Status: SHIPPED | OUTPATIENT
Start: 2023-10-24

## 2023-10-24 ASSESSMENT — ANXIETY QUESTIONNAIRES
2. NOT BEING ABLE TO STOP OR CONTROL WORRYING: NEARLY EVERY DAY
IF YOU CHECKED OFF ANY PROBLEMS ON THIS QUESTIONNAIRE, HOW DIFFICULT HAVE THESE PROBLEMS MADE IT FOR YOU TO DO YOUR WORK, TAKE CARE OF THINGS AT HOME, OR GET ALONG WITH OTHER PEOPLE: VERY DIFFICULT
4. TROUBLE RELAXING: NEARLY EVERY DAY
GAD7 TOTAL SCORE: 20
6. BECOMING EASILY ANNOYED OR IRRITABLE: NEARLY EVERY DAY
3. WORRYING TOO MUCH ABOUT DIFFERENT THINGS: NEARLY EVERY DAY
5. BEING SO RESTLESS THAT IT IS HARD TO SIT STILL: NEARLY EVERY DAY
7. FEELING AFRAID AS IF SOMETHING AWFUL MIGHT HAPPEN: MORE THAN HALF THE DAYS
1. FEELING NERVOUS, ANXIOUS, OR ON EDGE: NEARLY EVERY DAY
GAD7 TOTAL SCORE: 20

## 2023-10-24 ASSESSMENT — PATIENT HEALTH QUESTIONNAIRE - PHQ9
SUM OF ALL RESPONSES TO PHQ QUESTIONS 1-9: 20
10. IF YOU CHECKED OFF ANY PROBLEMS, HOW DIFFICULT HAVE THESE PROBLEMS MADE IT FOR YOU TO DO YOUR WORK, TAKE CARE OF THINGS AT HOME, OR GET ALONG WITH OTHER PEOPLE: SOMEWHAT DIFFICULT
SUM OF ALL RESPONSES TO PHQ QUESTIONS 1-9: 20

## 2023-10-24 ASSESSMENT — ENCOUNTER SYMPTOMS: COUGH: 1

## 2023-10-24 ASSESSMENT — ASTHMA QUESTIONNAIRES: ACT_TOTALSCORE: 7

## 2023-10-24 NOTE — COMMUNITY RESOURCES LIST (ENGLISH)
10/24/2023   [facility]  N/A  For questions about this resource list or additional care needs, please contact your primary care clinic or care manager.  Phone: [phone]   Email: N/A   Address: [address] [place] [id number]   Hours: N/A        Food and Nutrition       Food pantry  1  [facility] Distance: 0.07 miles      Pickup   [address] [place] [address]  Language: English  Hours: Mon - Tue 9:00 AM - 3:00 PM  Fees: Free, Self Pay   Phone: (579) 163-5278 Email: alysha@[facility].[employer] My [employer] Website: http://[facility] Ill[facility].org/[facility]/     2  [facility] Distance: 0.41 miles      Pickup   [address] [place] [address]  Language: American Sign Language, English, Hmong, Algerian, Kenyan  Hours: Mon - Fri 12:00 PM - 1:00 PM  Fees: Free   Phone: (732) 946-5730 Email: info@[facility].P[employer] My Pet Website: https://www.[facility].org/locations/[facility]?utm_source=[employer]&utm_medium=local&utm_campaign=local%20search     SNAP application assistance  3  [facility] Distance: 0.07 miles      Phone/Virtual   [address] [place] [address]  Language: English  Hours: Mon - Thu 9:00 AM - 4:00 PM , Fri 9:00 AM - 2:00 PM , Sun 10:00 AM - 12:00 PM  Fees: Free   Phone: (990) 889-2494 Email: alysha@[facility]ell[facility] [employer] [employer].Pit My Pet Website: http://[facility] IllFishbowl.org/community/[facility]/     4  [place] [employer] [facility] [employer] (SNAP) Distance: 1.41 miles      Phone/Virtual   [address] [address] [place] [address]  Language: English, Hmong, Lithuanian, Algerian, Kenyan, Bengali  Hours: Mon - Fri 9:00 AM - 5:00 PM  Fees: Free   Phone: (330) 657-2871 Website: https://[place].gov/dhs/people-we-serve/adults/economic-assistance/food-nutrition/programs-and-services/supplemental-nutrition-assistance-program.jsp     Soup kitchen or free  meals  5  [facility] Distance: 0.07 miles      Pickup   [address] [place] [address]  Language: English  Hours: Mon - Fri 11:45 AM - 12:45 PM  Fees: Free   Phone: (685) 274-4555 Email: alysha@[facility].org Website: http://[facility]/Critical access hospital/[facility]/     6  [facility] Distance: 2.14 miles      Pickup   [address] [place] [address]  Language: English  Hours: Wed 5:30 PM - 6:30 PM  Fees: Free   Phone: (159) 440-5475 Email: office@[facility].org Website: https://www.[facility].org          Transportation       Free or low-cost transportation  7  [employer] [facility] Circulator Bus Distance: 5.17 miles      In-Person   [address] [place] [address]  Language: English  Hours: Tue 9:00 AM - 2:00 PM  Fees: Self Pay   Phone: (594) 551-9286 Email: info@[employer] Website: http://www.[employer].org/     8  [facility] Distance: 5.97 miles      In-Person   [address] [place] [address]  Language: English, South Korean  Hours: Mon 9:00 AM - 5:00 PM , Tue 9:30 AM - 7:00 PM , Wed 9:00 AM - 5:00 PM , Thu 9:30 AM - 7:00 PM , Fri 9:00 AM - 5:00 PM  Fees: Free   Phone: (593) 135-6635 Email: erasmo@[employer] Website: http://www.[employer]     Transportation to medical appointments  9  [employer] [facility] [employer] - Non-Emergency Medical Transportation Distance: 2.59 miles      In-Person   [address] [place] [address]  Language: English  Hours: Mon - Fri 8:00 AM - 4:00 PM Appt. Only  Fees: Self Pay   Phone: (750) 688-5849 Website: http://www.[employer].org/Medical-Services/Emergency-medical-services/Non-emergency-transportation/     10  [employer] Distance: 3.19 miles      In-Person   [address] [address]  Language: English  Hours: Mon - Thu 6:00 AM - 6:00 PM , Fri 6:00 AM - 5:00 PM  Fees:  Insurance, Self Pay   Phone: (425) 720-4574 Email: office@[employer].com Website: https://www.[employer].com/          Important Numbers & Websites       Emergency Services   911  City Services   311  Poison Control   (330) 913-6754  Suicide Prevention Lifeline   (631) 265-7976 (TALK)  Child Abuse Hotline   (688) 205-5871 (4-A-Child)  Sexual Assault Hotline   (740) 924-4132 (HOPE)  National Runaway Safeline   (156) 537-3114 (RUNAWAY)  All-Options Talkline   (849) 897-4710  Substance Abuse Referral   (834) 546-1643 (HELP)

## 2023-10-24 NOTE — PROGRESS NOTES
1. Chronic cough  Patient presents with acute/chronic cough - no clear infectious symptoms at this time - more likely exacerbation of underling chronic lung disease - will treat with Benzonatate for cough   - benzonatate (TESSALON) 200 MG capsule; Take 1 capsule (200 mg) by mouth 3 times daily as needed for cough  Dispense: 30 capsule; Refill: 0    2. Mild persistent asthma without complication  Underlying mild persistent asthma - encourage use of controller inhaler - another rx for Symbicort is sent to pharmacy   - budesonide-formoterol (SYMBICORT) 160-4.5 MCG/ACT Inhaler; INHALE 2 PUFFS BY MOUTH 2 TIMES A DAY.  Dispense: 10.2 g; Refill: 1    3. Mild persistent asthma with exacerbation  Patient with mild persistent asthma - with current exacerbation - treat with Prednisone (oral) x 5 days.  CXR done - reviewed personally - no sign of infiltrate.    - predniSONE (DELTASONE) 20 MG tablet; Take 2 tablets (40 mg) by mouth daily for 5 days  Dispense: 10 tablet; Refill: 0  - XR Chest 2 Views; Future    4. Type 2 diabetes mellitus without complication, without long-term current use of insulin (H)  Underlying type II DM - likely will have increasing sugars during course of steroids - warned patient about this -   - Hemoglobin A1c; Future  - Comprehensive metabolic panel (BMP + Alb, Alk Phos, ALT, AST, Total. Bili, TP); Future  - Hemoglobin A1c  - Comprehensive metabolic panel (BMP + Alb, Alk Phos, ALT, AST, Total. Bili, TP)    5. FOX (generalized anxiety disorder)  Patient with FOX/depression symptoms - will refer to mental health for additional intervention   - Adult Mental Health  Referral; Future    6. Severe episode of recurrent major depressive disorder, without psychotic features (H)  As above -   - Adult Mental Health  Referral; Future    7. Essential hypertension  Blood pressure is controlled today - continue current regimen   - Lipid Profile (Chol, Trig, HDL, LDL calc); Future  - Comprehensive  metabolic panel (BMP + Alb, Alk Phos, ALT, AST, Total. Bili, TP); Future  - Lipid Profile (Chol, Trig, HDL, LDL calc)  - Comprehensive metabolic panel (BMP + Alb, Alk Phos, ALT, AST, Total. Bili, TP)    8. Need for COVID-19 vaccine  Due for COVID-19 - discussed - ordered   - COVID-19 12+ (2023-24) (PFIZER)    9. Need for immunization against influenza  Due for seasonal flu vaccine - discussed - ordered   - INFLUENZA VACCINE 18-64Y (FLUBLOK)    10. Screen for colon cancer  Due for colon cancer screening - discussed - agrees to order for colonoscopy referral   - Colonoscopy Screening  Referral; Future      Subjective   Rebecca is a 58 year old, presenting for the following health issues:  Cough (Cough getting worse and have difficult breathing for 3 to 4 months ) and Breathing Problem        10/24/2023     2:27 PM   Additional Questions   Roomed by hser   Accompanied by self       Never had heart problems  Has asthma - barely made it to this room   Since August -or July - has been more short of breath   Hasn't been seen -   Using Albuterol inhaler ;     History of Present Illness       Mental Health Follow-up:  Patient presents to follow-up on Depression & Anxiety.Patient's depression since last visit has been:  Medium  The patient is having other symptoms associated with depression.  Patient's anxiety since last visit has been:  Medium  The patient is having other symptoms associated with anxiety.  Any significant life events: financial concerns and health concerns  Patient is feeling anxious or having panic attacks.  Patient has no concerns about alcohol or drug use.    She eats 2-3 servings of fruits and vegetables daily.She consumes 3 sweetened beverage(s) daily.She exercises with enough effort to increase her heart rate 10 to 19 minutes per day.  She exercises with enough effort to increase her heart rate 7 days per week.   She is taking medications regularly.       Review of Systems   Constitutional:   "Negative for chills and fever.   HENT:  Negative for congestion, ear pain and sore throat.    Eyes:  Negative for pain and visual disturbance.   Respiratory:  Positive for cough and wheezing. Negative for shortness of breath.    Cardiovascular:  Negative for chest pain, palpitations and peripheral edema.   Gastrointestinal:  Negative for abdominal pain, constipation and diarrhea.   Endocrine: Negative for polyuria.   Genitourinary:  Negative for dysuria, frequency and vaginal discharge.   Musculoskeletal:  Negative for arthralgias and myalgias.   Skin:  Negative for rash.   Allergic/Immunologic: Negative.    Neurological:  Negative for dizziness, weakness, headaches and paresthesias.   Hematological:  Does not bruise/bleed easily.   Psychiatric/Behavioral:  Positive for mood changes. The patient is nervous/anxious.    All other systems reviewed and are negative.           Objective    /74 (BP Location: Left arm, Patient Position: Sitting, Cuff Size: Adult Large)   Pulse 98   Temp 97.1  F (36.2  C) (Temporal)   Resp 18   Ht 1.702 m (5' 7\")   Wt 108.9 kg (240 lb)   LMP  (LMP Unknown)   SpO2 95%   BMI 37.59 kg/m    Body mass index is 37.59 kg/m .  Physical Exam  Vitals reviewed.   Constitutional:       General: She is not in acute distress.     Appearance: Normal appearance.   HENT:      Head: Normocephalic.      Right Ear: Tympanic membrane, ear canal and external ear normal.      Left Ear: Tympanic membrane, ear canal and external ear normal.      Nose: Nose normal.      Mouth/Throat:      Mouth: Mucous membranes are moist.      Pharynx: No posterior oropharyngeal erythema.   Eyes:      Extraocular Movements: Extraocular movements intact.      Conjunctiva/sclera: Conjunctivae normal.      Pupils: Pupils are equal, round, and reactive to light.   Cardiovascular:      Rate and Rhythm: Normal rate and regular rhythm.      Pulses: Normal pulses.      Heart sounds: Normal heart sounds. No murmur " heard.  Pulmonary:      Effort: Pulmonary effort is normal.      Breath sounds: Wheezing present.      Comments: Occ cough    Abdominal:      Palpations: Abdomen is soft. There is no mass.      Tenderness: There is no abdominal tenderness. There is no guarding or rebound.   Musculoskeletal:         General: No deformity. Normal range of motion.      Cervical back: Normal range of motion and neck supple.   Lymphadenopathy:      Cervical: No cervical adenopathy.   Skin:     General: Skin is warm and dry.   Neurological:      General: No focal deficit present.      Mental Status: She is alert and oriented to person, place, and time.   Psychiatric:         Mood and Affect: Mood normal.         Behavior: Behavior normal.            Results for orders placed or performed in visit on 10/24/23   XR Chest 2 Views     Status: None    Narrative    EXAM: XR CHEST 2 VIEWS  LOCATION: [facility]  DATE: 10/24/2023    INDICATION:  Mild intermittent asthma with exacerbation  COMPARISON: None.      Impression    IMPRESSION: Negative chest.   Results for orders placed or performed in visit on 10/24/23   Hemoglobin A1c     Status: Abnormal   Result Value Ref Range    Hemoglobin A1C 6.2 (H) 0.0 - 5.6 %   Lipid Profile (Chol, Trig, HDL, LDL calc)     Status: Abnormal   Result Value Ref Range    Cholesterol 131 <200 mg/dL    Triglycerides 96 <150 mg/dL    Direct Measure HDL 40 (L) >=50 mg/dL    LDL Cholesterol Calculated 72 <=100 mg/dL    Non HDL Cholesterol 91 <130 mg/dL    Narrative    Cholesterol  Desirable:  <200 mg/dL    Triglycerides  Normal:  Less than 150 mg/dL  Borderline High:  150-199 mg/dL  High:  200-499 mg/dL  Very High:  Greater than or equal to 500 mg/dL    Direct Measure HDL  Female:  Greater than or equal to 50 mg/dL   Male:  Greater than or equal to 40 mg/dL    LDL Cholesterol  Desirable:  <100mg/dL  Above Desirable:  100-129 mg/dL   Borderline High:  130-159 mg/dL   High:  160-189 mg/dL   Very  High:  >= 190 mg/dL    Non HDL Cholesterol  Desirable:  130 mg/dL  Above Desirable:  130-159 mg/dL  Borderline High:  160-189 mg/dL  High:  190-219 mg/dL  Very High:  Greater than or equal to 220 mg/dL   Comprehensive metabolic panel (BMP + Alb, Alk Phos, ALT, AST, Total. Bili, TP)     Status: Abnormal   Result Value Ref Range    Sodium 140 135 - 145 mmol/L    Potassium 3.9 3.4 - 5.3 mmol/L    Carbon Dioxide (CO2) 23 22 - 29 mmol/L    Anion Gap 12 7 - 15 mmol/L    Urea Nitrogen 7.7 (L) 8.0 - 23.0 mg/dL    Creatinine 0.72 0.51 - 0.95 mg/dL    GFR Estimate >90 >60 mL/min/1.73m2    Calcium 9.3 8.8 - 10.2 mg/dL    Chloride 105 98 - 107 mmol/L    Glucose 142 (H) 70 - 99 mg/dL    Alkaline Phosphatase 106 (H) 35 - 104 U/L    AST 27 0 - 45 U/L    ALT 22 0 - 50 U/L    Protein Total 7.2 6.4 - 8.3 g/dL    Albumin 3.9 3.5 - 5.2 g/dL    Bilirubin Total 1.2 <=1.2 mg/dL               Prior to immunization administration, verified patients identity using patient s name and date of birth. Please see Immunization Activity for additional information.     Screening Questionnaire for Adult Immunization    Are you sick today?   Yes   Do you have allergies to medications, food, a vaccine component or latex?   Yes   Have you ever had a serious reaction after receiving a vaccination?   No   Do you have a long-term health problem with heart, lung, kidney, or metabolic disease (e.g., diabetes), asthma, a blood disorder, no spleen, complement component deficiency, a cochlear implant, or a spinal fluid leak?  Are you on long-term aspirin therapy?   Yes   Do you have cancer, leukemia, HIV/AIDS, or any other immune system problem?   No   Do you have a parent, brother, or sister with an immune system problem?   No   In the past 3 months, have you taken medications that affect  your immune system, such as prednisone, other steroids, or anticancer drugs; drugs for the treatment of rheumatoid arthritis, Crohn s disease, or psoriasis; or have you had  radiation treatments?   No   Have you had a seizure, or a brain or other nervous system problem?   No   During the past year, have you received a transfusion of blood or blood    products, or been given immune (gamma) globulin or antiviral drug?   No   For women: Are you pregnant or is there a chance you could become       pregnant during the next month?   No   Have you received any vaccinations in the past 4 weeks?   No     Immunization questionnaire was positive for at least one answer.  Notified provider.      Patient instructed to remain in clinic for 15 minutes afterwards, and to report any adverse reactions.     Screening performed by Bora Villalba MA on 10/24/2023 at 2:30 PM.

## 2023-10-24 NOTE — COMMUNITY RESOURCES LIST (ENGLISH)
10/24/2023   [facility]  N/A  For questions about this resource list or additional care needs, please contact your primary care clinic or care manager.  Phone: [phone]   Email: N/A   Address: [address] [place] [address]   Hours: N/A        Food and Nutrition       Food pantry  1  [facility] Distance: 0.07 miles      Pickup   [address] [place] [address]  Language: English  Hours: Mon - Tue 9:00 AM - 3:00 PM  Fees: Free, Self Pay   Phone: (803) 277-8552 Email: alysha@[facility] CenterHerrenschmiede.[employer] Website: http://Infindo Technology Sdn BhdBayhealth Hospital, Sussex CampusRedis Labs.org/[facility]/     2  [facility] Distance: 0.41 miles      Pickup   [address] [place] [address]  Language: American Sign Language, English, Hmong, Namibian, Belarusian  Hours: Mon - Fri 12:00 PM - 1:00 PM  Fees: Free   Phone: (231) 407-7669 Email: info@[facility].[employer] Website: https://www.[facility].org/locations/[facility]?utm_source=Mindframe&utm_medium=local&utm_campaign=local%20search     SNAP application assistance  3  [facility] Distance: 0.07 miles      Phone/Virtual   [address] [place] [address]  Language: English  Hours: Mon - Thu 9:00 AM - 4:00 PM , Fri 9:00 AM - 2:00 PM , Sun 10:00 AM - 12:00 PM  Fees: Free   Phone: (422) 397-5433 Email: alysha@[facility] LawtonWAM Enterprises LLCG[facility] Regional Medical CenterHerrenschmiede.[employer] Website: http://Infind[facility] Technology Sdn BhdBay[facility] Hospital[facility] CampusRedis Labs.org/community/[facility]/     4  [place] Department of Human Services - MNFoodLisa (SNAP) Distance: 1.41 miles      Phone/Virtual   [address] [address] [place] [address]  Language: English, Hmong, Serbian, Namibian, Belarusian, Slovak  Hours: Mon - Fri 9:00 AM - 5:00 PM  Fees: Free   Phone: (348) 200-6948 Website: https://[place].gov/dhs/people-we-serve/adults/economic-assistance/food-nutrition/programs-and-services/supplemental-nutrition-assistance-program.jsp     Soup kitchen or free  meals  5  [facility] [place] Distance: 0.07 miles      Pickup   [address] [place] [address]  Language: English  Hours: Mon - Fri 11:45 AM - 12:45 PM  Fees: Free   Phone: (420) 875-2942 Email: alysha@[facility].org Website: http://[facility]/     6  [facility] Distance: 2.14 miles      Pickup   [address] [place] [address]  Language: English  Hours: Wed 5:30 PM - 6:30 PM  Fees: Free   Phone: (131) 546-7095 Email: office@[facility].org Website: https://www.[facility].org          Transportation       Free or low-cost transportation  7  [employer] [facility] Circulator Bus Distance: 5.17 miles      In-Person   [address] [place] [address]  Language: English  Hours: Tue 9:00 AM - 2:00 PM  Fees: Self Pay   Phone: (971) 999-4056 Email: info@[employer] Website: http://www.[employer].org/     8  [facility] Distance: 5.97 miles      In-Person   [address] [place] [address]  Language: English, Bruneian  Hours: Mon 9:00 AM - 5:00 PM , Tue 9:30 AM - 7:00 PM , Wed 9:00 AM - 5:00 PM , Thu 9:30 AM - 7:00 PM , Fri 9:00 AM - 5:00 PM  Fees: Free   Phone: (639) 847-1383 Email: erasmo@[employer] Website: http://www.[employer]ob     Transportation to medical appointments  9  [employer] Medical Transportation - Non-Emergency Medical Transportation Distance: 2.59 miles      In-Person   [address] [place] [address]  Language: English  Hours: Mon - Fri 8:00 AM - 4:00 PM Appt. Only  Fees: Self Pay   Phone: (109) 325-9214 Website: http://www.[employer].org/Medical-Services/Emergency-medical-services/Non-emergency-transportation/     10  Disc[employer] Ride Distance: 3.19 miles      In-Person   [address] [address]  Language: English  Hours: Mon - Thu 6:00 AM - 6:00 PM , Fri 6:00 AM - 5:00 PM  Fees:  Insurance, Self Pay   Phone: (834) 511-6897 Email: office@[employer] Website: https://www.[employer]/          Important Numbers & Websites       Emergency Services   911  City Services   311  Poison Control   (726) 957-4737  Suicide Prevention Lifeline   (258) 942-4206 (TALK)  Child Abuse Hotline   (676) 805-2214 (4-A-Child)  Sexual Assault Hotline   (609) 825-2127 (HOPE)  National Runaway Safeline   (731) 434-6205 (RUNAWAY)  All-Options Talkline   (752) 877-3049  Substance Abuse Referral   (108) 662-1773 (HELP)

## 2023-10-24 NOTE — TELEPHONE ENCOUNTER
PRIOR AUTHORIZATION DENIED    Medication: BENZONATATE 200 MG PO CAPS  Insurance Company: [employer] - Phone [phone] Fax [phone]  Denial Date: 10/24/2023  Denial Rational: Drug is excluded from Medicare Part D coverage   Appeal Information: N/A  Patient Notified: NO

## 2023-10-24 NOTE — COMMUNITY RESOURCES LIST (ENGLISH)
10/24/2023   [facility]  N/A  For questions about this resource list or additional care needs, please contact your primary care clinic or care manager.  Phone: [phone]   Email: N/A   Address: [address] [place] [address]   Hours: N/A        Food and Nutrition       Food pantry  1  [facility] Distance: 0.07 miles      Pickup   [address] [place] [address]  Language: English  Hours: Mon - Tue 9:00 AM - 3:00 PM  Fees: Free, Self Pay   Phone: (405) 471-7793 Email: alysha@[facility] CenterGPal.[employer] Website: http://AffaredelgiornoBayhealth Medical CenterLooxcie.org/[facility]/     2  [facility] Distance: 0.41 miles      Pickup   [address] [place] [address]  Language: American Sign Language, English, Hmong, Singaporean, Tuvaluan  Hours: Mon - Fri 12:00 PM - 1:00 PM  Fees: Free   Phone: (331) 143-9909 Email: info@[facility].[employer] Website: https://www.[facility].org/locations/[facility]?utm_source=[employer]&utm_medium=local&utm_campaign=local%20search     SNAP application assistance  3  [facility] Distance: 0.07 miles      Phone/Virtual   [address] [place] [address]  Language: English  Hours: Mon - Thu 9:00 AM - 4:00 PM , Fri 9:00 AM - 2:00 PM , Sun 10:00 AM - 12:00 PM  Fees: Free   Phone: (204) 819-5794 Email: alysha@[facility] WeatherfordActivePathThe Hospital at Westlake Medical CenterGPal.[employer] Website: http://AffaredelgiornoBayhealth Medical CenterLooxcie.org/community/[facility]/     4  [place] Department of Human Services - MNFoodLisa (SNAP) Distance: 1.41 miles      Phone/Virtual   [address] [address] [place] [address]  Language: English, Hmong, Czech, Singaporean, Tuvaluan, Arabic  Hours: Mon - Fri 9:00 AM - 5:00 PM  Fees: Free   Phone: (121) 902-9020 Website: https://[place].gov/dhs/people-we-serve/adults/economic-assistance/food-nutrition/programs-and-services/supplemental-nutrition-assistance-program.jsp     Soup kitchen or free  meals  5  [facility] [place] Distance: 0.07 miles      Pickup   [address] [place] [address]  Language: English  Hours: Mon - Fri 11:45 AM - 12:45 PM  Fees: Free   Phone: (132) 878-4281 Email: alysha@[facility].org Website: http://[facility]/     6  [facility] - [place] Distance: 2.14 miles      Pickup   [address] [place] [address]  Language: English  Hours: Wed 5:30 PM - 6:30 PM  Fees: Free   Phone: (373) 455-8473 Email: office@[facility].org Website: https://www.[facility].org          Transportation       Free or low-cost transportation  7  [facility] [facility] Circulator Bus Distance: 5.17 miles      In-Person   [address] [place] [address]  Language: English  Hours: Tue 9:00 AM - 2:00 PM  Fees: Self Pay   Phone: (817) 713-3298 Email: info@[employer] Website: http://www."[employer].org/     8  [facility] Distance: 5.97 miles      In-Person   [address] [place] [address]  Language: English, Bermudian  Hours: Mon 9:00 AM - 5:00 PM , Tue 9:30 AM - 7:00 PM , Wed 9:00 AM - 5:00 PM , Thu 9:30 AM - 7:00 PM , Fri 9:00 AM - 5:00 PM  Fees: Free   Phone: (401) 234-8183 Email: erasmo@[employer] Website: http://www.[employer]     Transportation to medical appointments  9  [employer] Medical Transportation - Non-Emergency Medical Transportation Distance: 2.59 miles      In-Person   [address] [place] [address]  Language: English  Hours: Mon - Fri 8:00 AM - 4:00 PM Appt. Only  Fees: Self Pay   Phone: (716) 225-7164 Website: http://www.[employer].org/Medical-Services/Emergency-medical-services/Non-emergency-transportation/     10  Discover Ride Distance: 3.19 miles      In-Person   [address] [address]  Language: English  Hours: Mon - Thu 6:00 AM - 6:00 PM , Fri 6:00 AM - 5:00 PM  Fees:  Insurance, Self Pay   Phone: (231) 335-6615 Email: office@[employer] Website: https://www.[employer]/          Important Numbers & Websites       Emergency Services   911  City Services   311  Poison Control   (300) 631-6134  Suicide Prevention Lifeline   (348) 251-1766 (TALK)  Child Abuse Hotline   (495) 620-6580 (4-A-Child)  Sexual Assault Hotline   (887) 238-1669 (HOPE)  National Runaway Safeline   (239) 326-1998 (RUNAWAY)  All-Options Talkline   (938) 374-9794  Substance Abuse Referral   (943) 232-7554 (HELP)

## 2023-10-25 LAB
ALBUMIN SERPL BCG-MCNC: 3.9 G/DL (ref 3.5–5.2)
ALP SERPL-CCNC: 106 U/L (ref 35–104)
ALT SERPL W P-5'-P-CCNC: 22 U/L (ref 0–50)
ANION GAP SERPL CALCULATED.3IONS-SCNC: 12 MMOL/L (ref 7–15)
AST SERPL W P-5'-P-CCNC: 27 U/L (ref 0–45)
BILIRUB SERPL-MCNC: 1.2 MG/DL
BUN SERPL-MCNC: 7.7 MG/DL (ref 8–23)
CALCIUM SERPL-MCNC: 9.3 MG/DL (ref 8.8–10.2)
CHLORIDE SERPL-SCNC: 105 MMOL/L (ref 98–107)
CHOLEST SERPL-MCNC: 131 MG/DL
CREAT SERPL-MCNC: 0.72 MG/DL (ref 0.51–0.95)
DEPRECATED HCO3 PLAS-SCNC: 23 MMOL/L (ref 22–29)
EGFRCR SERPLBLD CKD-EPI 2021: >90 ML/MIN/1.73M2
GLUCOSE SERPL-MCNC: 142 MG/DL (ref 70–99)
HDLC SERPL-MCNC: 40 MG/DL
LDLC SERPL CALC-MCNC: 72 MG/DL
NONHDLC SERPL-MCNC: 91 MG/DL
POTASSIUM SERPL-SCNC: 3.9 MMOL/L (ref 3.4–5.3)
PROT SERPL-MCNC: 7.2 G/DL (ref 6.4–8.3)
SODIUM SERPL-SCNC: 140 MMOL/L (ref 135–145)
TRIGL SERPL-MCNC: 96 MG/DL

## 2023-10-29 ASSESSMENT — ENCOUNTER SYMPTOMS
ABDOMINAL PAIN: 0
MYALGIAS: 0
EYE PAIN: 0
DIARRHEA: 0
DIZZINESS: 0
WEAKNESS: 0
ALLERGIC/IMMUNOLOGIC NEGATIVE: 1
PARESTHESIAS: 0
SORE THROAT: 0
DYSURIA: 0
FEVER: 0
FREQUENCY: 0
CHILLS: 0
WHEEZING: 1
SHORTNESS OF BREATH: 0
BRUISES/BLEEDS EASILY: 0
HEADACHES: 0
CONSTIPATION: 0
NERVOUS/ANXIOUS: 1
PALPITATIONS: 0
ARTHRALGIAS: 0

## 2023-10-30 NOTE — CONFIDENTIAL NOTE
Interpersonal Safety (Abuse) Screening Follow Up    Interpersonal Safety Screen  Do you feel physically and emotionally safe where you currently live?: No  Within the past 12 months, have you been hit, slapped, kicked or otherwise physically hurt by someone?: No  Within the past 12 months, have you been humiliated or emotionally abused in other ways by your partner or ex-partner?: No         No data to display              Summary of concern: no current active concern per patient -     Follow Up  Work with Care Coordination/BHC/TC to implement plan of care

## 2023-12-24 ENCOUNTER — HEALTH MAINTENANCE LETTER (OUTPATIENT)
Age: 58
End: 2023-12-24

## 2023-12-28 DIAGNOSIS — I10 ESSENTIAL HYPERTENSION: ICD-10-CM

## 2023-12-29 RX ORDER — AMLODIPINE BESYLATE 5 MG/1
5 TABLET ORAL DAILY
Qty: 90 TABLET | Refills: 1 | Status: SHIPPED | OUTPATIENT
Start: 2023-12-29 | End: 2024-07-26

## 2024-05-12 ENCOUNTER — HEALTH MAINTENANCE LETTER (OUTPATIENT)
Age: 59
End: 2024-05-12

## 2024-07-02 ENCOUNTER — TELEPHONE (OUTPATIENT)
Dept: FAMILY MEDICINE | Facility: CLINIC | Age: 59
End: 2024-07-02
Payer: MEDICARE

## 2024-07-02 NOTE — TELEPHONE ENCOUNTER
Patient Quality Outreach    Patient is due for the following:   Breast Cancer Screening - Mammogram    Next Steps:   Schedule a Adult Preventative    Type of outreach:    Sent [employer] message.  Closing encounter as I will not be available for follow up until 7/25/24.        Questions for provider review:    None           Andrea Behrend

## 2024-07-26 DIAGNOSIS — I10 ESSENTIAL HYPERTENSION: ICD-10-CM

## 2024-07-26 RX ORDER — AMLODIPINE BESYLATE 5 MG/1
5 TABLET ORAL DAILY
Qty: 90 TABLET | Refills: 0 | Status: SHIPPED | OUTPATIENT
Start: 2024-07-26

## 2024-08-30 DIAGNOSIS — L30.9 ECZEMA, UNSPECIFIED TYPE: ICD-10-CM

## 2024-08-30 RX ORDER — TRIAMCINOLONE ACETONIDE 1 MG/G
CREAM TOPICAL
Qty: 80 G | Refills: 0 | Status: SHIPPED | OUTPATIENT
Start: 2024-08-30

## 2024-10-08 ENCOUNTER — TELEPHONE (OUTPATIENT)
Dept: FAMILY MEDICINE | Facility: CLINIC | Age: 59
End: 2024-10-08
Payer: MEDICARE

## 2024-10-08 NOTE — TELEPHONE ENCOUNTER
Patient Quality Outreach    Patient is due for the following:   Breast Cancer Screening - Mammogram    Next Steps:   Schedule a Adult Preventative    Type of outreach:    Sent [employer] message.      Questions for provider review:    None           Andrea Behrend

## 2024-10-22 NOTE — TELEPHONE ENCOUNTER
Patient Quality Outreach    Patient is due for the following:   Breast Cancer Screening - Mammogram    Next Steps:   Schedule a Adult Preventative    Type of outreach:    Phone, left message for patient/parent to call back.    Next Steps:  Reach out within 90 days via [employer].    Max number of attempts reached: Yes. Will try again in 90 days if patient still on fail list.    Questions for provider review:    None           Andrea Behrend

## 2024-10-26 DIAGNOSIS — I10 ESSENTIAL HYPERTENSION: ICD-10-CM

## 2024-10-28 RX ORDER — AMLODIPINE BESYLATE 5 MG/1
5 TABLET ORAL DAILY
Qty: 90 TABLET | Refills: 0 | Status: SHIPPED | OUTPATIENT
Start: 2024-10-28

## 2024-10-29 NOTE — TELEPHONE ENCOUNTER
Per patient she is not with [facility] anymore. Patient declined to schedule an appointment. Completing task.

## 2024-12-08 ENCOUNTER — HEALTH MAINTENANCE LETTER (OUTPATIENT)
Age: 59
End: 2024-12-08

## 2025-01-18 ENCOUNTER — HEALTH MAINTENANCE LETTER (OUTPATIENT)
Age: 60
End: 2025-01-18

## 2025-04-27 ENCOUNTER — HEALTH MAINTENANCE LETTER (OUTPATIENT)
Age: 60
End: 2025-04-27

## 2025-06-29 ENCOUNTER — HEALTH MAINTENANCE LETTER (OUTPATIENT)
Age: 60
End: 2025-06-29